# Patient Record
Sex: MALE | Race: WHITE | NOT HISPANIC OR LATINO | Employment: OTHER | URBAN - NONMETROPOLITAN AREA
[De-identification: names, ages, dates, MRNs, and addresses within clinical notes are randomized per-mention and may not be internally consistent; named-entity substitution may affect disease eponyms.]

---

## 2017-03-28 ENCOUNTER — OFFICE VISIT (OUTPATIENT)
Dept: GASTROENTEROLOGY | Facility: CLINIC | Age: 82
End: 2017-03-28

## 2017-03-28 VITALS
HEIGHT: 68 IN | HEART RATE: 128 BPM | SYSTOLIC BLOOD PRESSURE: 90 MMHG | BODY MASS INDEX: 24.55 KG/M2 | DIASTOLIC BLOOD PRESSURE: 60 MMHG | WEIGHT: 162 LBS

## 2017-03-28 DIAGNOSIS — R18.8 CIRRHOSIS OF LIVER WITH ASCITES, UNSPECIFIED HEPATIC CIRRHOSIS TYPE (HCC): Primary | ICD-10-CM

## 2017-03-28 DIAGNOSIS — K74.60 CIRRHOSIS OF LIVER WITH ASCITES, UNSPECIFIED HEPATIC CIRRHOSIS TYPE (HCC): Primary | ICD-10-CM

## 2017-03-28 PROCEDURE — 99203 OFFICE O/P NEW LOW 30 MIN: CPT | Performed by: INTERNAL MEDICINE

## 2017-03-28 RX ORDER — FLUTICASONE PROPIONATE 50 MCG
2 SPRAY, SUSPENSION (ML) NASAL DAILY
COMMUNITY

## 2017-03-28 RX ORDER — DILTIAZEM HYDROCHLORIDE 120 MG/1
120 CAPSULE, COATED, EXTENDED RELEASE ORAL
COMMUNITY
End: 2017-03-28

## 2017-03-28 RX ORDER — FUROSEMIDE 40 MG/1
40 TABLET ORAL EVERY MORNING
COMMUNITY

## 2017-03-28 RX ORDER — SPIRONOLACTONE 25 MG/1
25 TABLET ORAL DAILY
COMMUNITY
End: 2018-01-01 | Stop reason: SDUPTHER

## 2017-03-28 RX ORDER — DIAZEPAM 5 MG/1
5 TABLET ORAL 2 TIMES DAILY
COMMUNITY
Start: 2016-05-25 | End: 2018-01-01

## 2017-03-28 NOTE — PROGRESS NOTES
[facility]      Chief Complaint:   Chief Complaint   Patient presents with   • Nonalcoholic Steatohepatitis       Subjective     HPI:   Patient with questionable history of cirrhosis of the liver.  Patient unsure if any reason for having hepatitis or cirrhosis.  Patient denies a large alcohol history.  Patient has a history of having paracentesis for ascites and was placed on a low-dose diuretic and has had marked improvement in the ascites but is having problems with tachycardia and hypotension.  Patient states he does not have a primary doctor that he follows up with our cardiologist.    Plan; we'll do workup for patient's cirrhosis.  We'll try to determine patient's positive cirrhosis also do CT scan of the abdomen and pelvis.  If no causes found patient may need liver biopsy.  We'll also send patient to urgent care for evaluation of tachycardia and hypotension patient may need to be seen in the emergency room.  At this time patient feels well but I am worried about patient's markedly elevated heart rate.    Past Medical History:   Past Medical History:   Diagnosis Date   • GERD (gastroesophageal reflux disease)    • History of echocardiogram 05/04/2015    Echocardiogram W/ color flow 17916 (Low normal LV funciton with Ef of 50-55%. Normal RV size and funciton. Grade 1A diastolic dysfunction. No indication of left ventricular or left atrial thrombus. No sig. valvular regurg or stenosis.)   • Hyperlipidemia    • Hypertension        Family History:  History reviewed. No pertinent family history.    Social History:   reports that he has never smoked. His smokeless tobacco use includes Chew. He reports that he drinks alcohol. He reports that he does not use illicit drugs.    Medications:   Current Outpatient Prescriptions   Medication Sig Dispense Refill   • aspirin 325 MG tablet Take 325 mg by mouth Daily.     • atorvastatin (LIPITOR) 40 MG tablet Take 20 mg by mouth Daily With Dinner.     •  "diazePAM (VALIUM) 5 MG tablet Take 5 mg by mouth 2 (Two) Times a Day.     • fluticasone (FLONASE) 50 MCG/ACT nasal spray 2 sprays into each nostril Daily.     • furosemide (LASIX) 40 MG tablet Take 40 mg by mouth Daily. In A.M.     • HYDROcodone-acetaminophen (NORCO)  MG per tablet Take 1 tablet by mouth Every 4 (Four) Hours As Needed for moderate pain (4-6).     • lisinopril (PRINIVIL,ZESTRIL) 5 MG tablet Take 2.5 mg by mouth Daily.     • omeprazole (priLOSEC) 20 MG capsule Take 20 mg by mouth Every Morning Before Breakfast.     • risperiDONE (risperDAL) 3 MG tablet Take 3 mg by mouth Every Night.     • spironolactone (ALDACTONE) 25 MG tablet Take 25 mg by mouth Daily.       No current facility-administered medications for this visit.        Allergies:  Codeine; Iodine; and Penicillins    ROS:    Review of Systems   Constitutional: Negative for activity change, appetite change, chills, diaphoresis, fatigue, fever and unexpected weight change.   HENT: Negative for sore throat and trouble swallowing.    Respiratory: Negative for shortness of breath.    Gastrointestinal: Negative for abdominal distention, abdominal pain, anal bleeding, blood in stool, constipation, diarrhea, nausea, rectal pain and vomiting.   Musculoskeletal: Negative for arthralgias.   Skin: Negative for pallor.   Neurological: Negative for light-headedness.     Objective     Blood pressure 90/60, pulse (!) 128, height 68\" (172.7 cm), weight 162 lb (73.5 kg).    Physical Exam   Constitutional: He is oriented to person, place, and time. He appears well-developed and well-nourished. No distress.   HENT:   Head: Normocephalic and atraumatic.   Cardiovascular: Normal rate, regular rhythm, normal heart sounds and intact distal pulses.  Exam reveals no gallop and no friction rub.    No murmur heard.  Pulmonary/Chest: Breath sounds normal. No respiratory distress. He has no wheezes. He has no rales. He exhibits no tenderness.   Abdominal: Soft. Bowel " sounds are normal. He exhibits no distension and no mass. There is no tenderness. There is no rebound and no guarding. No hernia.   Musculoskeletal: Normal range of motion. He exhibits no edema.   Neurological: He is alert and oriented to person, place, and time.   Skin: Skin is warm and dry. No rash noted. He is not diaphoretic. No erythema. No pallor.   Psychiatric: He has a normal mood and affect. His behavior is normal. Judgment and thought content normal.        Assessment/Plan   Rodriguez was seen today for nonalcoholic steatohepatitis.    Diagnoses and all orders for this visit:    Cirrhosis of liver with ascites, unspecified hepatic cirrhosis type  -     Alpha - 1 - Antitrypsin  -     Anti-Smooth Muscle Antibody Titer  -     TORIBIO  -     Ceruloplasmin  -     Iron Profile  -     Hepatitis B & C Profile  -     Mitochondrial Antibodies, M2  -     SPICER Fibrosure  -     CT Abdomen Pelvis With & Without Contrast; Future        * Surgery not found *     Diagnosis Plan   1. Cirrhosis of liver with ascites, unspecified hepatic cirrhosis type  Alpha - 1 - Antitrypsin    Anti-Smooth Muscle Antibody Titer    TORIBIO    Ceruloplasmin    Iron Profile    Hepatitis B & C Profile    Mitochondrial Antibodies, M2    SPICER Fibrosure    CT Abdomen Pelvis With & Without Contrast       Anticipated Surgical Procedure:  No orders of the defined types were placed in this encounter.      The risks, benefits, and alternatives of this procedure have been discussed with the patient or the responsible party- the patient understands and agrees to proceed.

## 2017-04-19 DIAGNOSIS — K74.60 CIRRHOSIS OF LIVER WITH ASCITES, UNSPECIFIED HEPATIC CIRRHOSIS TYPE (HCC): Primary | ICD-10-CM

## 2017-04-19 DIAGNOSIS — R18.8 CIRRHOSIS OF LIVER WITH ASCITES, UNSPECIFIED HEPATIC CIRRHOSIS TYPE (HCC): Primary | ICD-10-CM

## 2017-04-20 ENCOUNTER — HOSPITAL ENCOUNTER (OUTPATIENT)
Dept: CT IMAGING | Facility: HOSPITAL | Age: 82
End: 2017-04-20
Attending: INTERNAL MEDICINE

## 2017-04-20 ENCOUNTER — HOSPITAL ENCOUNTER (OUTPATIENT)
Dept: CT IMAGING | Facility: HOSPITAL | Age: 82
Discharge: HOME | End: 2017-04-20
Admitting: NURSE PRACTITIONER

## 2017-04-20 PROCEDURE — 74176 CT ABD & PELVIS W/O CONTRAST: CPT

## 2017-04-21 RX ORDER — DILTIAZEM HYDROCHLORIDE 120 MG/1
120 CAPSULE, COATED, EXTENDED RELEASE ORAL DAILY
COMMUNITY

## 2017-04-27 ENCOUNTER — APPOINTMENT (OUTPATIENT)
Dept: CT IMAGING | Facility: HOSPITAL | Age: 82
End: 2017-04-27
Attending: INTERNAL MEDICINE

## 2017-05-04 ENCOUNTER — TELEPHONE (OUTPATIENT)
Dept: GASTROENTEROLOGY | Facility: CLINIC | Age: 82
End: 2017-05-04

## 2017-05-05 LAB
IRON 24H UR-MRATE: 160 MCG/DL (ref 49–181)
IRON SATN MFR SERPL: 71 % (ref 20–55)
TIBC SERPL-MCNC: 226 MCG/DL (ref 261–462)

## 2017-05-05 PROCEDURE — 83883 ASSAY NEPHELOMETRY NOT SPEC: CPT | Performed by: INTERNAL MEDICINE

## 2017-05-05 PROCEDURE — 84450 TRANSFERASE (AST) (SGOT): CPT | Performed by: INTERNAL MEDICINE

## 2017-05-05 PROCEDURE — 82947 ASSAY GLUCOSE BLOOD QUANT: CPT | Performed by: INTERNAL MEDICINE

## 2017-05-05 PROCEDURE — 87340 HEPATITIS B SURFACE AG IA: CPT | Performed by: INTERNAL MEDICINE

## 2017-05-05 PROCEDURE — 82103 ALPHA-1-ANTITRYPSIN TOTAL: CPT | Performed by: INTERNAL MEDICINE

## 2017-05-05 PROCEDURE — 82247 BILIRUBIN TOTAL: CPT | Performed by: INTERNAL MEDICINE

## 2017-05-05 PROCEDURE — 82977 ASSAY OF GGT: CPT | Performed by: INTERNAL MEDICINE

## 2017-05-05 PROCEDURE — 84478 ASSAY OF TRIGLYCERIDES: CPT | Performed by: INTERNAL MEDICINE

## 2017-05-05 PROCEDURE — 83550 IRON BINDING TEST: CPT | Performed by: INTERNAL MEDICINE

## 2017-05-05 PROCEDURE — 83516 IMMUNOASSAY NONANTIBODY: CPT | Performed by: INTERNAL MEDICINE

## 2017-05-05 PROCEDURE — 82465 ASSAY BLD/SERUM CHOLESTEROL: CPT | Performed by: INTERNAL MEDICINE

## 2017-05-05 PROCEDURE — 86704 HEP B CORE ANTIBODY TOTAL: CPT | Performed by: INTERNAL MEDICINE

## 2017-05-05 PROCEDURE — 83540 ASSAY OF IRON: CPT | Performed by: INTERNAL MEDICINE

## 2017-05-05 PROCEDURE — 86705 HEP B CORE ANTIBODY IGM: CPT | Performed by: INTERNAL MEDICINE

## 2017-05-05 PROCEDURE — 84460 ALANINE AMINO (ALT) (SGPT): CPT | Performed by: INTERNAL MEDICINE

## 2017-05-05 PROCEDURE — 86038 ANTINUCLEAR ANTIBODIES: CPT | Performed by: INTERNAL MEDICINE

## 2017-05-05 PROCEDURE — 83010 ASSAY OF HAPTOGLOBIN QUANT: CPT | Performed by: INTERNAL MEDICINE

## 2017-05-05 PROCEDURE — 86803 HEPATITIS C AB TEST: CPT | Performed by: INTERNAL MEDICINE

## 2017-05-05 PROCEDURE — 86706 HEP B SURFACE ANTIBODY: CPT | Performed by: INTERNAL MEDICINE

## 2017-05-05 PROCEDURE — 82390 ASSAY OF CERULOPLASMIN: CPT | Performed by: INTERNAL MEDICINE

## 2017-05-05 PROCEDURE — 87350 HEPATITIS BE AG IA: CPT | Performed by: INTERNAL MEDICINE

## 2017-05-05 PROCEDURE — 86707 HEPATITIS BE ANTIBODY: CPT | Performed by: INTERNAL MEDICINE

## 2017-05-07 LAB
A1AT SERPL-MCNC: 137 MG/DL (ref 90–200)
CERULOPLASMIN SERPL-MCNC: 24.3 MG/DL (ref 16–31)

## 2017-05-08 LAB
ACTIN IGG SERPL-ACNC: 39 UNITS (ref 0–19)
ANA SER QL: NEGATIVE
DEPRECATED MITOCHONDRIA M2 IGG SER-ACNC: <20 UNITS (ref 0–20)
HBV CORE AB SER DONR QL IA: NEGATIVE
HBV CORE IGM SERPL QL IA: NEGATIVE
HBV E AB SERPL QL IA: NEGATIVE
HBV E AG SERPL QL IA: NEGATIVE
HBV SURFACE AB SER QL: NON REACTIVE
HBV SURFACE AG SERPL QL IA: NEGATIVE
HCV AB S/CO SERPL IA: 0.1 S/CO RATIO (ref 0–0.9)
LABORATORY COMMENT REPORT: NORMAL

## 2017-05-09 ENCOUNTER — OFFICE VISIT (OUTPATIENT)
Dept: GASTROENTEROLOGY | Facility: CLINIC | Age: 82
End: 2017-05-09

## 2017-05-09 VITALS
HEART RATE: 100 BPM | BODY MASS INDEX: 24.55 KG/M2 | SYSTOLIC BLOOD PRESSURE: 98 MMHG | WEIGHT: 162 LBS | HEIGHT: 68 IN | DIASTOLIC BLOOD PRESSURE: 64 MMHG

## 2017-05-09 DIAGNOSIS — K74.60 HEPATIC CIRRHOSIS, UNSPECIFIED HEPATIC CIRRHOSIS TYPE (HCC): Primary | ICD-10-CM

## 2017-05-09 LAB
A2 MACROGLOB SERPL-MCNC: 310 MG/DL (ref 110–276)
ALT SERPL W P-5'-P-CCNC: 59 IU/L (ref 0–55)
APO A-I SERPL-MCNC: 162 MG/DL (ref 101–178)
AST SERPL W P-5'-P-CCNC: 63 IU/L (ref 0–40)
BILIRUB SERPL-MCNC: 0.8 MG/DL (ref 0–1.2)
CHOLEST SERPL-MCNC: 158 MG/DL (ref 100–199)
FIBROSIS SCORING:: ABNORMAL
FIBROSIS STAGE SERPL QL: ABNORMAL
GGT SERPL-CCNC: 386 IU/L (ref 0–65)
GLUCOSE SERPL-MCNC: 158 MG/DL (ref 65–99)
HAPTOGLOB SERPL-MCNC: 143 MG/DL (ref 34–200)
INTERPRETATIONS: (REFERENCE): ABNORMAL
LABORATORY COMMENT REPORT: ABNORMAL
LIMITATIONS: (REFERENCE): ABNORMAL
LIVER FIBR SCORE SERPL CALC.FIBROSURE: 0.85 (ref 0–0.21)
NASH GRADE (REFERENCE): ABNORMAL
NASH SCORE (REFERENCE): 0.25
NASH SCORING (REFERENCE): ABNORMAL
STEATOSIS GRADE (REFERENCE): ABNORMAL
STEATOSIS GRADING (REFERENCE): ABNORMAL
STEATOSIS SCORE (REFERENCE): 0.74 (ref 0–0.3)
TRIGL SERPL-MCNC: 114 MG/DL (ref 0–149)
WEIGHT: (REFERENCE): 162 LBS

## 2017-05-09 PROCEDURE — 99214 OFFICE O/P EST MOD 30 MIN: CPT | Performed by: INTERNAL MEDICINE

## 2017-05-23 ENCOUNTER — HOSPITAL ENCOUNTER (OUTPATIENT)
Dept: GENERAL RADIOLOGY | Facility: HOSPITAL | Age: 82
Discharge: HOME | End: 2017-05-23
Admitting: FAMILY MEDICINE

## 2017-05-23 DIAGNOSIS — R13.12 DYSPHAGIA, OROPHARYNGEAL: ICD-10-CM

## 2017-05-23 PROCEDURE — G8998 SWALLOW D/C STATUS: HCPCS | Performed by: SPEECH-LANGUAGE PATHOLOGIST

## 2017-05-23 PROCEDURE — G8996 SWALLOW CURRENT STATUS: HCPCS | Performed by: SPEECH-LANGUAGE PATHOLOGIST

## 2017-05-23 PROCEDURE — 92611 MOTION FLUOROSCOPY/SWALLOW: CPT | Performed by: SPEECH-LANGUAGE PATHOLOGIST

## 2017-05-23 PROCEDURE — 74230 X-RAY XM SWLNG FUNCJ C+: CPT

## 2017-05-23 PROCEDURE — G8997 SWALLOW GOAL STATUS: HCPCS | Performed by: SPEECH-LANGUAGE PATHOLOGIST

## 2017-05-23 RX ADMIN — BARIUM SULFATE 10 ML: 960 POWDER, FOR SUSPENSION ORAL at 10:10

## 2018-01-01 ENCOUNTER — HOSPITAL ENCOUNTER (OUTPATIENT)
Dept: ULTRASOUND IMAGING | Facility: HOSPITAL | Age: 83
Discharge: HOME | End: 2018-07-17
Admitting: INTERNAL MEDICINE

## 2018-01-01 ENCOUNTER — OFFICE VISIT (OUTPATIENT)
Dept: GASTROENTEROLOGY | Facility: CLINIC | Age: 83
End: 2018-01-01

## 2018-01-01 ENCOUNTER — HOSPITAL ENCOUNTER (EMERGENCY)
Facility: HOSPITAL | Age: 83
Discharge: HOME | End: 2018-10-06
Attending: EMERGENCY MEDICINE | Admitting: EMERGENCY MEDICINE

## 2018-01-01 ENCOUNTER — APPOINTMENT (OUTPATIENT)
Dept: CT IMAGING | Facility: HOSPITAL | Age: 83
End: 2018-01-01

## 2018-01-01 ENCOUNTER — HOSPITAL ENCOUNTER (EMERGENCY)
Facility: HOSPITAL | Age: 83
Discharge: NURSING, REHAB OR LONG-TERM CARE | End: 2018-09-07
Attending: EMERGENCY MEDICINE | Admitting: EMERGENCY MEDICINE

## 2018-01-01 ENCOUNTER — APPOINTMENT (OUTPATIENT)
Dept: ULTRASOUND IMAGING | Facility: HOSPITAL | Age: 83
End: 2018-01-01

## 2018-01-01 ENCOUNTER — LAB REQUISITION (OUTPATIENT)
Dept: LAB | Facility: HOSPITAL | Age: 83
End: 2018-01-01

## 2018-01-01 ENCOUNTER — DOCUMENTATION (OUTPATIENT)
Dept: GASTROENTEROLOGY | Facility: CLINIC | Age: 83
End: 2018-01-01

## 2018-01-01 ENCOUNTER — APPOINTMENT (OUTPATIENT)
Dept: GENERAL RADIOLOGY | Facility: HOSPITAL | Age: 83
End: 2018-01-01

## 2018-01-01 ENCOUNTER — HOSPITAL ENCOUNTER (OUTPATIENT)
Dept: ULTRASOUND IMAGING | Facility: HOSPITAL | Age: 83
End: 2018-01-01

## 2018-01-01 ENCOUNTER — HOSPITAL ENCOUNTER (OUTPATIENT)
Facility: HOSPITAL | Age: 83
Setting detail: OBSERVATION
LOS: 1 days | Discharge: NURSING, REHAB OR LONG-TERM CARE | End: 2018-07-13
Attending: EMERGENCY MEDICINE | Admitting: EMERGENCY MEDICINE

## 2018-01-01 ENCOUNTER — HOSPITAL ENCOUNTER (OUTPATIENT)
Dept: ULTRASOUND IMAGING | Facility: HOSPITAL | Age: 83
Discharge: HOME | End: 2018-08-23
Admitting: INTERNAL MEDICINE

## 2018-01-01 ENCOUNTER — DOCUMENTATION (OUTPATIENT)
Dept: CT IMAGING | Facility: HOSPITAL | Age: 83
End: 2018-01-01

## 2018-01-01 ENCOUNTER — TELEPHONE (OUTPATIENT)
Dept: GASTROENTEROLOGY | Facility: CLINIC | Age: 83
End: 2018-01-01

## 2018-01-01 ENCOUNTER — HOSPITAL ENCOUNTER (OUTPATIENT)
Facility: HOSPITAL | Age: 83
Setting detail: OBSERVATION
LOS: 1 days | Discharge: NURSING, REHAB OR LONG-TERM CARE | End: 2018-09-28
Attending: EMERGENCY MEDICINE | Admitting: INTERNAL MEDICINE

## 2018-01-01 ENCOUNTER — HOSPITAL ENCOUNTER (EMERGENCY)
Facility: HOSPITAL | Age: 83
Discharge: NURSING, REHAB OR LONG-TERM CARE | End: 2018-08-02
Attending: EMERGENCY MEDICINE | Admitting: EMERGENCY MEDICINE

## 2018-01-01 ENCOUNTER — RESULTS ENCOUNTER (OUTPATIENT)
Dept: GASTROENTEROLOGY | Facility: CLINIC | Age: 83
End: 2018-01-01

## 2018-01-01 ENCOUNTER — HOSPITAL ENCOUNTER (INPATIENT)
Facility: HOSPITAL | Age: 83
LOS: 4 days | Discharge: NURSING, REHAB OR LONG-TERM CARE | End: 2018-09-24
Attending: EMERGENCY MEDICINE | Admitting: INTERNAL MEDICINE

## 2018-01-01 VITALS
SYSTOLIC BLOOD PRESSURE: 100 MMHG | WEIGHT: 148.8 LBS | DIASTOLIC BLOOD PRESSURE: 64 MMHG | HEIGHT: 70 IN | HEART RATE: 87 BPM | BODY MASS INDEX: 21.3 KG/M2

## 2018-01-01 VITALS
RESPIRATION RATE: 16 BRPM | TEMPERATURE: 97.6 F | SYSTOLIC BLOOD PRESSURE: 121 MMHG | OXYGEN SATURATION: 96 % | WEIGHT: 145.6 LBS | DIASTOLIC BLOOD PRESSURE: 62 MMHG | HEIGHT: 70 IN | HEART RATE: 95 BPM | BODY MASS INDEX: 20.84 KG/M2

## 2018-01-01 VITALS
SYSTOLIC BLOOD PRESSURE: 123 MMHG | OXYGEN SATURATION: 96 % | HEART RATE: 98 BPM | BODY MASS INDEX: 23.33 KG/M2 | DIASTOLIC BLOOD PRESSURE: 89 MMHG | RESPIRATION RATE: 18 BRPM | WEIGHT: 153.9 LBS | HEIGHT: 68 IN | TEMPERATURE: 97.6 F

## 2018-01-01 VITALS
RESPIRATION RATE: 18 BRPM | DIASTOLIC BLOOD PRESSURE: 68 MMHG | WEIGHT: 154.1 LBS | BODY MASS INDEX: 22.82 KG/M2 | OXYGEN SATURATION: 94 % | HEIGHT: 69 IN | SYSTOLIC BLOOD PRESSURE: 104 MMHG | TEMPERATURE: 98.1 F | HEART RATE: 114 BPM

## 2018-01-01 VITALS
HEART RATE: 91 BPM | BODY MASS INDEX: 22.08 KG/M2 | WEIGHT: 149.1 LBS | HEIGHT: 69 IN | SYSTOLIC BLOOD PRESSURE: 110 MMHG | RESPIRATION RATE: 18 BRPM | TEMPERATURE: 96.7 F | OXYGEN SATURATION: 96 % | DIASTOLIC BLOOD PRESSURE: 66 MMHG

## 2018-01-01 VITALS
RESPIRATION RATE: 18 BRPM | BODY MASS INDEX: 26.52 KG/M2 | OXYGEN SATURATION: 98 % | WEIGHT: 175 LBS | HEIGHT: 68 IN | HEART RATE: 108 BPM | SYSTOLIC BLOOD PRESSURE: 103 MMHG | DIASTOLIC BLOOD PRESSURE: 67 MMHG

## 2018-01-01 VITALS
BODY MASS INDEX: 24.25 KG/M2 | WEIGHT: 160 LBS | SYSTOLIC BLOOD PRESSURE: 86 MMHG | HEIGHT: 68 IN | HEART RATE: 107 BPM | OXYGEN SATURATION: 97 % | DIASTOLIC BLOOD PRESSURE: 46 MMHG

## 2018-01-01 VITALS
WEIGHT: 154 LBS | HEART RATE: 107 BPM | BODY MASS INDEX: 23.34 KG/M2 | DIASTOLIC BLOOD PRESSURE: 60 MMHG | OXYGEN SATURATION: 97 % | HEIGHT: 68 IN | SYSTOLIC BLOOD PRESSURE: 102 MMHG

## 2018-01-01 VITALS
HEIGHT: 69 IN | RESPIRATION RATE: 20 BRPM | BODY MASS INDEX: 23.11 KG/M2 | HEART RATE: 116 BPM | WEIGHT: 156 LBS | SYSTOLIC BLOOD PRESSURE: 110 MMHG | OXYGEN SATURATION: 96 % | DIASTOLIC BLOOD PRESSURE: 63 MMHG | TEMPERATURE: 97.9 F

## 2018-01-01 VITALS
SYSTOLIC BLOOD PRESSURE: 116 MMHG | HEART RATE: 58 BPM | DIASTOLIC BLOOD PRESSURE: 70 MMHG | OXYGEN SATURATION: 93 % | HEIGHT: 68 IN

## 2018-01-01 DIAGNOSIS — R07.9 CHEST PAIN, UNSPECIFIED TYPE: ICD-10-CM

## 2018-01-01 DIAGNOSIS — N28.9 ACUTE RENAL INSUFFICIENCY: ICD-10-CM

## 2018-01-01 DIAGNOSIS — R18.8 OTHER ASCITES: Primary | ICD-10-CM

## 2018-01-01 DIAGNOSIS — R06.00 DYSPNEA, UNSPECIFIED TYPE: ICD-10-CM

## 2018-01-01 DIAGNOSIS — K74.69 OTHER CIRRHOSIS OF LIVER (HCC): ICD-10-CM

## 2018-01-01 DIAGNOSIS — R00.0 TACHYCARDIA: ICD-10-CM

## 2018-01-01 DIAGNOSIS — R18.8 OTHER ASCITES: ICD-10-CM

## 2018-01-01 DIAGNOSIS — K74.60 CIRRHOSIS OF LIVER WITH ASCITES, UNSPECIFIED HEPATIC CIRRHOSIS TYPE (HCC): ICD-10-CM

## 2018-01-01 DIAGNOSIS — R10.13 EPIGASTRIC PAIN: Primary | ICD-10-CM

## 2018-01-01 DIAGNOSIS — Z74.09 IMPAIRED MOBILITY AND ADLS: ICD-10-CM

## 2018-01-01 DIAGNOSIS — I48.91 ATRIAL FIBRILLATION, UNSPECIFIED TYPE (HCC): Primary | ICD-10-CM

## 2018-01-01 DIAGNOSIS — K74.60 CIRRHOSIS OF LIVER WITH ASCITES, UNSPECIFIED HEPATIC CIRRHOSIS TYPE (HCC): Primary | ICD-10-CM

## 2018-01-01 DIAGNOSIS — R18.8 CIRRHOSIS OF LIVER WITH ASCITES, UNSPECIFIED HEPATIC CIRRHOSIS TYPE (HCC): Primary | ICD-10-CM

## 2018-01-01 DIAGNOSIS — R18.8 CIRRHOSIS OF LIVER WITH ASCITES, UNSPECIFIED HEPATIC CIRRHOSIS TYPE (HCC): ICD-10-CM

## 2018-01-01 DIAGNOSIS — Z78.9 IMPAIRED MOBILITY AND ADLS: ICD-10-CM

## 2018-01-01 DIAGNOSIS — Z74.09 IMPAIRED FUNCTIONAL MOBILITY, BALANCE, GAIT, AND ENDURANCE: ICD-10-CM

## 2018-01-01 DIAGNOSIS — D64.9 ANEMIA, UNSPECIFIED TYPE: Primary | ICD-10-CM

## 2018-01-01 DIAGNOSIS — J18.9 PNEUMONIA OF LEFT LOWER LOBE DUE TO INFECTIOUS ORGANISM: ICD-10-CM

## 2018-01-01 DIAGNOSIS — R06.02 SHORTNESS OF BREATH: ICD-10-CM

## 2018-01-01 LAB
ABO + RH BLD: NORMAL
ABO + RH BLD: NORMAL
ABO GROUP BLD: NORMAL
ABO GROUP BLD: NORMAL
AFP-TM SERPL-MCNC: 0.9 NG/ML (ref 0–8.3)
ALBUMIN FLD-MCNC: <1 G/DL
ALBUMIN SERPL-MCNC: 2.2 G/DL (ref 3.4–4.8)
ALBUMIN SERPL-MCNC: 2.3 G/DL (ref 3.4–4.8)
ALBUMIN SERPL-MCNC: 2.4 G/DL (ref 3.4–4.8)
ALBUMIN SERPL-MCNC: 2.4 G/DL (ref 3.4–4.8)
ALBUMIN SERPL-MCNC: 2.5 G/DL (ref 3.4–4.8)
ALBUMIN SERPL-MCNC: 2.6 G/DL (ref 3.4–4.8)
ALBUMIN SERPL-MCNC: 2.6 G/DL (ref 3.4–4.8)
ALBUMIN SERPL-MCNC: 2.7 G/DL (ref 3.4–4.8)
ALBUMIN SERPL-MCNC: 2.9 G/DL (ref 3.4–4.8)
ALBUMIN SERPL-MCNC: 3 G/DL (ref 3.4–4.8)
ALBUMIN SERPL-MCNC: 3 G/DL (ref 3.4–4.8)
ALBUMIN/GLOB SERPL: 0.7 G/DL (ref 1.1–1.8)
ALBUMIN/GLOB SERPL: 0.8 G/DL (ref 1.1–1.8)
ALP SERPL-CCNC: 215 U/L (ref 38–126)
ALP SERPL-CCNC: 225 U/L (ref 38–126)
ALP SERPL-CCNC: 226 U/L (ref 38–126)
ALP SERPL-CCNC: 245 U/L (ref 38–126)
ALP SERPL-CCNC: 248 U/L (ref 38–126)
ALP SERPL-CCNC: 267 U/L (ref 38–126)
ALP SERPL-CCNC: 287 U/L (ref 38–126)
ALP SERPL-CCNC: 290 U/L (ref 38–126)
ALP SERPL-CCNC: 292 U/L (ref 38–126)
ALP SERPL-CCNC: 321 U/L (ref 38–126)
ALP SERPL-CCNC: 325 U/L (ref 38–126)
ALP SERPL-CCNC: 365 U/L (ref 38–126)
ALP SERPL-CCNC: 384 U/L (ref 38–126)
ALT SERPL W P-5'-P-CCNC: 123 U/L (ref 21–72)
ALT SERPL W P-5'-P-CCNC: 143 U/L (ref 21–72)
ALT SERPL W P-5'-P-CCNC: 204 U/L (ref 21–72)
ALT SERPL W P-5'-P-CCNC: 29 U/L (ref 21–72)
ALT SERPL W P-5'-P-CCNC: 33 U/L (ref 21–72)
ALT SERPL W P-5'-P-CCNC: 36 U/L (ref 21–72)
ALT SERPL W P-5'-P-CCNC: 49 U/L (ref 21–72)
ALT SERPL W P-5'-P-CCNC: 55 U/L (ref 21–72)
ALT SERPL W P-5'-P-CCNC: 61 U/L (ref 21–72)
ALT SERPL W P-5'-P-CCNC: 63 U/L (ref 21–72)
ALT SERPL W P-5'-P-CCNC: 65 U/L (ref 21–72)
ALT SERPL W P-5'-P-CCNC: 80 U/L (ref 21–72)
ALT SERPL W P-5'-P-CCNC: 91 U/L (ref 21–72)
AMMONIA BLD-SCNC: 10 UMOL/L (ref 9–30)
AMMONIA BLD-SCNC: 11 UMOL/L (ref 9–30)
AMMONIA BLD-SCNC: 22 UMOL/L (ref 9–30)
AMYLASE FLD-CCNC: <30 U/L
AMYLASE SERPL-CCNC: 69 U/L (ref 50–130)
ANION GAP SERPL CALCULATED.3IONS-SCNC: 10 MMOL/L (ref 5–15)
ANION GAP SERPL CALCULATED.3IONS-SCNC: 10 MMOL/L (ref 5–15)
ANION GAP SERPL CALCULATED.3IONS-SCNC: 11 MMOL/L (ref 5–15)
ANION GAP SERPL CALCULATED.3IONS-SCNC: 12 MMOL/L (ref 5–15)
ANION GAP SERPL CALCULATED.3IONS-SCNC: 12 MMOL/L (ref 5–15)
ANION GAP SERPL CALCULATED.3IONS-SCNC: 14 MMOL/L (ref 5–15)
ANION GAP SERPL CALCULATED.3IONS-SCNC: 6 MMOL/L (ref 5–15)
ANION GAP SERPL CALCULATED.3IONS-SCNC: 7 MMOL/L (ref 5–15)
ANION GAP SERPL CALCULATED.3IONS-SCNC: 7 MMOL/L (ref 5–15)
ANION GAP SERPL CALCULATED.3IONS-SCNC: 8 MMOL/L (ref 5–15)
ANION GAP SERPL CALCULATED.3IONS-SCNC: 9 MMOL/L (ref 5–15)
APPEARANCE FLD: ABNORMAL
APTT PPP: 29.3 SECONDS (ref 20–40.3)
APTT PPP: 30.7 SECONDS (ref 20–40.3)
APTT PPP: 30.9 SECONDS (ref 20–40.3)
APTT PPP: 33.9 SECONDS (ref 20–40.3)
APTT PPP: 34.4 SECONDS (ref 20–40.3)
AST SERPL-CCNC: 135 U/L (ref 17–59)
AST SERPL-CCNC: 255 U/L (ref 17–59)
AST SERPL-CCNC: 38 U/L (ref 17–59)
AST SERPL-CCNC: 41 U/L (ref 17–59)
AST SERPL-CCNC: 42 U/L (ref 17–59)
AST SERPL-CCNC: 44 U/L (ref 17–59)
AST SERPL-CCNC: 44 U/L (ref 17–59)
AST SERPL-CCNC: 45 U/L (ref 17–59)
AST SERPL-CCNC: 45 U/L (ref 17–59)
AST SERPL-CCNC: 46 U/L (ref 17–59)
AST SERPL-CCNC: 53 U/L (ref 17–59)
AST SERPL-CCNC: 64 U/L (ref 17–59)
AST SERPL-CCNC: 78 U/L (ref 17–59)
BACTERIA FLD CULT: NORMAL
BACTERIA FLD CULT: NORMAL
BACTERIA SPEC AEROBE CULT: NORMAL
BASOPHILS # BLD AUTO: 0.01 10*3/MM3 (ref 0–0.2)
BASOPHILS # BLD AUTO: 0.02 10*3/MM3 (ref 0–0.2)
BASOPHILS # BLD AUTO: 0.03 10*3/MM3 (ref 0–0.2)
BASOPHILS # BLD AUTO: 0.04 10*3/MM3 (ref 0–0.2)
BASOPHILS # BLD AUTO: 0.07 10*3/MM3 (ref 0–0.2)
BASOPHILS NFR BLD AUTO: 0.1 % (ref 0–2)
BASOPHILS NFR BLD AUTO: 0.2 % (ref 0–2)
BASOPHILS NFR BLD AUTO: 0.3 % (ref 0–2)
BASOPHILS NFR BLD AUTO: 0.3 % (ref 0–2)
BASOPHILS NFR BLD AUTO: 0.5 % (ref 0–2)
BASOPHILS NFR BLD AUTO: 0.5 % (ref 0–2)
BASOPHILS NFR BLD AUTO: 0.8 % (ref 0–2)
BASOPHILS NFR BLD AUTO: 1.2 % (ref 0–2)
BH BB BLOOD EXPIRATION DATE: NORMAL
BH BB BLOOD EXPIRATION DATE: NORMAL
BH BB BLOOD TYPE BARCODE: 6200
BH BB BLOOD TYPE BARCODE: 6200
BH BB DISPENSE STATUS: NORMAL
BH BB DISPENSE STATUS: NORMAL
BH BB PRODUCT CODE: NORMAL
BH BB PRODUCT CODE: NORMAL
BH BB UNIT NUMBER: NORMAL
BH BB UNIT NUMBER: NORMAL
BILIRUB SERPL-MCNC: 1 MG/DL (ref 0.2–1.3)
BILIRUB SERPL-MCNC: 1.1 MG/DL (ref 0.2–1.3)
BILIRUB SERPL-MCNC: 1.2 MG/DL (ref 0.2–1.3)
BILIRUB SERPL-MCNC: 1.4 MG/DL (ref 0.2–1.3)
BILIRUB SERPL-MCNC: 1.5 MG/DL (ref 0.2–1.3)
BILIRUB SERPL-MCNC: 1.8 MG/DL (ref 0.2–1.3)
BILIRUB SERPL-MCNC: 1.9 MG/DL (ref 0.2–1.3)
BILIRUB SERPL-MCNC: 2 MG/DL (ref 0.2–1.3)
BILIRUB SERPL-MCNC: 2.1 MG/DL (ref 0.2–1.3)
BILIRUB SERPL-MCNC: 2.2 MG/DL (ref 0.2–1.3)
BILIRUB SERPL-MCNC: 2.7 MG/DL (ref 0.2–1.3)
BILIRUB UR QL STRIP: ABNORMAL
BILIRUB UR QL STRIP: NEGATIVE
BLD GP AB SCN SERPL QL: NEGATIVE
BLD GP AB SCN SERPL QL: NEGATIVE
BUN BLD-MCNC: 15 MG/DL (ref 7–21)
BUN BLD-MCNC: 15 MG/DL (ref 7–21)
BUN BLD-MCNC: 19 MG/DL (ref 7–21)
BUN BLD-MCNC: 20 MG/DL (ref 7–21)
BUN BLD-MCNC: 21 MG/DL (ref 7–21)
BUN BLD-MCNC: 22 MG/DL (ref 7–21)
BUN BLD-MCNC: 31 MG/DL (ref 7–21)
BUN BLD-MCNC: 31 MG/DL (ref 7–21)
BUN BLD-MCNC: 33 MG/DL (ref 7–21)
BUN BLD-MCNC: 34 MG/DL (ref 7–21)
BUN BLD-MCNC: 37 MG/DL (ref 7–21)
BUN BLD-MCNC: 37 MG/DL (ref 7–21)
BUN BLD-MCNC: 39 MG/DL (ref 7–21)
BUN BLD-MCNC: 40 MG/DL (ref 7–21)
BUN/CREAT SERPL: 15.3 (ref 7–25)
BUN/CREAT SERPL: 16.1 (ref 7–25)
BUN/CREAT SERPL: 18.6 (ref 7–25)
BUN/CREAT SERPL: 18.9 (ref 7–25)
BUN/CREAT SERPL: 20.9 (ref 7–25)
BUN/CREAT SERPL: 21.9 (ref 7–25)
BUN/CREAT SERPL: 22.2 (ref 7–25)
BUN/CREAT SERPL: 24.4 (ref 7–25)
BUN/CREAT SERPL: 24.5 (ref 7–25)
BUN/CREAT SERPL: 25.2 (ref 7–25)
BUN/CREAT SERPL: 25.4 (ref 7–25)
BUN/CREAT SERPL: 26.2 (ref 7–25)
BUN/CREAT SERPL: 27.7 (ref 7–25)
BUN/CREAT SERPL: 28.6 (ref 7–25)
BUN/CREAT SERPL: 28.8 (ref 7–25)
BUN/CREAT SERPL: 32.1 (ref 7–25)
CALCIUM SPEC-SCNC: 7.6 MG/DL (ref 8.4–10.2)
CALCIUM SPEC-SCNC: 7.8 MG/DL (ref 8.4–10.2)
CALCIUM SPEC-SCNC: 7.9 MG/DL (ref 8.4–10.2)
CALCIUM SPEC-SCNC: 7.9 MG/DL (ref 8.4–10.2)
CALCIUM SPEC-SCNC: 8 MG/DL (ref 8.4–10.2)
CALCIUM SPEC-SCNC: 8 MG/DL (ref 8.4–10.2)
CALCIUM SPEC-SCNC: 8.1 MG/DL (ref 8.4–10.2)
CALCIUM SPEC-SCNC: 8.2 MG/DL (ref 8.4–10.2)
CALCIUM SPEC-SCNC: 8.3 MG/DL (ref 8.4–10.2)
CALCIUM SPEC-SCNC: 8.3 MG/DL (ref 8.4–10.2)
CALCIUM SPEC-SCNC: 8.4 MG/DL (ref 8.4–10.2)
CHLORIDE SERPL-SCNC: 100 MMOL/L (ref 95–110)
CHLORIDE SERPL-SCNC: 101 MMOL/L (ref 95–110)
CHLORIDE SERPL-SCNC: 102 MMOL/L (ref 95–110)
CHLORIDE SERPL-SCNC: 102 MMOL/L (ref 95–110)
CHLORIDE SERPL-SCNC: 103 MMOL/L (ref 95–110)
CHLORIDE SERPL-SCNC: 104 MMOL/L (ref 95–110)
CHLORIDE SERPL-SCNC: 96 MMOL/L (ref 95–110)
CHLORIDE SERPL-SCNC: 96 MMOL/L (ref 95–110)
CHLORIDE SERPL-SCNC: 97 MMOL/L (ref 95–110)
CHLORIDE SERPL-SCNC: 97 MMOL/L (ref 95–110)
CHLORIDE SERPL-SCNC: 98 MMOL/L (ref 95–110)
CHLORIDE SERPL-SCNC: 98 MMOL/L (ref 95–110)
CHLORIDE SERPL-SCNC: 99 MMOL/L (ref 95–110)
CHLORIDE SERPL-SCNC: 99 MMOL/L (ref 95–110)
CK MB SERPL-CCNC: 0.82 NG/ML (ref 0–5)
CK SERPL-CCNC: 37 U/L (ref 55–170)
CLARITY UR: CLEAR
CLARITY UR: CLEAR
CO2 SERPL-SCNC: 20 MMOL/L (ref 22–31)
CO2 SERPL-SCNC: 20 MMOL/L (ref 22–31)
CO2 SERPL-SCNC: 21 MMOL/L (ref 22–31)
CO2 SERPL-SCNC: 22 MMOL/L (ref 22–31)
CO2 SERPL-SCNC: 22 MMOL/L (ref 22–31)
CO2 SERPL-SCNC: 23 MMOL/L (ref 22–31)
CO2 SERPL-SCNC: 24 MMOL/L (ref 22–31)
CO2 SERPL-SCNC: 24 MMOL/L (ref 22–31)
CO2 SERPL-SCNC: 27 MMOL/L (ref 22–31)
CO2 SERPL-SCNC: 27 MMOL/L (ref 22–31)
CO2 SERPL-SCNC: 28 MMOL/L (ref 22–31)
CO2 SERPL-SCNC: 30 MMOL/L (ref 22–31)
COLOR FLD: ABNORMAL
COLOR FLD: ABNORMAL
COLOR FLD: YELLOW
COLOR UR: ABNORMAL
COLOR UR: YELLOW
CRE SCREEN PCR: NOT DETECTED
CREAT BLD-MCNC: 0.77 MG/DL (ref 0.7–1.3)
CREAT BLD-MCNC: 0.91 MG/DL (ref 0.7–1.3)
CREAT BLD-MCNC: 0.93 MG/DL (ref 0.7–1.3)
CREAT BLD-MCNC: 0.96 MG/DL (ref 0.7–1.3)
CREAT BLD-MCNC: 0.98 MG/DL (ref 0.7–1.3)
CREAT BLD-MCNC: 1.06 MG/DL (ref 0.7–1.3)
CREAT BLD-MCNC: 1.06 MG/DL (ref 0.7–1.3)
CREAT BLD-MCNC: 1.12 MG/DL (ref 0.7–1.3)
CREAT BLD-MCNC: 1.26 MG/DL (ref 0.7–1.3)
CREAT BLD-MCNC: 1.3 MG/DL (ref 0.7–1.3)
CREAT BLD-MCNC: 1.31 MG/DL (ref 0.7–1.3)
CREAT BLD-MCNC: 1.39 MG/DL (ref 0.7–1.3)
CREAT BLD-MCNC: 1.51 MG/DL (ref 0.7–1.3)
CREAT BLD-MCNC: 1.6 MG/DL (ref 0.7–1.3)
CREAT BLD-MCNC: 1.67 MG/DL (ref 0.7–1.3)
CREAT BLD-MCNC: 1.67 MG/DL (ref 0.7–1.3)
D-DIMER, QUANTITATIVE (MAD,POW, STR): >4000 NG/ML (FEU) (ref 0–470)
D-LACTATE SERPL-SCNC: 1.6 MMOL/L (ref 0.5–2)
D-LACTATE SERPL-SCNC: 1.8 MMOL/L (ref 0.5–2)
D-LACTATE SERPL-SCNC: 2 MMOL/L (ref 0.5–2)
D-LACTATE SERPL-SCNC: 2.5 MMOL/L (ref 0.5–2)
D-LACTATE SERPL-SCNC: 2.5 MMOL/L (ref 0.5–2)
DEPRECATED RDW RBC AUTO: 49.7 FL (ref 35.1–43.9)
DEPRECATED RDW RBC AUTO: 50.1 FL (ref 35.1–43.9)
DEPRECATED RDW RBC AUTO: 51.7 FL (ref 35.1–43.9)
DEPRECATED RDW RBC AUTO: 51.8 FL (ref 35.1–43.9)
DEPRECATED RDW RBC AUTO: 52.2 FL (ref 35.1–43.9)
DEPRECATED RDW RBC AUTO: 53.4 FL (ref 35.1–43.9)
DEPRECATED RDW RBC AUTO: 53.5 FL (ref 35.1–43.9)
DEPRECATED RDW RBC AUTO: 53.7 FL (ref 35.1–43.9)
DEPRECATED RDW RBC AUTO: 54.2 FL (ref 35.1–43.9)
DEPRECATED RDW RBC AUTO: 54.3 FL (ref 35.1–43.9)
DEPRECATED RDW RBC AUTO: 55.9 FL (ref 35.1–43.9)
DEPRECATED RDW RBC AUTO: 56.9 FL (ref 35.1–43.9)
DEPRECATED RDW RBC AUTO: 57 FL (ref 35.1–43.9)
DEPRECATED RDW RBC AUTO: 58 FL (ref 35.1–43.9)
DEPRECATED RDW RBC AUTO: 58.1 FL (ref 35.1–43.9)
DEPRECATED RDW RBC AUTO: 60.9 FL (ref 35.1–43.9)
DIGOXIN SERPL-MCNC: 0.5 NG/ML (ref 0.8–2)
DIGOXIN SERPL-MCNC: 0.8 NG/ML (ref 0.8–2)
DIGOXIN SERPL-MCNC: 0.9 NG/ML (ref 0.8–2)
DIGOXIN SERPL-MCNC: 1.2 NG/ML (ref 0.8–2)
EOSINOPHIL # BLD AUTO: 0 10*3/MM3 (ref 0–0.7)
EOSINOPHIL # BLD AUTO: 0.02 10*3/MM3 (ref 0–0.7)
EOSINOPHIL # BLD AUTO: 0.02 10*3/MM3 (ref 0–0.7)
EOSINOPHIL # BLD AUTO: 0.03 10*3/MM3 (ref 0–0.7)
EOSINOPHIL # BLD AUTO: 0.05 10*3/MM3 (ref 0–0.7)
EOSINOPHIL # BLD AUTO: 0.07 10*3/MM3 (ref 0–0.7)
EOSINOPHIL # BLD AUTO: 0.08 10*3/MM3 (ref 0–0.7)
EOSINOPHIL # BLD AUTO: 0.08 10*3/MM3 (ref 0–0.7)
EOSINOPHIL # BLD AUTO: 0.1 10*3/MM3 (ref 0–0.7)
EOSINOPHIL # BLD AUTO: 0.14 10*3/MM3 (ref 0–0.7)
EOSINOPHIL # BLD AUTO: 0.14 10*3/MM3 (ref 0–0.7)
EOSINOPHIL # BLD AUTO: 0.18 10*3/MM3 (ref 0–0.7)
EOSINOPHIL # BLD AUTO: 0.18 10*3/MM3 (ref 0–0.7)
EOSINOPHIL # BLD AUTO: 0.19 10*3/MM3 (ref 0–0.7)
EOSINOPHIL # BLD AUTO: 0.23 10*3/MM3 (ref 0–0.7)
EOSINOPHIL # BLD AUTO: 0.26 10*3/MM3 (ref 0–0.7)
EOSINOPHIL NFR BLD AUTO: 0 % (ref 0–7)
EOSINOPHIL NFR BLD AUTO: 0.1 % (ref 0–7)
EOSINOPHIL NFR BLD AUTO: 0.1 % (ref 0–7)
EOSINOPHIL NFR BLD AUTO: 0.2 % (ref 0–7)
EOSINOPHIL NFR BLD AUTO: 0.3 % (ref 0–7)
EOSINOPHIL NFR BLD AUTO: 0.6 % (ref 0–7)
EOSINOPHIL NFR BLD AUTO: 0.8 % (ref 0–7)
EOSINOPHIL NFR BLD AUTO: 1.1 % (ref 0–7)
EOSINOPHIL NFR BLD AUTO: 1.4 % (ref 0–7)
EOSINOPHIL NFR BLD AUTO: 1.8 % (ref 0–7)
EOSINOPHIL NFR BLD AUTO: 2.4 % (ref 0–7)
EOSINOPHIL NFR BLD AUTO: 2.7 % (ref 0–7)
EOSINOPHIL NFR BLD AUTO: 3.4 % (ref 0–7)
EOSINOPHIL NFR BLD AUTO: 4.3 % (ref 0–7)
ERYTHROCYTE [DISTWIDTH] IN BLOOD BY AUTOMATED COUNT: 14.4 % (ref 11.5–14.5)
ERYTHROCYTE [DISTWIDTH] IN BLOOD BY AUTOMATED COUNT: 14.7 % (ref 11.5–14.5)
ERYTHROCYTE [DISTWIDTH] IN BLOOD BY AUTOMATED COUNT: 14.7 % (ref 11.5–14.5)
ERYTHROCYTE [DISTWIDTH] IN BLOOD BY AUTOMATED COUNT: 14.9 % (ref 11.5–14.5)
ERYTHROCYTE [DISTWIDTH] IN BLOOD BY AUTOMATED COUNT: 14.9 % (ref 11.5–14.5)
ERYTHROCYTE [DISTWIDTH] IN BLOOD BY AUTOMATED COUNT: 15.1 % (ref 11.5–14.5)
ERYTHROCYTE [DISTWIDTH] IN BLOOD BY AUTOMATED COUNT: 15.5 % (ref 11.5–14.5)
ERYTHROCYTE [DISTWIDTH] IN BLOOD BY AUTOMATED COUNT: 15.5 % (ref 11.5–14.5)
ERYTHROCYTE [DISTWIDTH] IN BLOOD BY AUTOMATED COUNT: 16 % (ref 11.5–14.5)
ERYTHROCYTE [DISTWIDTH] IN BLOOD BY AUTOMATED COUNT: 16.3 % (ref 11.5–14.5)
ERYTHROCYTE [DISTWIDTH] IN BLOOD BY AUTOMATED COUNT: 16.5 % (ref 11.5–14.5)
ERYTHROCYTE [DISTWIDTH] IN BLOOD BY AUTOMATED COUNT: 16.6 % (ref 11.5–14.5)
ERYTHROCYTE [DISTWIDTH] IN BLOOD BY AUTOMATED COUNT: 16.6 % (ref 11.5–14.5)
ERYTHROCYTE [DISTWIDTH] IN BLOOD BY AUTOMATED COUNT: 16.7 % (ref 11.5–14.5)
ERYTHROCYTE [DISTWIDTH] IN BLOOD BY AUTOMATED COUNT: 16.7 % (ref 11.5–14.5)
ERYTHROCYTE [DISTWIDTH] IN BLOOD BY AUTOMATED COUNT: 17.2 % (ref 11.5–14.5)
GFR SERPL CREATININE-BSD FRML MDRD: 39 ML/MIN/1.73 (ref 42–98)
GFR SERPL CREATININE-BSD FRML MDRD: 39 ML/MIN/1.73 (ref 42–98)
GFR SERPL CREATININE-BSD FRML MDRD: 41 ML/MIN/1.73 (ref 42–98)
GFR SERPL CREATININE-BSD FRML MDRD: 44 ML/MIN/1.73 (ref 42–98)
GFR SERPL CREATININE-BSD FRML MDRD: 49 ML/MIN/1.73 (ref 42–98)
GFR SERPL CREATININE-BSD FRML MDRD: 52 ML/MIN/1.73 (ref 42–98)
GFR SERPL CREATININE-BSD FRML MDRD: 53 ML/MIN/1.73 (ref 42–98)
GFR SERPL CREATININE-BSD FRML MDRD: 55 ML/MIN/1.73 (ref 42–98)
GFR SERPL CREATININE-BSD FRML MDRD: 62 ML/MIN/1.73 (ref 42–98)
GFR SERPL CREATININE-BSD FRML MDRD: 67 ML/MIN/1.73 (ref 42–98)
GFR SERPL CREATININE-BSD FRML MDRD: 67 ML/MIN/1.73 (ref 42–98)
GFR SERPL CREATININE-BSD FRML MDRD: 73 ML/MIN/1.73 (ref 42–98)
GFR SERPL CREATININE-BSD FRML MDRD: 75 ML/MIN/1.73 (ref 42–98)
GFR SERPL CREATININE-BSD FRML MDRD: 77 ML/MIN/1.73 (ref 42–98)
GFR SERPL CREATININE-BSD FRML MDRD: 79 ML/MIN/1.73 (ref 42–98)
GFR SERPL CREATININE-BSD FRML MDRD: 96 ML/MIN/1.73 (ref 42–98)
GLOBULIN UR ELPH-MCNC: 3.2 GM/DL (ref 2.3–3.5)
GLOBULIN UR ELPH-MCNC: 3.3 GM/DL (ref 2.3–3.5)
GLOBULIN UR ELPH-MCNC: 3.3 GM/DL (ref 2.3–3.5)
GLOBULIN UR ELPH-MCNC: 3.6 GM/DL (ref 2.3–3.5)
GLOBULIN UR ELPH-MCNC: 3.8 GM/DL (ref 2.3–3.5)
GLOBULIN UR ELPH-MCNC: 3.9 GM/DL (ref 2.3–3.5)
GLOBULIN UR ELPH-MCNC: 3.9 GM/DL (ref 2.3–3.5)
GLOBULIN UR ELPH-MCNC: 4 GM/DL (ref 2.3–3.5)
GLOBULIN UR ELPH-MCNC: 4.1 GM/DL (ref 2.3–3.5)
GLOBULIN UR ELPH-MCNC: 4.1 GM/DL (ref 2.3–3.5)
GLOBULIN UR ELPH-MCNC: 4.2 GM/DL (ref 2.3–3.5)
GLUCOSE BLD-MCNC: 120 MG/DL (ref 60–100)
GLUCOSE BLD-MCNC: 130 MG/DL (ref 60–100)
GLUCOSE BLD-MCNC: 137 MG/DL (ref 60–100)
GLUCOSE BLD-MCNC: 139 MG/DL (ref 60–100)
GLUCOSE BLD-MCNC: 151 MG/DL (ref 60–100)
GLUCOSE BLD-MCNC: 154 MG/DL (ref 60–100)
GLUCOSE BLD-MCNC: 159 MG/DL (ref 60–100)
GLUCOSE BLD-MCNC: 162 MG/DL (ref 60–100)
GLUCOSE BLD-MCNC: 163 MG/DL (ref 60–100)
GLUCOSE BLD-MCNC: 172 MG/DL (ref 60–100)
GLUCOSE BLD-MCNC: 174 MG/DL (ref 60–100)
GLUCOSE BLD-MCNC: 179 MG/DL (ref 60–100)
GLUCOSE BLD-MCNC: 181 MG/DL (ref 60–100)
GLUCOSE BLD-MCNC: 189 MG/DL (ref 60–100)
GLUCOSE BLD-MCNC: 209 MG/DL (ref 60–100)
GLUCOSE BLD-MCNC: 259 MG/DL (ref 60–100)
GLUCOSE BLDC GLUCOMTR-MCNC: 126 MG/DL (ref 70–130)
GLUCOSE BLDC GLUCOMTR-MCNC: 147 MG/DL (ref 70–130)
GLUCOSE BLDC GLUCOMTR-MCNC: 165 MG/DL (ref 70–130)
GLUCOSE BLDC GLUCOMTR-MCNC: 169 MG/DL (ref 70–130)
GLUCOSE BLDC GLUCOMTR-MCNC: 174 MG/DL (ref 70–130)
GLUCOSE BLDC GLUCOMTR-MCNC: 176 MG/DL (ref 70–130)
GLUCOSE BLDC GLUCOMTR-MCNC: 177 MG/DL (ref 70–130)
GLUCOSE BLDC GLUCOMTR-MCNC: 238 MG/DL (ref 70–130)
GLUCOSE UR STRIP-MCNC: NEGATIVE MG/DL
GLUCOSE UR STRIP-MCNC: NEGATIVE MG/DL
GRAM STN SPEC: NORMAL
HCT VFR BLD AUTO: 21.2 % (ref 39–49)
HCT VFR BLD AUTO: 25.9 % (ref 39–49)
HCT VFR BLD AUTO: 29.2 % (ref 39–49)
HCT VFR BLD AUTO: 29.5 % (ref 39–49)
HCT VFR BLD AUTO: 29.7 % (ref 39–49)
HCT VFR BLD AUTO: 29.7 % (ref 39–49)
HCT VFR BLD AUTO: 30.2 % (ref 39–49)
HCT VFR BLD AUTO: 30.6 % (ref 39–49)
HCT VFR BLD AUTO: 31 % (ref 39–49)
HCT VFR BLD AUTO: 31.4 % (ref 39–49)
HCT VFR BLD AUTO: 31.7 % (ref 39–49)
HCT VFR BLD AUTO: 32 % (ref 39–49)
HCT VFR BLD AUTO: 32.7 % (ref 39–49)
HCT VFR BLD AUTO: 32.9 % (ref 39–49)
HCT VFR BLD AUTO: 33.4 % (ref 39–49)
HCT VFR BLD AUTO: 35 % (ref 39–49)
HCT VFR BLD AUTO: 35.3 % (ref 39–49)
HCT VFR BLD AUTO: 36.2 % (ref 39–49)
HGB BLD-MCNC: 10.1 G/DL (ref 13.7–17.3)
HGB BLD-MCNC: 10.5 G/DL (ref 13.7–17.3)
HGB BLD-MCNC: 10.5 G/DL (ref 13.7–17.3)
HGB BLD-MCNC: 10.6 G/DL (ref 13.7–17.3)
HGB BLD-MCNC: 10.6 G/DL (ref 13.7–17.3)
HGB BLD-MCNC: 10.9 G/DL (ref 13.7–17.3)
HGB BLD-MCNC: 11 G/DL (ref 13.7–17.3)
HGB BLD-MCNC: 11.1 G/DL (ref 13.7–17.3)
HGB BLD-MCNC: 11.1 G/DL (ref 13.7–17.3)
HGB BLD-MCNC: 11.2 G/DL (ref 13.7–17.3)
HGB BLD-MCNC: 11.4 G/DL (ref 13.7–17.3)
HGB BLD-MCNC: 11.4 G/DL (ref 13.7–17.3)
HGB BLD-MCNC: 11.5 G/DL (ref 13.7–17.3)
HGB BLD-MCNC: 12.1 G/DL (ref 13.7–17.3)
HGB BLD-MCNC: 12.4 G/DL (ref 13.7–17.3)
HGB BLD-MCNC: 12.6 G/DL (ref 13.7–17.3)
HGB BLD-MCNC: 7.4 G/DL (ref 13.7–17.3)
HGB BLD-MCNC: 9.2 G/DL (ref 13.7–17.3)
HGB UR QL STRIP.AUTO: NEGATIVE
HGB UR QL STRIP.AUTO: NEGATIVE
HOLD SPECIMEN: NORMAL
IMM GRANULOCYTES # BLD: 0.01 10*3/MM3 (ref 0–0.02)
IMM GRANULOCYTES # BLD: 0.02 10*3/MM3 (ref 0–0.02)
IMM GRANULOCYTES # BLD: 0.02 10*3/MM3 (ref 0–0.02)
IMM GRANULOCYTES # BLD: 0.04 10*3/MM3 (ref 0–0.02)
IMM GRANULOCYTES # BLD: 0.07 10*3/MM3 (ref 0–0.02)
IMM GRANULOCYTES # BLD: 0.07 10*3/MM3 (ref 0–0.02)
IMM GRANULOCYTES # BLD: 0.08 10*3/MM3 (ref 0–0.02)
IMM GRANULOCYTES # BLD: 0.09 10*3/MM3 (ref 0–0.02)
IMM GRANULOCYTES # BLD: 0.1 10*3/MM3 (ref 0–0.02)
IMM GRANULOCYTES # BLD: 0.11 10*3/MM3 (ref 0–0.02)
IMM GRANULOCYTES # BLD: 0.11 10*3/MM3 (ref 0–0.02)
IMM GRANULOCYTES # BLD: 0.16 10*3/MM3 (ref 0–0.02)
IMM GRANULOCYTES NFR BLD: 0.2 % (ref 0–0.5)
IMM GRANULOCYTES NFR BLD: 0.3 % (ref 0–0.5)
IMM GRANULOCYTES NFR BLD: 0.3 % (ref 0–0.5)
IMM GRANULOCYTES NFR BLD: 0.5 % (ref 0–0.5)
IMM GRANULOCYTES NFR BLD: 0.5 % (ref 0–0.5)
IMM GRANULOCYTES NFR BLD: 0.6 % (ref 0–0.5)
IMM GRANULOCYTES NFR BLD: 0.7 % (ref 0–0.5)
IMM GRANULOCYTES NFR BLD: 0.8 % (ref 0–0.5)
IMM GRANULOCYTES NFR BLD: 0.8 % (ref 0–0.5)
IMM GRANULOCYTES NFR BLD: 0.9 % (ref 0–0.5)
IMM GRANULOCYTES NFR BLD: 0.9 % (ref 0–0.5)
IMM GRANULOCYTES NFR BLD: 1.1 % (ref 0–0.5)
IMM GRANULOCYTES NFR BLD: 1.2 % (ref 0–0.5)
IMP STRAIN: NOT DETECTED
INR PPP: 1.2 (ref 0.8–1.2)
INR PPP: 1.22 (ref 0.8–1.2)
INR PPP: 1.23 (ref 0.8–1.2)
INR PPP: 1.24 (ref 0.8–1.2)
INR PPP: 1.3 (ref 0.8–1.2)
INR PPP: 1.32 (ref 0.8–1.2)
INR PPP: 1.33 (ref 0.8–1.2)
INR PPP: 1.37 (ref 0.8–1.2)
INR PPP: 1.47 (ref 0.8–1.2)
INR PPP: 1.69 (ref 0.8–1.2)
INR PPP: 1.73 (ref 0.8–1.2)
INR PPP: 1.76 (ref 0.8–1.2)
IRON 24H UR-MRATE: 23 MCG/DL (ref 49–181)
IRON SATN MFR SERPL: 13 % (ref 20–55)
KETONES UR QL STRIP: NEGATIVE
KETONES UR QL STRIP: NEGATIVE
KPC STRAIN: NOT DETECTED
LDH SERPL-CCNC: 525 U/L (ref 313–618)
LEUKOCYTE ESTERASE UR QL STRIP.AUTO: NEGATIVE
LEUKOCYTE ESTERASE UR QL STRIP.AUTO: NEGATIVE
LIPASE SERPL-CCNC: 242 U/L (ref 23–300)
LIPASE SERPL-CCNC: 526 U/L (ref 23–300)
LIPASE SERPL-CCNC: 777 U/L (ref 23–300)
LYMPHOCYTES # BLD AUTO: 0.39 10*3/MM3 (ref 0.6–4.2)
LYMPHOCYTES # BLD AUTO: 0.57 10*3/MM3 (ref 0.6–4.2)
LYMPHOCYTES # BLD AUTO: 0.66 10*3/MM3 (ref 0.6–4.2)
LYMPHOCYTES # BLD AUTO: 0.69 10*3/MM3 (ref 0.6–4.2)
LYMPHOCYTES # BLD AUTO: 0.7 10*3/MM3 (ref 0.6–4.2)
LYMPHOCYTES # BLD AUTO: 0.77 10*3/MM3 (ref 0.6–4.2)
LYMPHOCYTES # BLD AUTO: 0.8 10*3/MM3 (ref 0.6–4.2)
LYMPHOCYTES # BLD AUTO: 0.8 10*3/MM3 (ref 0.6–4.2)
LYMPHOCYTES # BLD AUTO: 0.85 10*3/MM3 (ref 0.6–4.2)
LYMPHOCYTES # BLD AUTO: 0.85 10*3/MM3 (ref 0.6–4.2)
LYMPHOCYTES # BLD AUTO: 0.87 10*3/MM3 (ref 0.6–4.2)
LYMPHOCYTES # BLD AUTO: 0.9 10*3/MM3 (ref 0.6–4.2)
LYMPHOCYTES # BLD AUTO: 1.02 10*3/MM3 (ref 0.6–4.2)
LYMPHOCYTES # BLD AUTO: 1.17 10*3/MM3 (ref 0.6–4.2)
LYMPHOCYTES # BLD AUTO: 1.17 10*3/MM3 (ref 0.6–4.2)
LYMPHOCYTES # BLD AUTO: 1.31 10*3/MM3 (ref 0.6–4.2)
LYMPHOCYTES NFR BLD AUTO: 10.2 % (ref 10–50)
LYMPHOCYTES NFR BLD AUTO: 11.4 % (ref 10–50)
LYMPHOCYTES NFR BLD AUTO: 15 % (ref 10–50)
LYMPHOCYTES NFR BLD AUTO: 15.7 % (ref 10–50)
LYMPHOCYTES NFR BLD AUTO: 16 % (ref 10–50)
LYMPHOCYTES NFR BLD AUTO: 3.9 % (ref 10–50)
LYMPHOCYTES NFR BLD AUTO: 5.2 % (ref 10–50)
LYMPHOCYTES NFR BLD AUTO: 5.6 % (ref 10–50)
LYMPHOCYTES NFR BLD AUTO: 6.1 % (ref 10–50)
LYMPHOCYTES NFR BLD AUTO: 6.4 % (ref 10–50)
LYMPHOCYTES NFR BLD AUTO: 6.7 % (ref 10–50)
LYMPHOCYTES NFR BLD AUTO: 7.3 % (ref 10–50)
LYMPHOCYTES NFR BLD AUTO: 7.6 % (ref 10–50)
LYMPHOCYTES NFR BLD AUTO: 8.1 % (ref 10–50)
LYMPHOCYTES NFR BLD AUTO: 8.2 % (ref 10–50)
LYMPHOCYTES NFR BLD AUTO: 9.2 % (ref 10–50)
Lab: NORMAL
Lab: NORMAL
MAGNESIUM SERPL-MCNC: 2 MG/DL (ref 1.6–2.3)
MCH RBC QN AUTO: 32.8 PG (ref 26.5–34)
MCH RBC QN AUTO: 32.8 PG (ref 26.5–34)
MCH RBC QN AUTO: 32.9 PG (ref 26.5–34)
MCH RBC QN AUTO: 33 PG (ref 26.5–34)
MCH RBC QN AUTO: 33.1 PG (ref 26.5–34)
MCH RBC QN AUTO: 33.1 PG (ref 26.5–34)
MCH RBC QN AUTO: 33.3 PG (ref 26.5–34)
MCH RBC QN AUTO: 33.4 PG (ref 26.5–34)
MCH RBC QN AUTO: 33.5 PG (ref 26.5–34)
MCH RBC QN AUTO: 33.6 PG (ref 26.5–34)
MCH RBC QN AUTO: 34.3 PG (ref 26.5–34)
MCHC RBC AUTO-ENTMCNC: 34 G/DL (ref 31.5–36.3)
MCHC RBC AUTO-ENTMCNC: 34.4 G/DL (ref 31.5–36.3)
MCHC RBC AUTO-ENTMCNC: 34.6 G/DL (ref 31.5–36.3)
MCHC RBC AUTO-ENTMCNC: 34.7 G/DL (ref 31.5–36.3)
MCHC RBC AUTO-ENTMCNC: 34.8 G/DL (ref 31.5–36.3)
MCHC RBC AUTO-ENTMCNC: 34.9 G/DL (ref 31.5–36.3)
MCHC RBC AUTO-ENTMCNC: 34.9 G/DL (ref 31.5–36.3)
MCHC RBC AUTO-ENTMCNC: 35 G/DL (ref 31.5–36.3)
MCHC RBC AUTO-ENTMCNC: 35 G/DL (ref 31.5–36.3)
MCHC RBC AUTO-ENTMCNC: 35.1 G/DL (ref 31.5–36.3)
MCHC RBC AUTO-ENTMCNC: 35.1 G/DL (ref 31.5–36.3)
MCHC RBC AUTO-ENTMCNC: 35.2 G/DL (ref 31.5–36.3)
MCHC RBC AUTO-ENTMCNC: 35.4 G/DL (ref 31.5–36.3)
MCHC RBC AUTO-ENTMCNC: 35.5 G/DL (ref 31.5–36.3)
MCHC RBC AUTO-ENTMCNC: 35.6 G/DL (ref 31.5–36.3)
MCHC RBC AUTO-ENTMCNC: 36 G/DL (ref 31.5–36.3)
MCV RBC AUTO: 92.1 FL (ref 80–98)
MCV RBC AUTO: 92.5 FL (ref 80–98)
MCV RBC AUTO: 93.5 FL (ref 80–98)
MCV RBC AUTO: 93.8 FL (ref 80–98)
MCV RBC AUTO: 93.8 FL (ref 80–98)
MCV RBC AUTO: 93.9 FL (ref 80–98)
MCV RBC AUTO: 94.5 FL (ref 80–98)
MCV RBC AUTO: 95.1 FL (ref 80–98)
MCV RBC AUTO: 95.2 FL (ref 80–98)
MCV RBC AUTO: 95.4 FL (ref 80–98)
MCV RBC AUTO: 95.5 FL (ref 80–98)
MCV RBC AUTO: 95.6 FL (ref 80–98)
MCV RBC AUTO: 96.1 FL (ref 80–98)
MCV RBC AUTO: 96.3 FL (ref 80–98)
MCV RBC AUTO: 96.6 FL (ref 80–98)
MCV RBC AUTO: 96.7 FL (ref 80–98)
MONOCYTES # BLD AUTO: 0.1 10*3/MM3 (ref 0–0.9)
MONOCYTES # BLD AUTO: 0.37 10*3/MM3 (ref 0–0.9)
MONOCYTES # BLD AUTO: 0.54 10*3/MM3 (ref 0–0.9)
MONOCYTES # BLD AUTO: 0.77 10*3/MM3 (ref 0–0.9)
MONOCYTES # BLD AUTO: 0.85 10*3/MM3 (ref 0–0.9)
MONOCYTES # BLD AUTO: 0.9 10*3/MM3 (ref 0–0.9)
MONOCYTES # BLD AUTO: 0.91 10*3/MM3 (ref 0–0.9)
MONOCYTES # BLD AUTO: 0.92 10*3/MM3 (ref 0–0.9)
MONOCYTES # BLD AUTO: 1.11 10*3/MM3 (ref 0–0.9)
MONOCYTES # BLD AUTO: 1.18 10*3/MM3 (ref 0–0.9)
MONOCYTES # BLD AUTO: 1.59 10*3/MM3 (ref 0–0.9)
MONOCYTES # BLD AUTO: 1.75 10*3/MM3 (ref 0–0.9)
MONOCYTES # BLD AUTO: 1.91 10*3/MM3 (ref 0–0.9)
MONOCYTES # BLD AUTO: 1.99 10*3/MM3 (ref 0–0.9)
MONOCYTES # BLD AUTO: 2 10*3/MM3 (ref 0–0.9)
MONOCYTES # BLD AUTO: 2.07 10*3/MM3 (ref 0–0.9)
MONOCYTES NFR BLD AUTO: 10.2 % (ref 0–12)
MONOCYTES NFR BLD AUTO: 10.4 % (ref 0–12)
MONOCYTES NFR BLD AUTO: 11.2 % (ref 0–12)
MONOCYTES NFR BLD AUTO: 12 % (ref 0–12)
MONOCYTES NFR BLD AUTO: 12.3 % (ref 0–12)
MONOCYTES NFR BLD AUTO: 12.8 % (ref 0–12)
MONOCYTES NFR BLD AUTO: 12.9 % (ref 0–12)
MONOCYTES NFR BLD AUTO: 13.8 % (ref 0–12)
MONOCYTES NFR BLD AUTO: 13.9 % (ref 0–12)
MONOCYTES NFR BLD AUTO: 14.9 % (ref 0–12)
MONOCYTES NFR BLD AUTO: 15.3 % (ref 0–12)
MONOCYTES NFR BLD AUTO: 15.3 % (ref 0–12)
MONOCYTES NFR BLD AUTO: 2.1 % (ref 0–12)
MONOCYTES NFR BLD AUTO: 3.6 % (ref 0–12)
MONOCYTES NFR BLD AUTO: 6.6 % (ref 0–12)
MONOCYTES NFR BLD AUTO: 7.2 % (ref 0–12)
MONOS+MACROS NFR FLD: 28 %
MONOS+MACROS NFR FLD: 64 %
MONOS+MACROS NFR FLD: 95 %
NDM STRAIN: NOT DETECTED
NEUTROPHILS # BLD AUTO: 10.44 10*3/MM3 (ref 2–8.6)
NEUTROPHILS # BLD AUTO: 10.48 10*3/MM3 (ref 2–8.6)
NEUTROPHILS # BLD AUTO: 10.86 10*3/MM3 (ref 2–8.6)
NEUTROPHILS # BLD AUTO: 11.84 10*3/MM3 (ref 2–8.6)
NEUTROPHILS # BLD AUTO: 13.57 10*3/MM3 (ref 2–8.6)
NEUTROPHILS # BLD AUTO: 3.69 10*3/MM3 (ref 2–8.6)
NEUTROPHILS # BLD AUTO: 3.98 10*3/MM3 (ref 2–8.6)
NEUTROPHILS # BLD AUTO: 4.32 10*3/MM3 (ref 2–8.6)
NEUTROPHILS # BLD AUTO: 5.14 10*3/MM3 (ref 2–8.6)
NEUTROPHILS # BLD AUTO: 6.53 10*3/MM3 (ref 2–8.6)
NEUTROPHILS # BLD AUTO: 6.89 10*3/MM3 (ref 2–8.6)
NEUTROPHILS # BLD AUTO: 7.79 10*3/MM3 (ref 2–8.6)
NEUTROPHILS # BLD AUTO: 8.38 10*3/MM3 (ref 2–8.6)
NEUTROPHILS # BLD AUTO: 8.41 10*3/MM3 (ref 2–8.6)
NEUTROPHILS # BLD AUTO: 9.67 10*3/MM3 (ref 2–8.6)
NEUTROPHILS # BLD AUTO: 9.75 10*3/MM3 (ref 2–8.6)
NEUTROPHILS NFR BLD AUTO: 66.4 % (ref 37–80)
NEUTROPHILS NFR BLD AUTO: 68.8 % (ref 37–80)
NEUTROPHILS NFR BLD AUTO: 69.4 % (ref 37–80)
NEUTROPHILS NFR BLD AUTO: 75.2 % (ref 37–80)
NEUTROPHILS NFR BLD AUTO: 75.5 % (ref 37–80)
NEUTROPHILS NFR BLD AUTO: 77.6 % (ref 37–80)
NEUTROPHILS NFR BLD AUTO: 77.7 % (ref 37–80)
NEUTROPHILS NFR BLD AUTO: 78.3 % (ref 37–80)
NEUTROPHILS NFR BLD AUTO: 79.1 % (ref 37–80)
NEUTROPHILS NFR BLD AUTO: 79.5 % (ref 37–80)
NEUTROPHILS NFR BLD AUTO: 81.2 % (ref 37–80)
NEUTROPHILS NFR BLD AUTO: 81.6 % (ref 37–80)
NEUTROPHILS NFR BLD AUTO: 81.6 % (ref 37–80)
NEUTROPHILS NFR BLD AUTO: 82.3 % (ref 37–80)
NEUTROPHILS NFR BLD AUTO: 83.9 % (ref 37–80)
NEUTROPHILS NFR BLD AUTO: 89.4 % (ref 37–80)
NEUTROPHILS NFR FLD MANUAL: 36 %
NEUTROPHILS NFR FLD MANUAL: 5 %
NEUTROPHILS NFR FLD MANUAL: 72 %
NITRITE UR QL STRIP: NEGATIVE
NITRITE UR QL STRIP: NEGATIVE
NRBC BLD MANUAL-RTO: 0 /100 WBC (ref 0–0)
NRBC BLD MANUAL-RTO: 0 /100 WBC (ref 0–0)
OXA 48 STRAIN: NOT DETECTED
PH UR STRIP.AUTO: 5.5 [PH] (ref 5–9)
PH UR STRIP.AUTO: 6 [PH] (ref 5–9)
PLATELET # BLD AUTO: 105 10*3/MM3 (ref 150–450)
PLATELET # BLD AUTO: 115 10*3/MM3 (ref 150–450)
PLATELET # BLD AUTO: 118 10*3/MM3 (ref 150–450)
PLATELET # BLD AUTO: 120 10*3/MM3 (ref 150–450)
PLATELET # BLD AUTO: 120 10*3/MM3 (ref 150–450)
PLATELET # BLD AUTO: 128 10*3/MM3 (ref 150–450)
PLATELET # BLD AUTO: 130 10*3/MM3 (ref 150–450)
PLATELET # BLD AUTO: 132 10*3/MM3 (ref 150–450)
PLATELET # BLD AUTO: 140 10*3/MM3 (ref 150–450)
PLATELET # BLD AUTO: 140 10*3/MM3 (ref 150–450)
PLATELET # BLD AUTO: 148 10*3/MM3 (ref 150–450)
PLATELET # BLD AUTO: 152 10*3/MM3 (ref 150–450)
PLATELET # BLD AUTO: 163 10*3/MM3 (ref 150–450)
PLATELET # BLD AUTO: 168 10*3/MM3 (ref 150–450)
PLATELET # BLD AUTO: 174 10*3/MM3 (ref 150–450)
PLATELET # BLD AUTO: 176 10*3/MM3 (ref 150–450)
PMV BLD AUTO: 10.2 FL (ref 8–12)
PMV BLD AUTO: 10.5 FL (ref 8–12)
PMV BLD AUTO: 8.6 FL (ref 8–12)
PMV BLD AUTO: 9.1 FL (ref 8–12)
PMV BLD AUTO: 9.2 FL (ref 8–12)
PMV BLD AUTO: 9.3 FL (ref 8–12)
PMV BLD AUTO: 9.3 FL (ref 8–12)
PMV BLD AUTO: 9.4 FL (ref 8–12)
PMV BLD AUTO: 9.4 FL (ref 8–12)
PMV BLD AUTO: 9.5 FL (ref 8–12)
PMV BLD AUTO: 9.6 FL (ref 8–12)
PMV BLD AUTO: 9.6 FL (ref 8–12)
PMV BLD AUTO: 9.7 FL (ref 8–12)
PMV BLD AUTO: 9.7 FL (ref 8–12)
PMV BLD AUTO: 9.9 FL (ref 8–12)
PMV BLD AUTO: 9.9 FL (ref 8–12)
POTASSIUM BLD-SCNC: 3.2 MMOL/L (ref 3.5–5.1)
POTASSIUM BLD-SCNC: 3.3 MMOL/L (ref 3.5–5.1)
POTASSIUM BLD-SCNC: 3.5 MMOL/L (ref 3.5–5.1)
POTASSIUM BLD-SCNC: 3.7 MMOL/L (ref 3.5–5.1)
POTASSIUM BLD-SCNC: 3.9 MMOL/L (ref 3.5–5.1)
POTASSIUM BLD-SCNC: 4 MMOL/L (ref 3.5–5.1)
POTASSIUM BLD-SCNC: 4.1 MMOL/L (ref 3.5–5.1)
POTASSIUM BLD-SCNC: 4.1 MMOL/L (ref 3.5–5.1)
POTASSIUM BLD-SCNC: 4.4 MMOL/L (ref 3.5–5.1)
POTASSIUM BLD-SCNC: 4.4 MMOL/L (ref 3.5–5.1)
PROT FLD-MCNC: <2 G/DL
PROT SERPL-MCNC: 5.5 G/DL (ref 6.3–8.6)
PROT SERPL-MCNC: 5.6 G/DL (ref 6.3–8.6)
PROT SERPL-MCNC: 5.7 G/DL (ref 6.3–8.6)
PROT SERPL-MCNC: 6 G/DL (ref 6.3–8.6)
PROT SERPL-MCNC: 6 G/DL (ref 6.3–8.6)
PROT SERPL-MCNC: 6.3 G/DL (ref 6.3–8.6)
PROT SERPL-MCNC: 6.4 G/DL (ref 6.3–8.6)
PROT SERPL-MCNC: 6.5 G/DL (ref 6.3–8.6)
PROT SERPL-MCNC: 6.8 G/DL (ref 6.3–8.6)
PROT SERPL-MCNC: 6.8 G/DL (ref 6.3–8.6)
PROT SERPL-MCNC: 6.9 G/DL (ref 6.3–8.6)
PROT SERPL-MCNC: 6.9 G/DL (ref 6.3–8.6)
PROT SERPL-MCNC: 7.2 G/DL (ref 6.3–8.6)
PROT UR QL STRIP: NEGATIVE
PROT UR QL STRIP: NEGATIVE
PROTHROMBIN TIME: 14.9 SECONDS (ref 11.1–15.3)
PROTHROMBIN TIME: 15.1 SECONDS (ref 11.1–15.3)
PROTHROMBIN TIME: 15.2 SECONDS (ref 11.1–15.3)
PROTHROMBIN TIME: 15.3 SECONDS (ref 11.1–15.3)
PROTHROMBIN TIME: 15.8 SECONDS (ref 11.1–15.3)
PROTHROMBIN TIME: 16 SECONDS (ref 11.1–15.3)
PROTHROMBIN TIME: 16.1 SECONDS (ref 11.1–15.3)
PROTHROMBIN TIME: 16.5 SECONDS (ref 11.1–15.3)
PROTHROMBIN TIME: 17.4 SECONDS (ref 11.1–15.3)
PROTHROMBIN TIME: 19.3 SECONDS (ref 11.1–15.3)
PROTHROMBIN TIME: 19.6 SECONDS (ref 11.1–15.3)
PROTHROMBIN TIME: 19.9 SECONDS (ref 11.1–15.3)
RBC # BLD AUTO: 2.22 10*6/MM3 (ref 4.37–5.74)
RBC # BLD AUTO: 2.68 10*6/MM3 (ref 4.37–5.74)
RBC # BLD AUTO: 3.07 10*6/MM3 (ref 4.37–5.74)
RBC # BLD AUTO: 3.17 10*6/MM3 (ref 4.37–5.74)
RBC # BLD AUTO: 3.19 10*6/MM3 (ref 4.37–5.74)
RBC # BLD AUTO: 3.22 10*6/MM3 (ref 4.37–5.74)
RBC # BLD AUTO: 3.3 10*6/MM3 (ref 4.37–5.74)
RBC # BLD AUTO: 3.33 10*6/MM3 (ref 4.37–5.74)
RBC # BLD AUTO: 3.36 10*6/MM3 (ref 4.37–5.74)
RBC # BLD AUTO: 3.38 10*6/MM3 (ref 4.37–5.74)
RBC # BLD AUTO: 3.44 10*6/MM3 (ref 4.37–5.74)
RBC # BLD AUTO: 3.46 10*6/MM3 (ref 4.37–5.74)
RBC # BLD AUTO: 3.51 10*6/MM3 (ref 4.37–5.74)
RBC # BLD AUTO: 3.67 10*6/MM3 (ref 4.37–5.74)
RBC # BLD AUTO: 3.71 10*6/MM3 (ref 4.37–5.74)
RBC # BLD AUTO: 3.76 10*6/MM3 (ref 4.37–5.74)
RBC # FLD AUTO: 346.5 /MM3 (ref 0–0)
RBC # FLD AUTO: ABNORMAL /MM3 (ref 0–0)
RBC # FLD AUTO: ABNORMAL /MM3 (ref 0–0)
RBC SIDE 1: ABNORMAL /MM3
RH BLD: POSITIVE
RH BLD: POSITIVE
SODIUM BLD-SCNC: 127 MMOL/L (ref 137–145)
SODIUM BLD-SCNC: 129 MMOL/L (ref 137–145)
SODIUM BLD-SCNC: 130 MMOL/L (ref 137–145)
SODIUM BLD-SCNC: 131 MMOL/L (ref 137–145)
SODIUM BLD-SCNC: 131 MMOL/L (ref 137–145)
SODIUM BLD-SCNC: 133 MMOL/L (ref 137–145)
SODIUM BLD-SCNC: 133 MMOL/L (ref 137–145)
SODIUM BLD-SCNC: 134 MMOL/L (ref 137–145)
SODIUM BLD-SCNC: 137 MMOL/L (ref 137–145)
SODIUM BLD-SCNC: 137 MMOL/L (ref 137–145)
SODIUM BLD-SCNC: 138 MMOL/L (ref 137–145)
SODIUM BLD-SCNC: 140 MMOL/L (ref 137–145)
SODIUM BLD-SCNC: 140 MMOL/L (ref 137–145)
SP GR UR STRIP: 1.01 (ref 1–1.03)
SP GR UR STRIP: 1.02 (ref 1–1.03)
SPECIMEN VOL FLD: 7000 ML
SPECIMEN VOL FLD: 8000 ML
SPECIMEN VOL FLD: 8500 ML
T&S EXPIRATION DATE: NORMAL
T&S EXPIRATION DATE: NORMAL
T4 FREE SERPL-MCNC: 1.58 NG/DL (ref 0.78–2.19)
TIBC SERPL-MCNC: 178 MCG/DL (ref 261–462)
TROPONIN I SERPL-MCNC: <0.012 NG/ML
TSH SERPL DL<=0.05 MIU/L-ACNC: 3.01 MIU/ML (ref 0.46–4.68)
UNIT  ABO: NORMAL
UNIT  ABO: NORMAL
UNIT  RH: NORMAL
UNIT  RH: NORMAL
UROBILINOGEN UR QL STRIP: ABNORMAL
UROBILINOGEN UR QL STRIP: ABNORMAL
VIM STRAIN: NOT DETECTED
WBC # FLD: 146 /MM3 (ref 0–5)
WBC # FLD: 196.5 /MM3 (ref 0–5)
WBC # FLD: 737 /MM3 (ref 0–5)
WBC NRBC COR # BLD: 10.22 10*3/MM3 (ref 3.2–9.8)
WBC NRBC COR # BLD: 10.36 10*3/MM3 (ref 3.2–9.8)
WBC NRBC COR # BLD: 10.53 10*3/MM3 (ref 3.2–9.8)
WBC NRBC COR # BLD: 12.47 10*3/MM3 (ref 3.2–9.8)
WBC NRBC COR # BLD: 12.49 10*3/MM3 (ref 3.2–9.8)
WBC NRBC COR # BLD: 12.55 10*3/MM3 (ref 3.2–9.8)
WBC NRBC COR # BLD: 12.87 10*3/MM3 (ref 3.2–9.8)
WBC NRBC COR # BLD: 13.89 10*3/MM3 (ref 3.2–9.8)
WBC NRBC COR # BLD: 14.51 10*3/MM3 (ref 3.2–9.8)
WBC NRBC COR # BLD: 16.62 10*3/MM3 (ref 3.2–9.8)
WBC NRBC COR # BLD: 4.83 10*3/MM3 (ref 3.2–9.8)
WBC NRBC COR # BLD: 5.31 10*3/MM3 (ref 3.2–9.8)
WBC NRBC COR # BLD: 5.99 10*3/MM3 (ref 3.2–9.8)
WBC NRBC COR # BLD: 7.47 10*3/MM3 (ref 3.2–9.8)
WBC NRBC COR # BLD: 8.65 10*3/MM3 (ref 3.2–9.8)
WBC NRBC COR # BLD: 8.72 10*3/MM3 (ref 3.2–9.8)
WHOLE BLOOD HOLD SPECIMEN: NORMAL

## 2018-01-01 PROCEDURE — 89051 BODY FLUID CELL COUNT: CPT | Performed by: EMERGENCY MEDICINE

## 2018-01-01 PROCEDURE — 80053 COMPREHEN METABOLIC PANEL: CPT | Performed by: INTERNAL MEDICINE

## 2018-01-01 PROCEDURE — 86923 COMPATIBILITY TEST ELECTRIC: CPT

## 2018-01-01 PROCEDURE — 71045 X-RAY EXAM CHEST 1 VIEW: CPT

## 2018-01-01 PROCEDURE — 82962 GLUCOSE BLOOD TEST: CPT

## 2018-01-01 PROCEDURE — 85018 HEMOGLOBIN: CPT | Performed by: FAMILY MEDICINE

## 2018-01-01 PROCEDURE — 85014 HEMATOCRIT: CPT | Performed by: FAMILY MEDICINE

## 2018-01-01 PROCEDURE — 76942 ECHO GUIDE FOR BIOPSY: CPT

## 2018-01-01 PROCEDURE — 87205 SMEAR GRAM STAIN: CPT | Performed by: INTERNAL MEDICINE

## 2018-01-01 PROCEDURE — 93005 ELECTROCARDIOGRAM TRACING: CPT | Performed by: EMERGENCY MEDICINE

## 2018-01-01 PROCEDURE — 96376 TX/PRO/DX INJ SAME DRUG ADON: CPT

## 2018-01-01 PROCEDURE — 85730 THROMBOPLASTIN TIME PARTIAL: CPT | Performed by: EMERGENCY MEDICINE

## 2018-01-01 PROCEDURE — 82042 OTHER SOURCE ALBUMIN QUAN EA: CPT | Performed by: EMERGENCY MEDICINE

## 2018-01-01 PROCEDURE — 86900 BLOOD TYPING SEROLOGIC ABO: CPT

## 2018-01-01 PROCEDURE — 80053 COMPREHEN METABOLIC PANEL: CPT | Performed by: EMERGENCY MEDICINE

## 2018-01-01 PROCEDURE — 85025 COMPLETE CBC W/AUTO DIFF WBC: CPT | Performed by: INTERNAL MEDICINE

## 2018-01-01 PROCEDURE — G8978 MOBILITY CURRENT STATUS: HCPCS

## 2018-01-01 PROCEDURE — 80162 ASSAY OF DIGOXIN TOTAL: CPT | Performed by: EMERGENCY MEDICINE

## 2018-01-01 PROCEDURE — 87081 CULTURE SCREEN ONLY: CPT | Performed by: INTERNAL MEDICINE

## 2018-01-01 PROCEDURE — 36430 TRANSFUSION BLD/BLD COMPNT: CPT

## 2018-01-01 PROCEDURE — 83735 ASSAY OF MAGNESIUM: CPT | Performed by: EMERGENCY MEDICINE

## 2018-01-01 PROCEDURE — 83690 ASSAY OF LIPASE: CPT | Performed by: EMERGENCY MEDICINE

## 2018-01-01 PROCEDURE — 96365 THER/PROPH/DIAG IV INF INIT: CPT

## 2018-01-01 PROCEDURE — 97535 SELF CARE MNGMENT TRAINING: CPT

## 2018-01-01 PROCEDURE — 99231 SBSQ HOSP IP/OBS SF/LOW 25: CPT | Performed by: INTERNAL MEDICINE

## 2018-01-01 PROCEDURE — 25010000002 FUROSEMIDE PER 20 MG: Performed by: INTERNAL MEDICINE

## 2018-01-01 PROCEDURE — 25010000002 CEFTRIAXONE: Performed by: FAMILY MEDICINE

## 2018-01-01 PROCEDURE — 85025 COMPLETE CBC W/AUTO DIFF WBC: CPT | Performed by: EMERGENCY MEDICINE

## 2018-01-01 PROCEDURE — 36415 COLL VENOUS BLD VENIPUNCTURE: CPT | Performed by: INTERNAL MEDICINE

## 2018-01-01 PROCEDURE — P9047 ALBUMIN (HUMAN), 25%, 50ML: HCPCS | Performed by: HOSPITALIST

## 2018-01-01 PROCEDURE — G0378 HOSPITAL OBSERVATION PER HR: HCPCS

## 2018-01-01 PROCEDURE — 87081 CULTURE SCREEN ONLY: CPT | Performed by: FAMILY MEDICINE

## 2018-01-01 PROCEDURE — 84484 ASSAY OF TROPONIN QUANT: CPT | Performed by: EMERGENCY MEDICINE

## 2018-01-01 PROCEDURE — 63710000001 PREDNISONE PER 5 MG: Performed by: FAMILY MEDICINE

## 2018-01-01 PROCEDURE — 89051 BODY FLUID CELL COUNT: CPT | Performed by: INTERNAL MEDICINE

## 2018-01-01 PROCEDURE — 25010000002 ALBUMIN HUMAN 25% PER 50 ML: Performed by: INTERNAL MEDICINE

## 2018-01-01 PROCEDURE — 85025 COMPLETE CBC W/AUTO DIFF WBC: CPT | Performed by: FAMILY MEDICINE

## 2018-01-01 PROCEDURE — 99283 EMERGENCY DEPT VISIT LOW MDM: CPT

## 2018-01-01 PROCEDURE — G8988 SELF CARE GOAL STATUS: HCPCS

## 2018-01-01 PROCEDURE — 36415 COLL VENOUS BLD VENIPUNCTURE: CPT | Performed by: EMERGENCY MEDICINE

## 2018-01-01 PROCEDURE — 25010000002 CEFTRIAXONE PER 250 MG: Performed by: INTERNAL MEDICINE

## 2018-01-01 PROCEDURE — 63710000001 INSULIN ASPART PER 5 UNITS: Performed by: INTERNAL MEDICINE

## 2018-01-01 PROCEDURE — 96375 TX/PRO/DX INJ NEW DRUG ADDON: CPT

## 2018-01-01 PROCEDURE — 99225 PR SBSQ OBSERVATION CARE/DAY 25 MINUTES: CPT | Performed by: INTERNAL MEDICINE

## 2018-01-01 PROCEDURE — 99284 EMERGENCY DEPT VISIT MOD MDM: CPT

## 2018-01-01 PROCEDURE — 85610 PROTHROMBIN TIME: CPT | Performed by: INTERNAL MEDICINE

## 2018-01-01 PROCEDURE — 84439 ASSAY OF FREE THYROXINE: CPT | Performed by: EMERGENCY MEDICINE

## 2018-01-01 PROCEDURE — P9047 ALBUMIN (HUMAN), 25%, 50ML: HCPCS | Performed by: INTERNAL MEDICINE

## 2018-01-01 PROCEDURE — 85610 PROTHROMBIN TIME: CPT | Performed by: EMERGENCY MEDICINE

## 2018-01-01 PROCEDURE — 83540 ASSAY OF IRON: CPT | Performed by: INTERNAL MEDICINE

## 2018-01-01 PROCEDURE — 82140 ASSAY OF AMMONIA: CPT | Performed by: FAMILY MEDICINE

## 2018-01-01 PROCEDURE — 87040 BLOOD CULTURE FOR BACTERIA: CPT | Performed by: EMERGENCY MEDICINE

## 2018-01-01 PROCEDURE — 86850 RBC ANTIBODY SCREEN: CPT | Performed by: EMERGENCY MEDICINE

## 2018-01-01 PROCEDURE — 25010000002 ALBUMIN HUMAN 25% PER 50 ML: Performed by: EMERGENCY MEDICINE

## 2018-01-01 PROCEDURE — 83605 ASSAY OF LACTIC ACID: CPT | Performed by: EMERGENCY MEDICINE

## 2018-01-01 PROCEDURE — 0W9G3ZZ DRAINAGE OF PERITONEAL CAVITY, PERCUTANEOUS APPROACH: ICD-10-PCS | Performed by: RADIOLOGY

## 2018-01-01 PROCEDURE — 25010000002 AZITHROMYCIN PER 500 MG: Performed by: EMERGENCY MEDICINE

## 2018-01-01 PROCEDURE — 25010000002 ALBUMIN HUMAN 25% PER 50 ML: Performed by: HOSPITALIST

## 2018-01-01 PROCEDURE — 86900 BLOOD TYPING SEROLOGIC ABO: CPT | Performed by: EMERGENCY MEDICINE

## 2018-01-01 PROCEDURE — 99285 EMERGENCY DEPT VISIT HI MDM: CPT

## 2018-01-01 PROCEDURE — 71046 X-RAY EXAM CHEST 2 VIEWS: CPT

## 2018-01-01 PROCEDURE — P9016 RBC LEUKOCYTES REDUCED: HCPCS

## 2018-01-01 PROCEDURE — 99213 OFFICE O/P EST LOW 20 MIN: CPT | Performed by: INTERNAL MEDICINE

## 2018-01-01 PROCEDURE — 86900 BLOOD TYPING SEROLOGIC ABO: CPT | Performed by: INTERNAL MEDICINE

## 2018-01-01 PROCEDURE — 97116 GAIT TRAINING THERAPY: CPT

## 2018-01-01 PROCEDURE — 82550 ASSAY OF CK (CPK): CPT | Performed by: EMERGENCY MEDICINE

## 2018-01-01 PROCEDURE — 93010 ELECTROCARDIOGRAM REPORT: CPT | Performed by: INTERNAL MEDICINE

## 2018-01-01 PROCEDURE — 87070 CULTURE OTHR SPECIMN AEROBIC: CPT | Performed by: INTERNAL MEDICINE

## 2018-01-01 PROCEDURE — P9046 ALBUMIN (HUMAN), 25%, 20 ML: HCPCS | Performed by: HOSPITALIST

## 2018-01-01 PROCEDURE — 87015 SPECIMEN INFECT AGNT CONCNTJ: CPT | Performed by: EMERGENCY MEDICINE

## 2018-01-01 PROCEDURE — 99224 PR SBSQ OBSERVATION CARE/DAY 15 MINUTES: CPT | Performed by: INTERNAL MEDICINE

## 2018-01-01 PROCEDURE — 96367 TX/PROPH/DG ADDL SEQ IV INF: CPT

## 2018-01-01 PROCEDURE — 82105 ALPHA-FETOPROTEIN SERUM: CPT | Performed by: INTERNAL MEDICINE

## 2018-01-01 PROCEDURE — 97166 OT EVAL MOD COMPLEX 45 MIN: CPT

## 2018-01-01 PROCEDURE — 82553 CREATINE MB FRACTION: CPT | Performed by: EMERGENCY MEDICINE

## 2018-01-01 PROCEDURE — 0 IOPAMIDOL PER 1 ML: Performed by: HOSPITALIST

## 2018-01-01 PROCEDURE — 63710000001 PREDNISONE PER 1 MG: Performed by: FAMILY MEDICINE

## 2018-01-01 PROCEDURE — G8979 MOBILITY GOAL STATUS: HCPCS

## 2018-01-01 PROCEDURE — 87015 SPECIMEN INFECT AGNT CONCNTJ: CPT | Performed by: INTERNAL MEDICINE

## 2018-01-01 PROCEDURE — 85379 FIBRIN DEGRADATION QUANT: CPT | Performed by: EMERGENCY MEDICINE

## 2018-01-01 PROCEDURE — 87081 CULTURE SCREEN ONLY: CPT | Performed by: HOSPITALIST

## 2018-01-01 PROCEDURE — P9046 ALBUMIN (HUMAN), 25%, 20 ML: HCPCS | Performed by: EMERGENCY MEDICINE

## 2018-01-01 PROCEDURE — 97162 PT EVAL MOD COMPLEX 30 MIN: CPT

## 2018-01-01 PROCEDURE — 93005 ELECTROCARDIOGRAM TRACING: CPT | Performed by: FAMILY MEDICINE

## 2018-01-01 PROCEDURE — 80053 COMPREHEN METABOLIC PANEL: CPT | Performed by: FAMILY MEDICINE

## 2018-01-01 PROCEDURE — 96360 HYDRATION IV INFUSION INIT: CPT

## 2018-01-01 PROCEDURE — 83605 ASSAY OF LACTIC ACID: CPT | Performed by: INTERNAL MEDICINE

## 2018-01-01 PROCEDURE — 82150 ASSAY OF AMYLASE: CPT | Performed by: EMERGENCY MEDICINE

## 2018-01-01 PROCEDURE — 99214 OFFICE O/P EST MOD 30 MIN: CPT | Performed by: INTERNAL MEDICINE

## 2018-01-01 PROCEDURE — 81003 URINALYSIS AUTO W/O SCOPE: CPT | Performed by: EMERGENCY MEDICINE

## 2018-01-01 PROCEDURE — 25010000002 CEFTRIAXONE: Performed by: EMERGENCY MEDICINE

## 2018-01-01 PROCEDURE — 87205 SMEAR GRAM STAIN: CPT | Performed by: EMERGENCY MEDICINE

## 2018-01-01 PROCEDURE — 84157 ASSAY OF PROTEIN OTHER: CPT | Performed by: EMERGENCY MEDICINE

## 2018-01-01 PROCEDURE — 87040 BLOOD CULTURE FOR BACTERIA: CPT | Performed by: INTERNAL MEDICINE

## 2018-01-01 PROCEDURE — 86901 BLOOD TYPING SEROLOGIC RH(D): CPT | Performed by: EMERGENCY MEDICINE

## 2018-01-01 PROCEDURE — 86850 RBC ANTIBODY SCREEN: CPT | Performed by: INTERNAL MEDICINE

## 2018-01-01 PROCEDURE — 63710000001 DIPHENHYDRAMINE PER 50 MG: Performed by: FAMILY MEDICINE

## 2018-01-01 PROCEDURE — 86901 BLOOD TYPING SEROLOGIC RH(D): CPT | Performed by: INTERNAL MEDICINE

## 2018-01-01 PROCEDURE — 63710000001 PREDNISONE PER 5 MG: Performed by: EMERGENCY MEDICINE

## 2018-01-01 PROCEDURE — 83615 LACTATE (LD) (LDH) ENZYME: CPT | Performed by: INTERNAL MEDICINE

## 2018-01-01 PROCEDURE — 63710000001 PREDNISONE PER 1 MG: Performed by: EMERGENCY MEDICINE

## 2018-01-01 PROCEDURE — 94760 N-INVAS EAR/PLS OXIMETRY 1: CPT

## 2018-01-01 PROCEDURE — 25010000002 FUROSEMIDE PER 20 MG: Performed by: FAMILY MEDICINE

## 2018-01-01 PROCEDURE — 82140 ASSAY OF AMMONIA: CPT | Performed by: EMERGENCY MEDICINE

## 2018-01-01 PROCEDURE — G8987 SELF CARE CURRENT STATUS: HCPCS

## 2018-01-01 PROCEDURE — 85610 PROTHROMBIN TIME: CPT | Performed by: FAMILY MEDICINE

## 2018-01-01 PROCEDURE — 80048 BASIC METABOLIC PNL TOTAL CA: CPT | Performed by: FAMILY MEDICINE

## 2018-01-01 PROCEDURE — 87070 CULTURE OTHR SPECIMN AEROBIC: CPT | Performed by: EMERGENCY MEDICINE

## 2018-01-01 PROCEDURE — 36415 COLL VENOUS BLD VENIPUNCTURE: CPT

## 2018-01-01 PROCEDURE — 71275 CT ANGIOGRAPHY CHEST: CPT

## 2018-01-01 PROCEDURE — 96366 THER/PROPH/DIAG IV INF ADDON: CPT

## 2018-01-01 PROCEDURE — 83550 IRON BINDING TEST: CPT | Performed by: INTERNAL MEDICINE

## 2018-01-01 PROCEDURE — 80048 BASIC METABOLIC PNL TOTAL CA: CPT | Performed by: INTERNAL MEDICINE

## 2018-01-01 PROCEDURE — 84443 ASSAY THYROID STIM HORMONE: CPT | Performed by: EMERGENCY MEDICINE

## 2018-01-01 RX ORDER — ALBUMIN (HUMAN) 12.5 G/50ML
50 SOLUTION INTRAVENOUS ONCE
Status: COMPLETED | OUTPATIENT
Start: 2018-01-01 | End: 2018-01-01

## 2018-01-01 RX ORDER — HYDROCODONE BITARTRATE AND ACETAMINOPHEN 5; 325 MG/1; MG/1
1 TABLET ORAL EVERY 8 HOURS PRN
Status: DISCONTINUED | OUTPATIENT
Start: 2018-01-01 | End: 2018-01-01 | Stop reason: HOSPADM

## 2018-01-01 RX ORDER — DIGOXIN 125 MCG
125 TABLET ORAL
Status: DISCONTINUED | OUTPATIENT
Start: 2018-01-01 | End: 2018-01-01 | Stop reason: HOSPADM

## 2018-01-01 RX ORDER — SODIUM CHLORIDE 0.9 % (FLUSH) 0.9 %
10 SYRINGE (ML) INJECTION AS NEEDED
Status: DISCONTINUED | OUTPATIENT
Start: 2018-01-01 | End: 2018-01-01 | Stop reason: HOSPADM

## 2018-01-01 RX ORDER — LEVOFLOXACIN 500 MG/1
500 TABLET, FILM COATED ORAL DAILY
COMMUNITY
End: 2018-01-01

## 2018-01-01 RX ORDER — SPIRONOLACTONE 50 MG/1
50 TABLET, FILM COATED ORAL DAILY
Status: ON HOLD
Start: 2018-01-01 | End: 2018-01-01

## 2018-01-01 RX ORDER — HYDROCODONE BITARTRATE AND ACETAMINOPHEN 5; 325 MG/1; MG/1
1 TABLET ORAL EVERY 8 HOURS PRN
Status: ON HOLD | COMMUNITY
End: 2018-01-01

## 2018-01-01 RX ORDER — ALBUMIN (HUMAN) 12.5 G/50ML
25 SOLUTION INTRAVENOUS ONCE
Status: COMPLETED | OUTPATIENT
Start: 2018-01-01 | End: 2018-01-01

## 2018-01-01 RX ORDER — SODIUM CHLORIDE 9 MG/ML
125 INJECTION, SOLUTION INTRAVENOUS CONTINUOUS
Status: DISCONTINUED | OUTPATIENT
Start: 2018-01-01 | End: 2018-01-01

## 2018-01-01 RX ORDER — FAMOTIDINE 20 MG/1
20 TABLET, FILM COATED ORAL EVERY MORNING
Status: DISCONTINUED | OUTPATIENT
Start: 2018-01-01 | End: 2018-01-01 | Stop reason: HOSPADM

## 2018-01-01 RX ORDER — DILTIAZEM HYDROCHLORIDE 120 MG/1
120 CAPSULE, COATED, EXTENDED RELEASE ORAL DAILY
Status: DISCONTINUED | OUTPATIENT
Start: 2018-01-01 | End: 2018-01-01 | Stop reason: HOSPADM

## 2018-01-01 RX ORDER — LACTULOSE 10 G/15ML
10 SOLUTION ORAL 3 TIMES DAILY
Status: DISCONTINUED | OUTPATIENT
Start: 2018-01-01 | End: 2018-01-01 | Stop reason: HOSPADM

## 2018-01-01 RX ORDER — PAROXETINE 10 MG/1
10 TABLET, FILM COATED ORAL EVERY MORNING
Status: DISCONTINUED | OUTPATIENT
Start: 2018-01-01 | End: 2018-01-01 | Stop reason: HOSPADM

## 2018-01-01 RX ORDER — RISPERIDONE 1 MG/1
3 TABLET ORAL NIGHTLY
Status: DISCONTINUED | OUTPATIENT
Start: 2018-01-01 | End: 2018-01-01 | Stop reason: HOSPADM

## 2018-01-01 RX ORDER — ALBUMIN (HUMAN) 12.5 G/50ML
12.5 SOLUTION INTRAVENOUS ONCE
Status: CANCELLED | OUTPATIENT
Start: 2018-01-01 | End: 2018-01-01

## 2018-01-01 RX ORDER — ALBUMIN (HUMAN) 12.5 G/50ML
12.5 SOLUTION INTRAVENOUS ONCE
Status: COMPLETED | OUTPATIENT
Start: 2018-01-01 | End: 2018-01-01

## 2018-01-01 RX ORDER — DEXTROSE MONOHYDRATE 25 G/50ML
25 INJECTION, SOLUTION INTRAVENOUS
Status: DISCONTINUED | OUTPATIENT
Start: 2018-01-01 | End: 2018-01-01 | Stop reason: HOSPADM

## 2018-01-01 RX ORDER — SODIUM CHLORIDE 0.9 % (FLUSH) 0.9 %
1-10 SYRINGE (ML) INJECTION AS NEEDED
Status: DISCONTINUED | OUTPATIENT
Start: 2018-01-01 | End: 2018-01-01 | Stop reason: HOSPADM

## 2018-01-01 RX ORDER — SPIRONOLACTONE 50 MG/1
50 TABLET, FILM COATED ORAL DAILY
Status: DISCONTINUED | OUTPATIENT
Start: 2018-01-01 | End: 2018-01-01 | Stop reason: HOSPADM

## 2018-01-01 RX ORDER — FUROSEMIDE 10 MG/ML
20 INJECTION INTRAMUSCULAR; INTRAVENOUS ONCE
Status: DISCONTINUED | OUTPATIENT
Start: 2018-01-01 | End: 2018-01-01 | Stop reason: HOSPADM

## 2018-01-01 RX ORDER — METOPROLOL TARTRATE 5 MG/5ML
5 INJECTION INTRAVENOUS ONCE
Status: DISCONTINUED | OUTPATIENT
Start: 2018-01-01 | End: 2018-01-01 | Stop reason: HOSPADM

## 2018-01-01 RX ORDER — FUROSEMIDE 10 MG/ML
20 INJECTION INTRAMUSCULAR; INTRAVENOUS ONCE
Status: COMPLETED | OUTPATIENT
Start: 2018-01-01 | End: 2018-01-01

## 2018-01-01 RX ORDER — LACTULOSE 10 G/15ML
10 SOLUTION ORAL DAILY
Status: DISCONTINUED | OUTPATIENT
Start: 2018-01-01 | End: 2018-01-01 | Stop reason: HOSPADM

## 2018-01-01 RX ORDER — ALBUMIN (HUMAN) 12.5 G/50ML
12.5 SOLUTION INTRAVENOUS ONCE
Status: DISCONTINUED | OUTPATIENT
Start: 2018-01-01 | End: 2018-01-01 | Stop reason: HOSPADM

## 2018-01-01 RX ORDER — SODIUM CHLORIDE 9 MG/ML
50 INJECTION, SOLUTION INTRAVENOUS CONTINUOUS
Status: DISCONTINUED | OUTPATIENT
Start: 2018-01-01 | End: 2018-01-01 | Stop reason: HOSPADM

## 2018-01-01 RX ORDER — POTASSIUM CHLORIDE 20 MEQ/1
20 TABLET, EXTENDED RELEASE ORAL 2 TIMES DAILY
COMMUNITY
End: 2018-01-01 | Stop reason: HOSPADM

## 2018-01-01 RX ORDER — BUSPIRONE HYDROCHLORIDE 10 MG/1
5 TABLET ORAL 3 TIMES DAILY
Status: DISCONTINUED | OUTPATIENT
Start: 2018-01-01 | End: 2018-01-01 | Stop reason: HOSPADM

## 2018-01-01 RX ORDER — FUROSEMIDE 40 MG/1
40 TABLET ORAL EVERY MORNING
Status: DISCONTINUED | OUTPATIENT
Start: 2018-01-01 | End: 2018-01-01 | Stop reason: HOSPADM

## 2018-01-01 RX ORDER — ACETAMINOPHEN 500 MG
500 TABLET ORAL EVERY 4 HOURS PRN
Status: DISCONTINUED | OUTPATIENT
Start: 2018-01-01 | End: 2018-01-01 | Stop reason: HOSPADM

## 2018-01-01 RX ORDER — ACETAMINOPHEN 500 MG
500 TABLET ORAL EVERY 4 HOURS PRN
COMMUNITY
End: 2018-01-01 | Stop reason: HOSPADM

## 2018-01-01 RX ORDER — FAMOTIDINE 20 MG/1
20 TABLET, FILM COATED ORAL EVERY MORNING
COMMUNITY

## 2018-01-01 RX ORDER — BUSPIRONE HYDROCHLORIDE 5 MG/1
5 TABLET ORAL 3 TIMES DAILY
COMMUNITY

## 2018-01-01 RX ORDER — HYDROCODONE BITARTRATE AND ACETAMINOPHEN 5; 325 MG/1; MG/1
1 TABLET ORAL ONCE
Status: COMPLETED | OUTPATIENT
Start: 2018-01-01 | End: 2018-01-01

## 2018-01-01 RX ORDER — SPIRONOLACTONE 25 MG/1
50 TABLET ORAL DAILY
Qty: 30 TABLET | Refills: 5 | Status: SHIPPED | OUTPATIENT
Start: 2018-01-01 | End: 2018-01-01

## 2018-01-01 RX ORDER — ALBUTEROL SULFATE 90 UG/1
2 AEROSOL, METERED RESPIRATORY (INHALATION)
Start: 2018-01-01

## 2018-01-01 RX ORDER — SPIRONOLACTONE 50 MG/1
50 TABLET, FILM COATED ORAL DAILY
Start: 2018-01-01

## 2018-01-01 RX ORDER — DOXYCYCLINE 100 MG/1
100 CAPSULE ORAL 2 TIMES DAILY
Qty: 10 CAPSULE | Refills: 0
Start: 2018-01-01 | End: 2018-01-01

## 2018-01-01 RX ORDER — ONDANSETRON 2 MG/ML
4 INJECTION INTRAMUSCULAR; INTRAVENOUS ONCE
Status: DISCONTINUED | OUTPATIENT
Start: 2018-01-01 | End: 2018-01-01 | Stop reason: HOSPADM

## 2018-01-01 RX ORDER — DIPHENHYDRAMINE HCL 25 MG
50 TABLET ORAL ONCE
Status: DISCONTINUED | OUTPATIENT
Start: 2018-01-01 | End: 2018-01-01 | Stop reason: RX

## 2018-01-01 RX ORDER — FUROSEMIDE 10 MG/ML
20 INJECTION INTRAMUSCULAR; INTRAVENOUS EVERY 12 HOURS
Status: DISCONTINUED | OUTPATIENT
Start: 2018-01-01 | End: 2018-01-01 | Stop reason: HOSPADM

## 2018-01-01 RX ORDER — BUSPIRONE HYDROCHLORIDE 5 MG/1
5 TABLET ORAL 3 TIMES DAILY
Status: DISCONTINUED | OUTPATIENT
Start: 2018-01-01 | End: 2018-01-01 | Stop reason: HOSPADM

## 2018-01-01 RX ORDER — DIPHENHYDRAMINE HCL 50 MG
50 CAPSULE ORAL ONCE
Status: COMPLETED | OUTPATIENT
Start: 2018-01-01 | End: 2018-01-01

## 2018-01-01 RX ORDER — FLUTICASONE PROPIONATE 50 MCG
2 SPRAY, SUSPENSION (ML) NASAL DAILY
Status: DISCONTINUED | OUTPATIENT
Start: 2018-01-01 | End: 2018-01-01 | Stop reason: HOSPADM

## 2018-01-01 RX ORDER — DOXYCYCLINE 50 MG/1
100 CAPSULE ORAL 2 TIMES DAILY
COMMUNITY
End: 2018-01-01 | Stop reason: HOSPADM

## 2018-01-01 RX ORDER — POLYETHYLENE GLYCOL 3350 17 G/17G
17 POWDER, FOR SOLUTION ORAL DAILY
Status: ON HOLD | COMMUNITY
End: 2018-01-01

## 2018-01-01 RX ORDER — DIGOXIN 125 MCG
125 TABLET ORAL
COMMUNITY

## 2018-01-01 RX ORDER — DILTIAZEM HYDROCHLORIDE 5 MG/ML
10 INJECTION INTRAVENOUS ONCE
Status: COMPLETED | OUTPATIENT
Start: 2018-01-01 | End: 2018-01-01

## 2018-01-01 RX ORDER — HYDROCODONE BITARTRATE AND ACETAMINOPHEN 5; 325 MG/1; MG/1
1 TABLET ORAL EVERY 8 HOURS PRN
Qty: 20 TABLET | Refills: 0 | Status: SHIPPED | OUTPATIENT
Start: 2018-01-01

## 2018-01-01 RX ORDER — HYDROCODONE BITARTRATE AND ACETAMINOPHEN 5; 325 MG/1; MG/1
1 TABLET ORAL EVERY 6 HOURS PRN
Status: DISCONTINUED | OUTPATIENT
Start: 2018-01-01 | End: 2018-01-01 | Stop reason: HOSPADM

## 2018-01-01 RX ORDER — PAROXETINE 10 MG/1
10 TABLET, FILM COATED ORAL EVERY MORNING
COMMUNITY

## 2018-01-01 RX ORDER — LACTULOSE 10 G/15ML
10 SOLUTION ORAL 2 TIMES DAILY
Start: 2018-01-01

## 2018-01-01 RX ORDER — POTASSIUM CHLORIDE 750 MG/1
10 TABLET, FILM COATED, EXTENDED RELEASE ORAL EVERY MORNING
Status: ON HOLD | COMMUNITY
End: 2018-01-01

## 2018-01-01 RX ORDER — NICOTINE POLACRILEX 4 MG
15 LOZENGE BUCCAL
Status: DISCONTINUED | OUTPATIENT
Start: 2018-01-01 | End: 2018-01-01 | Stop reason: HOSPADM

## 2018-01-01 RX ORDER — ONDANSETRON 2 MG/ML
4 INJECTION INTRAMUSCULAR; INTRAVENOUS EVERY 6 HOURS PRN
Status: DISCONTINUED | OUTPATIENT
Start: 2018-01-01 | End: 2018-01-01 | Stop reason: HOSPADM

## 2018-01-01 RX ADMIN — DILTIAZEM HYDROCHLORIDE 120 MG: 120 CAPSULE, COATED, EXTENDED RELEASE ORAL at 08:10

## 2018-01-01 RX ADMIN — HYDROCODONE BITARTRATE AND ACETAMINOPHEN 1 TABLET: 5; 325 TABLET ORAL at 18:11

## 2018-01-01 RX ADMIN — ALBUMIN HUMAN 12.5 G: 0.25 SOLUTION INTRAVENOUS at 17:36

## 2018-01-01 RX ADMIN — PAROXETINE 10 MG: 10 TABLET, FILM COATED ORAL at 06:17

## 2018-01-01 RX ADMIN — IOPAMIDOL 69 ML: 755 INJECTION, SOLUTION INTRAVENOUS at 08:45

## 2018-01-01 RX ADMIN — HYDROCODONE BITARTRATE AND ACETAMINOPHEN 1 TABLET: 5; 325 TABLET ORAL at 17:08

## 2018-01-01 RX ADMIN — HYDROCODONE BITARTRATE AND ACETAMINOPHEN 1 TABLET: 5; 325 TABLET ORAL at 15:24

## 2018-01-01 RX ADMIN — FUROSEMIDE 20 MG: 10 INJECTION, SOLUTION INTRAMUSCULAR; INTRAVENOUS at 09:09

## 2018-01-01 RX ADMIN — ALBUMIN HUMAN 12.5 G: 0.25 SOLUTION INTRAVENOUS at 22:47

## 2018-01-01 RX ADMIN — FLUTICASONE PROPIONATE 2 SPRAY: 50 SPRAY, METERED NASAL at 08:26

## 2018-01-01 RX ADMIN — BUSPIRONE HYDROCHLORIDE 5 MG: 10 TABLET ORAL at 09:59

## 2018-01-01 RX ADMIN — SPIRONOLACTONE 50 MG: 50 TABLET, FILM COATED ORAL at 09:09

## 2018-01-01 RX ADMIN — SODIUM CHLORIDE 50 ML/HR: 9 INJECTION, SOLUTION INTRAVENOUS at 02:43

## 2018-01-01 RX ADMIN — DIGOXIN 125 MCG: 125 TABLET ORAL at 11:38

## 2018-01-01 RX ADMIN — FUROSEMIDE 20 MG: 10 INJECTION, SOLUTION INTRAMUSCULAR; INTRAVENOUS at 20:55

## 2018-01-01 RX ADMIN — AZITHROMYCIN MONOHYDRATE 500 MG: 500 INJECTION, POWDER, LYOPHILIZED, FOR SOLUTION INTRAVENOUS at 20:48

## 2018-01-01 RX ADMIN — HYDROCODONE BITARTRATE AND ACETAMINOPHEN 1 TABLET: 5; 325 TABLET ORAL at 21:45

## 2018-01-01 RX ADMIN — CEFTRIAXONE SODIUM 1 G: 1 INJECTION, POWDER, FOR SOLUTION INTRAMUSCULAR; INTRAVENOUS at 19:39

## 2018-01-01 RX ADMIN — DILTIAZEM HYDROCHLORIDE 120 MG: 120 CAPSULE, COATED, EXTENDED RELEASE ORAL at 08:25

## 2018-01-01 RX ADMIN — PAROXETINE 10 MG: 10 TABLET, FILM COATED ORAL at 16:12

## 2018-01-01 RX ADMIN — DILTIAZEM HYDROCHLORIDE 120 MG: 120 CAPSULE, COATED, EXTENDED RELEASE ORAL at 10:52

## 2018-01-01 RX ADMIN — FUROSEMIDE 20 MG: 10 INJECTION, SOLUTION INTRAMUSCULAR; INTRAVENOUS at 20:35

## 2018-01-01 RX ADMIN — BUSPIRONE HYDROCHLORIDE 5 MG: 10 TABLET ORAL at 08:17

## 2018-01-01 RX ADMIN — FAMOTIDINE 20 MG: 20 TABLET, FILM COATED ORAL at 07:09

## 2018-01-01 RX ADMIN — BUSPIRONE HYDROCHLORIDE 5 MG: 10 TABLET ORAL at 08:24

## 2018-01-01 RX ADMIN — DIGOXIN 125 MCG: 125 TABLET ORAL at 11:55

## 2018-01-01 RX ADMIN — BUSPIRONE HYDROCHLORIDE 5 MG: 5 TABLET ORAL at 09:09

## 2018-01-01 RX ADMIN — FLUTICASONE PROPIONATE 2 SPRAY: 50 SPRAY, METERED NASAL at 08:13

## 2018-01-01 RX ADMIN — BUSPIRONE HYDROCHLORIDE 5 MG: 10 TABLET ORAL at 16:18

## 2018-01-01 RX ADMIN — RISPERIDONE 3 MG: 1 TABLET ORAL at 21:38

## 2018-01-01 RX ADMIN — LACTULOSE 10 G: 10 SOLUTION ORAL at 20:04

## 2018-01-01 RX ADMIN — DILTIAZEM HYDROCHLORIDE 10 MG: 5 INJECTION INTRAVENOUS at 11:41

## 2018-01-01 RX ADMIN — HYDROCODONE BITARTRATE AND ACETAMINOPHEN 1 TABLET: 5; 325 TABLET ORAL at 13:24

## 2018-01-01 RX ADMIN — INSULIN ASPART 2 UNITS: 100 INJECTION, SOLUTION INTRAVENOUS; SUBCUTANEOUS at 22:19

## 2018-01-01 RX ADMIN — DIGOXIN 125 MCG: 125 TABLET ORAL at 12:16

## 2018-01-01 RX ADMIN — CEFTRIAXONE 1 G: 1 INJECTION, POWDER, FOR SOLUTION INTRAMUSCULAR; INTRAVENOUS at 19:39

## 2018-01-01 RX ADMIN — LACTULOSE 10 G: 10 SOLUTION ORAL at 20:52

## 2018-01-01 RX ADMIN — SODIUM CHLORIDE 125 ML/HR: 900 INJECTION, SOLUTION INTRAVENOUS at 15:34

## 2018-01-01 RX ADMIN — CEFTRIAXONE SODIUM 1 G: 1 INJECTION, POWDER, FOR SOLUTION INTRAMUSCULAR; INTRAVENOUS at 19:56

## 2018-01-01 RX ADMIN — HYDROCODONE BITARTRATE AND ACETAMINOPHEN 1 TABLET: 5; 325 TABLET ORAL at 06:21

## 2018-01-01 RX ADMIN — CEFTRIAXONE SODIUM 1 G: 1 INJECTION, POWDER, FOR SOLUTION INTRAMUSCULAR; INTRAVENOUS at 20:04

## 2018-01-01 RX ADMIN — HYDROCODONE BITARTRATE AND ACETAMINOPHEN 1 TABLET: 5; 325 TABLET ORAL at 15:11

## 2018-01-01 RX ADMIN — HYDROCODONE BITARTRATE AND ACETAMINOPHEN 1 TABLET: 5; 325 TABLET ORAL at 22:48

## 2018-01-01 RX ADMIN — PAROXETINE 10 MG: 10 TABLET, FILM COATED ORAL at 05:24

## 2018-01-01 RX ADMIN — LACTULOSE 10 G: 10 SOLUTION ORAL at 21:39

## 2018-01-01 RX ADMIN — CEFTRIAXONE SODIUM 1 G: 1 INJECTION, POWDER, FOR SOLUTION INTRAMUSCULAR; INTRAVENOUS at 21:27

## 2018-01-01 RX ADMIN — LACTULOSE 10 G: 10 SOLUTION ORAL at 15:57

## 2018-01-01 RX ADMIN — FUROSEMIDE 40 MG: 40 TABLET ORAL at 13:21

## 2018-01-01 RX ADMIN — DIPHENHYDRAMINE HYDROCHLORIDE 50 MG: 50 CAPSULE ORAL at 04:47

## 2018-01-01 RX ADMIN — HYDROCODONE BITARTRATE AND ACETAMINOPHEN 1 TABLET: 5; 325 TABLET ORAL at 21:59

## 2018-01-01 RX ADMIN — CEFTRIAXONE 2 G: 2 INJECTION, POWDER, FOR SOLUTION INTRAMUSCULAR; INTRAVENOUS at 23:36

## 2018-01-01 RX ADMIN — FUROSEMIDE 20 MG: 10 INJECTION, SOLUTION INTRAMUSCULAR; INTRAVENOUS at 08:33

## 2018-01-01 RX ADMIN — BUSPIRONE HYDROCHLORIDE 5 MG: 10 TABLET ORAL at 08:10

## 2018-01-01 RX ADMIN — ALBUMIN HUMAN 12.5 G: 0.25 SOLUTION INTRAVENOUS at 22:16

## 2018-01-01 RX ADMIN — ALBUMIN HUMAN 50 G: 0.25 SOLUTION INTRAVENOUS at 18:18

## 2018-01-01 RX ADMIN — INSULIN ASPART 4 UNITS: 100 INJECTION, SOLUTION INTRAVENOUS; SUBCUTANEOUS at 11:55

## 2018-01-01 RX ADMIN — HYDROCODONE BITARTRATE AND ACETAMINOPHEN 1 TABLET: 5; 325 TABLET ORAL at 17:36

## 2018-01-01 RX ADMIN — FAMOTIDINE 20 MG: 20 TABLET, FILM COATED ORAL at 06:06

## 2018-01-01 RX ADMIN — DIGOXIN 125 MCG: 125 TABLET ORAL at 10:52

## 2018-01-01 RX ADMIN — LACTULOSE 10 G: 10 SOLUTION ORAL at 20:10

## 2018-01-01 RX ADMIN — BUSPIRONE HYDROCHLORIDE 5 MG: 10 TABLET ORAL at 21:44

## 2018-01-01 RX ADMIN — FAMOTIDINE 20 MG: 20 TABLET, FILM COATED ORAL at 06:17

## 2018-01-01 RX ADMIN — SODIUM CHLORIDE 50 ML/HR: 9 INJECTION, SOLUTION INTRAVENOUS at 00:00

## 2018-01-01 RX ADMIN — SODIUM CHLORIDE 50 ML/HR: 9 INJECTION, SOLUTION INTRAVENOUS at 19:56

## 2018-01-01 RX ADMIN — LACTULOSE 10 G: 10 SOLUTION ORAL at 20:55

## 2018-01-01 RX ADMIN — FAMOTIDINE 20 MG: 20 TABLET, FILM COATED ORAL at 05:24

## 2018-01-01 RX ADMIN — RISPERIDONE 3 MG: 1 TABLET ORAL at 20:04

## 2018-01-01 RX ADMIN — ALBUMIN HUMAN 25 G: 0.25 SOLUTION INTRAVENOUS at 11:55

## 2018-01-01 RX ADMIN — RISPERIDONE 3 MG: 1 TABLET ORAL at 20:52

## 2018-01-01 RX ADMIN — FLUTICASONE PROPIONATE 2 SPRAY: 50 SPRAY, METERED NASAL at 08:06

## 2018-01-01 RX ADMIN — HYDROCODONE BITARTRATE AND ACETAMINOPHEN 1 TABLET: 5; 325 TABLET ORAL at 14:33

## 2018-01-01 RX ADMIN — CEFTRIAXONE 2 G: 2 INJECTION, POWDER, FOR SOLUTION INTRAMUSCULAR; INTRAVENOUS at 23:45

## 2018-01-01 RX ADMIN — PAROXETINE 10 MG: 10 TABLET, FILM COATED ORAL at 06:06

## 2018-01-01 RX ADMIN — SODIUM CHLORIDE 500 ML: 9 INJECTION, SOLUTION INTRAVENOUS at 14:48

## 2018-01-01 RX ADMIN — FAMOTIDINE 20 MG: 20 TABLET ORAL at 13:20

## 2018-01-01 RX ADMIN — LACTULOSE 10 G: 10 SOLUTION ORAL at 21:44

## 2018-01-01 RX ADMIN — HYDROCODONE BITARTRATE AND ACETAMINOPHEN 1 TABLET: 5; 325 TABLET ORAL at 19:28

## 2018-01-01 RX ADMIN — FLUTICASONE PROPIONATE 2 SPRAY: 50 SPRAY, METERED NASAL at 09:59

## 2018-01-01 RX ADMIN — BUSPIRONE HYDROCHLORIDE 5 MG: 10 TABLET ORAL at 08:06

## 2018-01-01 RX ADMIN — HYDROCODONE BITARTRATE AND ACETAMINOPHEN 1 TABLET: 5; 325 TABLET ORAL at 21:49

## 2018-01-01 RX ADMIN — DIGOXIN 125 MCG: 125 TABLET ORAL at 12:28

## 2018-01-01 RX ADMIN — FLUTICASONE PROPIONATE 2 SPRAY: 50 SPRAY, METERED NASAL at 08:17

## 2018-01-01 RX ADMIN — SODIUM CHLORIDE 50 ML/HR: 9 INJECTION, SOLUTION INTRAVENOUS at 03:59

## 2018-01-01 RX ADMIN — PREDNISONE 50 MG: 10 TABLET ORAL at 23:25

## 2018-01-01 RX ADMIN — BUSPIRONE HYDROCHLORIDE 5 MG: 10 TABLET ORAL at 17:00

## 2018-01-01 RX ADMIN — RISPERIDONE 3 MG: 1 TABLET ORAL at 20:10

## 2018-01-01 RX ADMIN — ALBUMIN HUMAN 25 G: 0.25 SOLUTION INTRAVENOUS at 18:44

## 2018-01-01 RX ADMIN — PAROXETINE 10 MG: 10 TABLET, FILM COATED ORAL at 06:15

## 2018-01-01 RX ADMIN — Medication 10 ML: at 11:42

## 2018-01-01 RX ADMIN — DILTIAZEM HYDROCHLORIDE 120 MG: 120 CAPSULE, COATED, EXTENDED RELEASE ORAL at 09:59

## 2018-01-01 RX ADMIN — DILTIAZEM HYDROCHLORIDE 120 MG: 120 CAPSULE, COATED, EXTENDED RELEASE ORAL at 13:21

## 2018-01-01 RX ADMIN — HYDROCODONE BITARTRATE AND ACETAMINOPHEN 1 TABLET: 5; 325 TABLET ORAL at 04:47

## 2018-01-01 RX ADMIN — BUSPIRONE HYDROCHLORIDE 5 MG: 5 TABLET ORAL at 20:55

## 2018-01-01 RX ADMIN — RISPERIDONE 3 MG: 1 TABLET ORAL at 22:48

## 2018-01-01 RX ADMIN — PREDNISONE 50 MG: 10 TABLET ORAL at 17:36

## 2018-01-01 RX ADMIN — PAROXETINE 10 MG: 10 TABLET, FILM COATED ORAL at 07:09

## 2018-01-01 RX ADMIN — INSULIN ASPART 2 UNITS: 100 INJECTION, SOLUTION INTRAVENOUS; SUBCUTANEOUS at 20:55

## 2018-01-01 RX ADMIN — Medication 10 ML: at 14:48

## 2018-01-01 RX ADMIN — BUSPIRONE HYDROCHLORIDE 5 MG: 5 TABLET ORAL at 16:12

## 2018-01-01 RX ADMIN — CEFTRIAXONE SODIUM 1 G: 1 INJECTION, POWDER, FOR SOLUTION INTRAMUSCULAR; INTRAVENOUS at 21:44

## 2018-01-01 RX ADMIN — BUSPIRONE HYDROCHLORIDE 5 MG: 10 TABLET ORAL at 21:38

## 2018-01-01 RX ADMIN — BUSPIRONE HYDROCHLORIDE 5 MG: 10 TABLET ORAL at 14:38

## 2018-01-01 RX ADMIN — BUSPIRONE HYDROCHLORIDE 5 MG: 10 TABLET ORAL at 20:04

## 2018-01-01 RX ADMIN — HYDROCODONE BITARTRATE AND ACETAMINOPHEN 1 TABLET: 5; 325 TABLET ORAL at 11:38

## 2018-01-01 RX ADMIN — LACTULOSE 10 G: 10 SOLUTION ORAL at 08:32

## 2018-01-01 RX ADMIN — BUSPIRONE HYDROCHLORIDE 5 MG: 5 TABLET ORAL at 22:48

## 2018-01-01 RX ADMIN — SPIRONOLACTONE 50 MG: 50 TABLET, FILM COATED ORAL at 08:33

## 2018-01-01 RX ADMIN — DILTIAZEM HYDROCHLORIDE 120 MG: 120 CAPSULE, COATED, EXTENDED RELEASE ORAL at 08:17

## 2018-01-01 RX ADMIN — SODIUM CHLORIDE 50 ML/HR: 9 INJECTION, SOLUTION INTRAVENOUS at 21:38

## 2018-01-01 RX ADMIN — FAMOTIDINE 20 MG: 20 TABLET, FILM COATED ORAL at 06:15

## 2018-01-01 RX ADMIN — FUROSEMIDE 20 MG: 10 INJECTION, SOLUTION INTRAMUSCULAR; INTRAVENOUS at 16:57

## 2018-01-01 RX ADMIN — DIGOXIN 125 MCG: 125 TABLET ORAL at 14:38

## 2018-01-01 RX ADMIN — BUSPIRONE HYDROCHLORIDE 5 MG: 10 TABLET ORAL at 15:30

## 2018-01-01 RX ADMIN — BUSPIRONE HYDROCHLORIDE 5 MG: 10 TABLET ORAL at 20:52

## 2018-01-01 RX ADMIN — HYDROCODONE BITARTRATE AND ACETAMINOPHEN 1 TABLET: 5; 325 TABLET ORAL at 09:48

## 2018-01-01 RX ADMIN — BUSPIRONE HYDROCHLORIDE 5 MG: 5 TABLET ORAL at 15:57

## 2018-01-01 RX ADMIN — LACTULOSE 10 G: 10 SOLUTION ORAL at 22:47

## 2018-01-01 RX ADMIN — DILTIAZEM HYDROCHLORIDE 120 MG: 120 CAPSULE, COATED, EXTENDED RELEASE ORAL at 08:33

## 2018-01-01 RX ADMIN — PREDNISONE 50 MG: 10 TABLET ORAL at 04:47

## 2018-01-01 RX ADMIN — RISPERIDONE 3 MG: 1 TABLET ORAL at 21:44

## 2018-01-01 RX ADMIN — SPIRONOLACTONE 50 MG: 50 TABLET, FILM COATED ORAL at 16:12

## 2018-01-01 RX ADMIN — DILTIAZEM HYDROCHLORIDE 120 MG: 120 CAPSULE, COATED, EXTENDED RELEASE ORAL at 08:06

## 2018-01-01 RX ADMIN — CEFTRIAXONE SODIUM 1 G: 1 INJECTION, POWDER, FOR SOLUTION INTRAMUSCULAR; INTRAVENOUS at 21:58

## 2018-01-01 RX ADMIN — RISPERIDONE 3 MG: 1 TABLET ORAL at 20:55

## 2018-01-01 RX ADMIN — HYDROCODONE BITARTRATE AND ACETAMINOPHEN 1 TABLET: 5; 325 TABLET ORAL at 14:25

## 2018-01-01 RX ADMIN — ACETAMINOPHEN 500 MG: 500 TABLET ORAL at 17:10

## 2018-01-01 RX ADMIN — HYDROCODONE BITARTRATE AND ACETAMINOPHEN 1 TABLET: 5; 325 TABLET ORAL at 09:10

## 2018-01-01 RX ADMIN — Medication 10 ML: at 19:56

## 2018-01-01 RX ADMIN — BUSPIRONE HYDROCHLORIDE 5 MG: 5 TABLET ORAL at 08:33

## 2018-01-01 RX ADMIN — DIGOXIN 125 MCG: 125 TABLET ORAL at 12:20

## 2018-01-01 RX ADMIN — BUSPIRONE HYDROCHLORIDE 5 MG: 10 TABLET ORAL at 20:10

## 2018-07-10 PROBLEM — R18.8 OTHER ASCITES: Status: ACTIVE | Noted: 2018-01-01

## 2018-07-10 NOTE — PATIENT INSTRUCTIONS
MyPlate from [employer]  The general, healthful diet is based on the 2010 Dietary Guidelines for Americans. The amount of food you need to eat from each food group depends on your age, sex, and level of physical activity and can be individualized by a dietitian. Go to ChooseMyPlate.gov for more information.  What do I need to know about the MyPlate plan?  · Enjoy your food, but eat less.  · Avoid oversized portions.  ? ½ of your plate should include fruits and vegetables.  ? ¼ of your plate should be grains.  ? ¼ of your plate should be protein.  Grains  · Make at least half of your grains whole grains.  · For a 2,000 calorie daily food plan, eat 6 oz every day.  · 1 oz is about 1 slice bread, 1 cup cereal, or ½ cup cooked rice, cereal, or pasta.  Vegetables  · Make half your plate fruits and vegetables.  · For a 2,000 calorie daily food plan, eat 2½ cups every day.  · 1 cup is about 1 cup raw or cooked vegetables or vegetable juice or 2 cups raw leafy greens.  Fruits  · Make half your plate fruits and vegetables.  · For a 2,000 calorie daily food plan, eat 2 cups every day.  · 1 cup is about 1 cup fruit or 100% fruit juice or ½ cup dried fruit.  Protein  · For a 2,000 calorie daily food plan, eat 5½ oz every day.  · 1 oz is about 1 oz meat, poultry, or fish, ¼ cup cooked beans, 1 egg, 1 Tbsp peanut butter, or ½ oz nuts or seeds.  Dairy  · Switch to fat-free or low-fat (1%) milk.  · For a 2,000 calorie daily food plan, eat 3 cups every day.  · 1 cup is about 1 cup milk or yogurt or soy milk (soy beverage), 1½ oz natural cheese, or 2 oz processed cheese.  Fats, Oils, and Empty Calories  · Only small amounts of oils are recommended.  · Empty calories are calories from solid fats or added sugars.  · Compare sodium in foods like soup, bread, and frozen meals. Choose the foods with lower numbers.  · Drink water instead of sugary drinks.  What foods can I eat?  Grains  Whole grains such as whole wheat, quinoa, millet, and  bulgur. Bread, rolls, and pasta made from whole grains. Brown or wild rice. Hot or cold cereals made from whole grains and without added sugar.  Vegetables  All fresh vegetables, especially fresh red, dark green, or orange vegetables. Peas and beans. Low-sodium frozen or canned vegetables prepared without added salt. Low-sodium vegetable juices.  Fruits  All fresh, frozen, and dried fruits. Canned fruit packed in water or fruit juice without added sugar. Fruit juices without added sugar.  Meats and Other Protein Sources  Boiled, baked, or grilled lean meat trimmed of fat. Skinless poultry. Fresh seafood and shellfish. Canned seafood packed in water. Unsalted nuts and unsalted nut butters. Tofu. Dried beans and pea. Eggs.  Dairy  Low-fat or fat-free milk, yogurt, and cheeses.  Sweets and Desserts  Frozen desserts made from low-fat milk.  Fats and Oils  Olive, peanut, and canola oils and margarine. Salad dressing and mayonnaise made from these oils.  Other  Soups and casseroles made from allowed ingredients and without added fat or salt.  The items listed above may not be a complete list of recommended foods or beverages. Contact your dietitian for more options.  What foods are not recommended?  Grains  Sweetened, low-fiber cereals. Packaged baked goods. Snack crackers and chips. Cheese crackers, butter crackers, and biscuits. Frozen waffles, sweet breads, doughnuts, pastries, packaged baking mixes, pancakes, cakes, and cookies.  Vegetables  Regular canned or frozen vegetables or vegetables prepared with salt. Canned tomatoes. Canned tomato sauce. Fried vegetables. Vegetables in cream sauce or cheese sauce.  Fruits  Fruits packed in syrup or made with added sugar.  Meats and Other Protein Sources  Marbled or fatty meats such as ribs. Poultry with skin. Fried meats, poultry, eggs, or fish. Sausages, hot dogs, and deli meats such as pastrami, bologna, or salami.  Dairy  Whole milk, cream, cheeses made from whole milk,  sour cream. Ice cream or yogurt made from whole milk or with added sugar.  Beverages  For adults, no more than one alcoholic drink per day. Regular soft drinks or other sugary beverages. Juice drinks.  Sweets and Desserts  Sugary or fatty desserts, candy, and other sweets.  Fats and Oils  Solid shortening or partially hydrogenated oils. Solid margarine. Margarine that contains trans fats. Butter.  The items listed above may not be a complete list of foods and beverages to avoid. Contact your dietitian for more information.  This information is not intended to replace advice given to you by your health care provider. Make sure you discuss any questions you have with your health care provider.  Document Released: 01/06/2009 Document Revised: 05/25/2017 Document Reviewed: 11/26/2014  "[employer] Interactive Patient Education © 2018 "[employer] Inc.  BMI for Adults  Body mass index (BMI) is a number that is calculated from a person's weight and height. In most adults, the number is used to find how much of an adult's weight is made up of fat. BMI is not as accurate as a direct measure of body fat.  How is BMI calculated?  BMI is calculated by dividing weight in kilograms by height in meters squared. It can also be calculated by dividing weight in pounds by height in inches squared, then multiplying the resulting number by 703. Charts are available to help you find your BMI quickly and easily without doing this calculation.  How is BMI interpreted?  Health care professionals use BMI charts to identify whether an adult is underweight, at a normal weight, or overweight based on the following guidelines:  · Underweight: BMI less than 18.5.  · Normal weight: BMI between 18.5 and 24.9.  · Overweight: BMI between 25 and 29.9.  · Obese: BMI of 30 and above.    BMI is usually interpreted the same for males and females.  Weight includes both fat and muscle, so someone with a muscular build, such as an athlete, may have a BMI that is  higher than 24.9. In cases like these, BMI may not accurately depict body fat. To determine if excess body fat is the cause of a BMI of 25 or higher, further assessments may need to be done by a health care provider.  Why is BMI a useful tool?  BMI is used to identify a possible weight problem that may be related to a medical problem or may increase the risk for medical problems. BMI can also be used to promote changes to reach a healthy weight.  This information is not intended to replace advice given to you by your health care provider. Make sure you discuss any questions you have with your health care provider.  Document Released: 08/29/2005 Document Revised: 04/27/2017 Document Reviewed: 05/15/2015  [employer] Interactive Patient Education © 2018 [employer]

## 2018-07-10 NOTE — PROGRESS NOTES
[facility]      Chief Complaint:   Chief Complaint   Patient presents with   • Ascites       Subjective     HPI:   Patient with worsening ascites.  Patient is having difficulty walking because of ascites with edema of the legs.  Cause of patient's liver disease unknown and patient did not go for CT guided liver biopsy.  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  All the other lab work is essentially normal.  Patient would like ultrasound guided paracentesis to improve his symptoms.  Discussed with patient that there is no treatment for sclerosing cholangitis but that we can control the symptoms including paracentesis discussed with the patient decreasing salt intake.  We'll increase patient's diuretics.    Plan; we'll increase Aldactone 50 mg daily.  While patient follow up in 2 weeks after Guided Paracentesis See If Any Improvement in Symptoms and to See If Ascites Is Reaccumulating.  If This Acute Accumulation Will Increase the Dose Again.    Past Medical History:   Past Medical History:   Diagnosis Date   • GERD (gastroesophageal reflux disease)    • History of echocardiogram 05/04/2015    Echocardiogram W/ color flow 47794 (Low normal LV funciton with Ef of 50-55%. Normal RV size and funciton. Grade 1A diastolic dysfunction. No indication of left ventricular or left atrial thrombus. No sig. valvular regurg or stenosis.)   • Hyperlipidemia    • Hypertension        Family History:  Family History   Problem Relation Age of Onset   • Heart attack Other    • Heart disease Other    • Stroke Other    • Hypertension Other    • Diabetes Other    • Lung cancer Other        Social History:   reports that he has never smoked. His smokeless tobacco use includes Chew. He reports that he drinks alcohol. He reports that he does not use drugs.    Medications:   Prior to Admission medications    Medication Sig Start Date End Date Taking? Authorizing Provider   aspirin 325 MG  tablet Take 325 mg by mouth Daily. 6/18/15   Historical Provider, MD   atorvastatin (LIPITOR) 40 MG tablet Take 20 mg by mouth Daily With Dinner. 6/18/15   Historical Provider, MD   diazePAM (VALIUM) 5 MG tablet Take 5 mg by mouth 2 (Two) Times a Day. 5/25/16   Historical Provider, MD   diltiaZEM CD (CARDIZEM CD) 120 MG 24 hr capsule Take 120 mg by mouth Daily. Every Morning Before Meal For Heart Or To Lower Blood Pressure    Historical Provider, MD   fluticasone (FLONASE) 50 MCG/ACT nasal spray 2 sprays into each nostril Daily.    Historical Provider, MD   furosemide (LASIX) 40 MG tablet Take 40 mg by mouth Daily. In A.M.    Historical Provider, MD   HYDROcodone-acetaminophen (NORCO)  MG per tablet Take 1 tablet by mouth Every 4 (Four) Hours As Needed for moderate pain (4-6). 5/3/15   Historical Provider, MD   lisinopril (PRINIVIL,ZESTRIL) 5 MG tablet Take 2.5 mg by mouth Daily. 6/18/15   Historical Provider, MD   omeprazole (priLOSEC) 20 MG capsule Take 20 mg by mouth Every Morning Before Breakfast. 6/18/15   Historical Provider, MD   risperiDONE (risperDAL) 3 MG tablet Take 3 mg by mouth Every Night. 6/18/15   Historical Provider, MD   spironolactone (ALDACTONE) 25 MG tablet Take 2 tablets by mouth Daily. 7/10/18   Carl Klein MD   spironolactone (ALDACTONE) 25 MG tablet Take 25 mg by mouth Daily.  7/10/18  Historical Provider, MD       Allergies:  Codeine; Iodine; Morphine and related; and Penicillins    ROS:    Review of Systems   Constitutional: Negative for activity change, appetite change, chills, diaphoresis, fatigue, fever and unexpected weight change.   HENT: Negative for sore throat and trouble swallowing.    Respiratory: Negative for shortness of breath.    Gastrointestinal: Positive for abdominal distention. Negative for abdominal pain, anal bleeding, blood in stool, constipation, diarrhea, nausea, rectal pain and vomiting.   Musculoskeletal: Negative for arthralgias.   Skin: Negative for  "pallor.   Neurological: Negative for light-headedness.     Objective     Blood pressure 116/70, pulse 58, height 172.7 cm (68\"), SpO2 93 %.    Physical Exam   Constitutional: He is oriented to person, place, and time. He appears well-developed and well-nourished. No distress.   HENT:   Head: Normocephalic and atraumatic.   Cardiovascular: Normal rate, regular rhythm, normal heart sounds and intact distal pulses.  Exam reveals no gallop and no friction rub.    No murmur heard.  Pulmonary/Chest: Breath sounds normal. No respiratory distress. He has no wheezes. He has no rales. He exhibits no tenderness.   Abdominal: Bowel sounds are normal. He exhibits distension. He exhibits no mass. There is no tenderness. There is no rebound and no guarding. No hernia.   Patient with large amount of ascites.   Musculoskeletal: Normal range of motion. He exhibits no edema.   Neurological: He is alert and oriented to person, place, and time.   Skin: Skin is warm and dry. No rash noted. He is not diaphoretic. No erythema. No pallor.   Psychiatric: He has a normal mood and affect. His behavior is normal. Judgment and thought content normal.        Assessment/Plan   Rodriguez was seen today for ascites.    Diagnoses and all orders for this visit:    Other ascites  -     US Abdomen Complete; Future    Other orders  -     spironolactone (ALDACTONE) 25 MG tablet; Take 2 tablets by mouth Daily.        * Surgery not found *     Diagnosis Plan   1. Other ascites  US Abdomen Complete       Anticipated Surgical Procedure:  Orders Placed This Encounter   Procedures   • US Abdomen Complete     Standing Status:   Future     Standing Expiration Date:   7/10/2019     Scheduling Instructions:      paracentesis     Order Specific Question:   Reason for Exam:     Answer:   ascities       The risks, benefits, and alternatives of this procedure have been discussed with the patient or the responsible party- the patient understands and agrees to " proceed.

## 2018-07-11 PROBLEM — I48.91 ATRIAL FIBRILLATION (HCC): Status: ACTIVE | Noted: 2018-01-01

## 2018-07-11 PROBLEM — I10 HYPERTENSION: Chronic | Status: ACTIVE | Noted: 2018-01-01

## 2018-07-11 PROBLEM — E78.5 HYPERLIPIDEMIA: Chronic | Status: ACTIVE | Noted: 2018-01-01

## 2018-07-11 PROBLEM — K21.9 GERD (GASTROESOPHAGEAL REFLUX DISEASE): Chronic | Status: ACTIVE | Noted: 2018-01-01

## 2018-07-11 NOTE — PLAN OF CARE
Problem: Patient Care Overview  Goal: Plan of Care Review  Outcome: Ongoing (interventions implemented as appropriate)   07/11/18 3840   Coping/Psychosocial   Plan of Care Reviewed With patient   Plan of Care Review   Progress no change   OTHER   Outcome Summary new admission to [facility]

## 2018-07-11 NOTE — H&P
HISTORY AND PHYSICAL  NAME: Rodriguez Botello Jr.  : 1934  MRN: [id number]    DATE OF ADMISSION: 18    DATE & TIME SEEN: 18 5:03 PM    PCP: Carl Hanks MD    CODE STATUS:   Code Status and Medical Interventions:   Ordered at: 18     Level Of Support Discussed With:    Patient     Code Status:    CPR     Medical Interventions (Level of Support Prior to Arrest):    Full     CHIEF COMPLAINT Abdominal pain    HPI:  Rodriguez Botello Jr. is a 84 y.o. male who presents with abdominal pain from [facility]..    Patient with known cirrhosis and ascites has had worsening ascites and abdominal pain.  As such he came to the ED today.  Patient denies any fever, chills, nausea, or vomiting today. Patient does endorse some anorexia with the increase in abdominal pain.     CONCURRENT MEDICAL HISTORY:  Past Medical History:   Diagnosis Date   • GERD (gastroesophageal reflux disease)    • History of echocardiogram 2015    Echocardiogram W/ color flow 08820 (Low normal LV funciton with Ef of 50-55%. Normal RV size and funciton. Grade 1A diastolic dysfunction. No indication of left ventricular or left atrial thrombus. No sig. valvular regurg or stenosis.)   • Hyperlipidemia    • Hypertension        PAST SURGICAL HISTORY:  Past Surgical History:   Procedure Laterality Date   • BACK SURGERY      Approximatly 20 Years Prior To Todays Clinical Appointment Dated 2017 [facility] [place] [facility]       FAMILY HISTORY:  Family History   Problem Relation Age of Onset   • Heart attack Other    • Heart disease Other    • Stroke Other    • Hypertension Other    • Diabetes Other    • Lung cancer Other         SOCIAL HISTORY:  Social History     Social History   • Marital status:      Spouse name: N/A   • Number of children: N/A   • Years of education: N/A     Occupational History   • Not on file.     Social History Main Topics   • Smoking status: Never Smoker   • Smokeless tobacco: Current  "User     Types: Chew   • Alcohol use Yes      Comment: \"Very Little\"   • Drug use: No   • Sexual activity: Defer     Other Topics Concern   • Not on file     Social History Narrative   • No narrative on file       HOME MEDICATIONS:  Prior to Admission medications    Medication Sig Start Date End Date Taking? Authorizing Provider   acetaminophen (TYLENOL) 500 MG tablet Take 500 mg by mouth Every 4 (Four) Hours As Needed for Mild Pain  or Fever (VAS 1-4).    Historical Provider, MD   aspirin 325 MG tablet Take 325 mg by mouth Daily. 6/18/15   Historical Provider, MD   atorvastatin (LIPITOR) 40 MG tablet Take 40 mg by mouth Daily With Dinner. 6/18/15   Historical Provider, MD   busPIRone (BUSPAR) 5 MG tablet Take 5 mg by mouth 3 (Three) Times a Day.    Historical Provider, MD   diltiaZEM CD (CARDIZEM CD) 120 MG 24 hr capsule Take 120 mg by mouth Daily. Every Morning Before Meal For Heart Or To Lower Blood Pressure    Historical Provider, MD   famotidine (PEPCID) 20 MG tablet Take 20 mg by mouth Every Morning.    Historical Provider, MD   fluticasone (FLONASE) 50 MCG/ACT nasal spray 2 sprays into each nostril Daily.    Historical Provider, MD   furosemide (LASIX) 40 MG tablet Take 40 mg by mouth Every Morning.    Historical Provider, MD   HYDROcodone-acetaminophen (NORCO) 5-325 MG per tablet Take 1 tablet by mouth Every 8 (Eight) Hours As Needed for Moderate Pain  (VAS 5-10).    Historical Provider, MD   PARoxetine (PAXIL) 10 MG tablet Take 10 mg by mouth Every Morning.    Historical Provider, MD   polyethylene glycol (MIRALAX) packet Take 17 g by mouth Daily.    Historical Provider, MD   potassium chloride (K-DUR) 10 MEQ CR tablet Take 10 mEq by mouth Every Morning.    Historical Provider, MD   risperiDONE (risperDAL) 3 MG tablet Take 3 mg by mouth Every Night. 6/18/15   Historical Provider, MD   diazePAM (VALIUM) 5 MG tablet Take 5 mg by mouth 2 (Two) Times a Day. 5/25/16 7/11/18  Historical Provider, MD "   HYDROcodone-acetaminophen (NORCO)  MG per tablet Take 1 tablet by mouth Every 4 (Four) Hours As Needed for moderate pain (4-6). 5/3/15 7/11/18  Historical Provider, MD   lisinopril (PRINIVIL,ZESTRIL) 5 MG tablet Take 2.5 mg by mouth Daily. 6/18/15 7/11/18  Historical Provider, MD   omeprazole (priLOSEC) 20 MG capsule Take 20 mg by mouth Every Morning Before Breakfast. 6/18/15 7/11/18  Historical Provider, MD   spironolactone (ALDACTONE) 25 MG tablet Take 2 tablets by mouth Daily. 7/10/18 7/11/18  Carl Klein MD       ALLERGIES:  Codeine; Iodine; Morphine and related; and Penicillins    REVIEW OF SYSTEMS  Review of Systems   Constitutional: Positive for activity change and fatigue. Negative for appetite change and fever.   HENT: Negative for ear pain and sore throat.    Eyes: Negative for pain and visual disturbance.   Respiratory: Negative for cough and shortness of breath.    Cardiovascular: Negative for chest pain and palpitations.   Gastrointestinal: Positive for abdominal distention, abdominal pain and nausea. Negative for constipation, diarrhea and vomiting.   Endocrine: Negative for cold intolerance and heat intolerance.   Genitourinary: Negative for difficulty urinating and dysuria.   Musculoskeletal: Negative for arthralgias and gait problem.   Skin: Negative for color change and rash.   Neurological: Positive for weakness. Negative for dizziness and headaches.   Hematological: Negative for adenopathy. Does not bruise/bleed easily.   Psychiatric/Behavioral: Negative for agitation, confusion and sleep disturbance.       PHYSICAL EXAM:  Temp:  [98.7 °F (37.1 °C)] 98.7 °F (37.1 °C)  Heart Rate:  [114-129] 114  Resp:  [20] 20  BP: (116-130)/(74-79) 130/77  Body mass index is 27.51 kg/m².     Physical Exam   Constitutional: He is oriented to person, place, and time. He appears well-developed and well-nourished. No distress.   HENT:   Head: Normocephalic and atraumatic.   Right Ear: External ear  normal.   Left Ear: External ear normal.   Nose: Nose normal.   Eyes: Conjunctivae and EOM are normal. Pupils are equal, round, and reactive to light. No scleral icterus.   Neck: Normal range of motion. Neck supple.   Cardiovascular: Normal rate and regular rhythm.    Pulmonary/Chest: Effort normal.   Diminished bibasilar.   Abdominal: Bowel sounds are normal. He exhibits distension. He exhibits no mass. There is tenderness. There is no rebound and no guarding.   Musculoskeletal: He exhibits edema (Trace). He exhibits no tenderness.   Neurological: He is alert and oriented to person, place, and time.   Skin: Skin is warm and dry. He is not diaphoretic.   Psychiatric: He has a normal mood and affect. His behavior is normal.   Nursing note and vitals reviewed.      DIAGNOSTIC DATA:   Lab Results (last 24 hours)     Procedure Component Value Units Date/Time    Troponin [[id number]]  (Normal) Collected:  07/11/18 1619    Specimen:  Blood Updated:  07/11/18 1658     Troponin I <0.012 ng/mL     TSH+Free T4 [[id number]]  (Normal) Collected:  07/11/18 1441    Specimen:  Blood Updated:  07/11/18 1634     TSH 3.010 mIU/mL      Free T4 1.58 ng/dL     Urinalysis With Microscopic If Indicated (No Culture) - Urine, Clean Catch [[id number]]  (Abnormal) Collected:  07/11/18 1555    Specimen:  Urine from Urine, Clean Catch Updated:  07/11/18 1609     Color, UA Yellow     Appearance, UA Clear     pH, UA 6.0     Specific Gravity, UA 1.012     Glucose, UA Negative     Ketones, UA Negative     Bilirubin, UA Negative     Blood, UA Negative     Protein, UA Negative     Leuk Esterase, UA Negative     Nitrite, UA Negative     Urobilinogen, UA 2.0 E.U./dL (A)    Narrative:       Urine microscopic not indicated.    D-dimer, Quantitative [[id number]]  (Abnormal) Collected:  07/11/18 1441    Specimen:  Blood Updated:  07/11/18 1548     D-Dimer, Quantitative >4,000 (H) ng/mL (FEU)     Narrative:       Dimer values <500 ng/ml FEU are FDA approved as  aid in diagnosis of deep venous thrombosis and pulmonary embolism.  This test should not be used in an exclusion strategy with pretest probability alone.    A recent guideline regarding diagnosis for pulmonary thromboembolism recommends an adjusted exclusion criterion of age x 10 ng/ml FEU for patients >50 years of age (Kaitlynn Intern Med 2015; 163: 701-711).    Protime-INR [[id number]]  (Abnormal) Collected:  07/11/18 1441    Specimen:  Blood Updated:  07/11/18 1548     Protime 15.8 (H) Seconds      INR 1.30 (H)    Narrative:       Therapeutic range for most indications is 2.0-3.0 INR,  or 2.5-3.5 for mechanical heart valves.    aPTT [[id number]]  (Normal) Collected:  07/11/18 1441    Specimen:  Blood Updated:  07/11/18 1548     PTT 30.9 seconds     Narrative:       The recommended Heparin therapeutic range is 68-97 seconds.    Extra Tubes [[id number]] Collected:  07/11/18 1441    Specimen:  Blood from Blood, Venous Line Updated:  07/11/18 1545    Narrative:       The following orders were created for panel order Extra Tubes.  Procedure                               Abnormality         Status                     ---------                               -----------         ------                     Gold Top - SST[[id number]]                                   Final result                 Please view results for these tests on the individual orders.    Gold Top - SST [[id number]] Collected:  07/11/18 1441    Specimen:  Blood Updated:  07/11/18 1545     Extra Tube Hold for add-ons.     Comment: Auto resulted.       Lactic Acid, Plasma [[id number]]  (Abnormal) Collected:  07/11/18 1441    Specimen:  Blood Updated:  07/11/18 1533     Lactate 2.5 (C) mmol/L     Lactic Acid, Reflex Timer (This will reflex a repeat order 3-3:15 hours after ordered.) [[id number]] Collected:  07/11/18 1441    Specimen:  Blood Updated:  07/11/18 1533    CK-MB [[id number]]  (Normal) Collected:  07/11/18 1441    Specimen:  Blood Updated:  07/11/18 1533      CKMB 0.82 ng/mL     CK [[id number]]  (Abnormal) Collected:  07/11/18 1441    Specimen:  Blood Updated:  07/11/18 1524     Creatine Kinase 37 (L) U/L     Magnesium [[id number]]  (Normal) Collected:  07/11/18 1441    Specimen:  Blood Updated:  07/11/18 1524     Magnesium 2.0 mg/dL     Comprehensive Metabolic Panel [[id number]]  (Abnormal) Collected:  07/11/18 1441    Specimen:  Blood Updated:  07/11/18 1522     Glucose 172 (H) mg/dL      BUN 15 mg/dL      Creatinine 0.93 mg/dL      Sodium 140 mmol/L      Potassium 3.3 (L) mmol/L      Chloride 102 mmol/L      CO2 30.0 mmol/L      Calcium 7.9 (L) mg/dL      Total Protein 6.8 g/dL      Albumin 2.70 (L) g/dL      ALT (SGPT) 33 U/L      AST (SGOT) 41 U/L      Alkaline Phosphatase 248 (H) U/L      Total Bilirubin 1.0 mg/dL      eGFR Non African Amer 77 mL/min/1.73      Globulin 4.1 (H) gm/dL      A/G Ratio 0.7 (L) g/dL      BUN/Creatinine Ratio 16.1     Anion Gap 8.0 mmol/L     Narrative:       The MDRD GFR formula is only valid for adults with stable renal function between ages 18 and 70.    Amylase [[id number]]  (Normal) Collected:  07/11/18 1441    Specimen:  Blood Updated:  07/11/18 1522     Amylase 69 U/L     Lipase [[id number]]  (Abnormal) Collected:  07/11/18 1441    Specimen:  Blood Updated:  07/11/18 1522     Lipase 526 (H) U/L     Ammonia [[id number]]  (Normal) Collected:  07/11/18 1441    Specimen:  Blood Updated:  07/11/18 1522     Ammonia 22 umol/L     CBC & Differential [[id number]] Collected:  07/11/18 1441    Specimen:  Blood Updated:  07/11/18 1503    Narrative:       The following orders were created for panel order CBC & Differential.  Procedure                               Abnormality         Status                     ---------                               -----------         ------                     CBC Auto Differential[[id number]]        Abnormal            Final result                 Please view results for these tests on the individual orders.    CBC  Auto Differential [[id number]]  (Abnormal) Collected:  07/11/18 1441    Specimen:  Blood Updated:  07/11/18 1503     WBC 5.99 10*3/mm3      RBC 3.67 (L) 10*6/mm3      Hemoglobin 12.1 (L) g/dL      Hematocrit 35.0 (L) %      MCV 95.4 fL      MCH 33.0 pg      MCHC 34.6 g/dL      RDW 14.7 (H) %      RDW-SD 51.8 (H) fl      MPV 9.9 fL      Platelets 115 (L) 10*3/mm3      Neutrophil % 66.4 %      Lymphocyte % 15.0 %      Monocyte % 12.9 (H) %      Eosinophil % 4.3 %      Basophil % 1.2 %      Immature Grans % 0.2 %      Neutrophils, Absolute 3.98 10*3/mm3      Lymphocytes, Absolute 0.90 10*3/mm3      Monocytes, Absolute 0.77 10*3/mm3      Eosinophils, Absolute 0.26 10*3/mm3      Basophils, Absolute 0.07 10*3/mm3      Immature Grans, Absolute 0.01 10*3/mm3         Estimated Creatinine Clearance: 61.8 mL/min (by C-G formula based on SCr of 0.93 mg/dL).     Imaging Results (last 24 hours)     Procedure Component Value Units Date/Time    XR Chest 1 View [[id number]] Collected:  07/11/18 1500     Updated:  07/11/18 1522    Narrative:         PROCEDURE: Single chest view AP    REASON FOR EXAM:abd pain    FINDINGS: Comparison exam dated May 4, 2015. Cardiac and  pulmonary vasculature are normal. Lungs are clear. Pleural spaces  are normal. No acute osseous abnormality.      Impression:       No acute cardiopulmonary abnormality.    Electronically signed by:  Faisal Chu MD  7/11/2018 3:20 PM CDT  Workstation: [id number]          I reviewed the patient's new clinical results.    ASSESSMENT AND PLAN: This is a 84 y.o. male with:    Active Hospital Problems    Diagnosis Date Noted   • Atrial fibrillation (CMS/HCC) [I48.91] 07/11/2018   • Hyperlipidemia [E78.5] 07/11/2018   • GERD (gastroesophageal reflux disease) [K21.9] 07/11/2018   • Hypertension [I10] 07/11/2018   • Other ascites [R18.8] 07/10/2018   • Hepatic cirrhosis (CMS/HCC) [K74.60] 05/09/2017     1. Abdominal pain. Low suspicion for SBP. Ascitic tap per IR. PMR < 250 cell/mm3.   Patient afebrile.  Patient does have abdominal pain as such will start on rocephin 2g IV q8h as cefotaxime is non formulary.  Ankit cultures.  2. Ascites. Secondary to #2. Fluid tap done per IR. 8.5 L of yellow fluid aspirated. Fluid studies ordered.  3. Cirrhosis. Suspect advanced. GI consulted. Appreciate input.  4. Atrial fibrillation. First EKG suspicious for atrial fibrillation. Chart review shows patient spontaneously converted to NSR> Will hold off on anticoagulation secondary to ascitic tap and liver disease.  Will discuss with GI. Cardiac monitoring. Cardizem from home meds.  5. Elevated D-dimer. CTA ordered for am with pre medication with steroids. Anticoagulation on hold.    6. Deconditioning. PT/OT.    Will monitor patient's hospital course and adjust treatment as hospital course dictates.    DVT prophylaxis: SCD's  Code status is   Code Status and Medical Interventions:   Ordered at: 07/11/18 1956     Level Of Support Discussed With:    Patient     Code Status:    CPR     Medical Interventions (Level of Support Prior to Arrest):    Full      I discussed the patients findings and my recommendations with patient, family and nursing staff.           This document has been electronically signed by Miguel Sharif MD on July 11, 2018 5:03 PM

## 2018-07-11 NOTE — ED PROVIDER NOTES
Subjective   85yo male nursing home resident pmh significant htn/hyperlipidemia/dm2/cirrhosis/cva/parkinsons disease presents ED c/o 1wk hx gradual increased abdominal distension/anorexia/dyspnea secondary to abdominal girth.  Pt notably vague historian; pt denies fever/chills/chest pain/cough/dysuria/melena/hematochoezia/hematemesis.  Pt c/o abdominal 'tightness'.        Illness   Severity:  Severe  Onset quality:  Gradual  Timing:  Constant  Chronicity:  Recurrent  Associated symptoms: abdominal pain and fatigue    Associated symptoms: no nausea and no vomiting        Review of Systems   Constitutional: Positive for fatigue.   HENT: Negative.    Eyes: Negative.    Respiratory: Negative.    Cardiovascular: Negative.    Gastrointestinal: Positive for abdominal pain. Negative for blood in stool, constipation, nausea and vomiting.   Endocrine: Negative.    Genitourinary: Negative.    Musculoskeletal: Negative.    Skin: Negative.    All other systems reviewed and are negative.      Past Medical History:   Diagnosis Date   • GERD (gastroesophageal reflux disease)    • History of echocardiogram 05/04/2015    Echocardiogram W/ color flow 94332 (Low normal LV funciton with Ef of 50-55%. Normal RV size and funciton. Grade 1A diastolic dysfunction. No indication of left ventricular or left atrial thrombus. No sig. valvular regurg or stenosis.)   • Hyperlipidemia    • Hypertension        Allergies   Allergen Reactions   • Codeine    • Iodine    • Morphine And Related    • Penicillins      AND PENICILLIN G       Past Surgical History:   Procedure Laterality Date   • BACK SURGERY      Approximatly 20 Years Prior To Todays Clinical Appointment Dated 03/28/2017 [facility] [place] [facility]       Family History   Problem Relation Age of Onset   • Heart attack Other    • Heart disease Other    • Stroke Other    • Hypertension Other    • Diabetes Other    • Lung cancer Other        Social History     Social History   • Marital status:   "    Social History Main Topics   • Smoking status: Never Smoker   • Smokeless tobacco: Current User     Types: Chew   • Alcohol use Yes      Comment: \"Very Little\"   • Drug use: No   • Sexual activity: Defer     Other Topics Concern   • Not on file           Objective   Physical Exam   Constitutional: He is oriented to person, place, and time. He appears well-developed and well-nourished.   HENT:   Head: Normocephalic and atraumatic.   Mouth/Throat: Mucous membranes are dry.   Eyes: Pupils are equal, round, and reactive to light.   Neck: Neck supple. No JVD present. No tracheal deviation present.   Cardiovascular: S1 normal, S2 normal and normal heart sounds.  An irregularly irregular rhythm present. Tachycardia present.    Pulmonary/Chest: Effort normal and breath sounds normal. He has no wheezes. He has no rales.   Abdominal: Soft. Bowel sounds are normal. He exhibits distension and fluid wave. There is no tenderness. There is no rigidity, no rebound, no guarding, no CVA tenderness and negative Carmona's sign.   Genitourinary: Rectal exam shows guaiac negative stool.   Musculoskeletal: He exhibits edema.   Lymphadenopathy:     He has no cervical adenopathy.   Neurological: He is alert and oriented to person, place, and time. GCS eye subscore is 4. GCS verbal subscore is 5. GCS motor subscore is 6.   Skin: Skin is warm and dry. There is pallor.   Nursing note and vitals reviewed.      ECG 12 Lead    Date/Time: 7/11/2018 3:05 PM  Performed by: KANG DOMINGUEZ  Authorized by: KANG DOMINGUEZ   Interpreted by physician  Rhythm: atrial fibrillation  Rate: tachycardic  BPM: 119  QRS axis: left  ST Segments: ST segments normal  T Waves: T waves normal  Other findings: PRWP  Clinical impression: abnormal ECG and dysrhythmia - atrial                 ED Course  ED Course as of Jul 11 1616 Wed Jul 11, 2018   1605 Dr. Gill/Dr. Morocho consulted. Will plan admission/therapeutic paracentesis. Pt given prednisone 50mg PO for " CTPA premedication secondary to iodine allergy. Will hold anticoagulation pending paracentesis.  [SD]      ED Course User Index  [SD] Brennon Lin MD      Labs Reviewed   COMPREHENSIVE METABOLIC PANEL - Abnormal; Notable for the following:        Result Value    Glucose 172 (*)     Potassium 3.3 (*)     Calcium 7.9 (*)     Albumin 2.70 (*)     Alkaline Phosphatase 248 (*)     Globulin 4.1 (*)     A/G Ratio 0.7 (*)     All other components within normal limits    Narrative:     The MDRD GFR formula is only valid for adults with stable renal function between ages 18 and 70.   LIPASE - Abnormal; Notable for the following:     Lipase 526 (*)     All other components within normal limits   URINALYSIS W/ MICROSCOPIC IF INDICATED (NO CULTURE) - Abnormal; Notable for the following:     Urobilinogen, UA 2.0 E.U./dL (*)     All other components within normal limits    Narrative:     Urine microscopic not indicated.   PROTIME-INR - Abnormal; Notable for the following:     Protime 15.8 (*)     INR 1.30 (*)     All other components within normal limits    Narrative:     Therapeutic range for most indications is 2.0-3.0 INR,  or 2.5-3.5 for mechanical heart valves.   LACTIC ACID, PLASMA - Abnormal; Notable for the following:     Lactate 2.5 (*)     All other components within normal limits   CBC WITH AUTO DIFFERENTIAL - Abnormal; Notable for the following:     RBC 3.67 (*)     Hemoglobin 12.1 (*)     Hematocrit 35.0 (*)     RDW 14.7 (*)     RDW-SD 51.8 (*)     Platelets 115 (*)     Monocyte % 12.9 (*)     All other components within normal limits   CK - Abnormal; Notable for the following:     Creatine Kinase 37 (*)     All other components within normal limits   D-DIMER, QUANTITATIVE - Abnormal; Notable for the following:     D-Dimer, Quantitative >4,000 (*)     All other components within normal limits    Narrative:     Dimer values <500 ng/ml FEU are FDA approved as aid in diagnosis of deep venous thrombosis and pulmonary  embolism.  This test should not be used in an exclusion strategy with pretest probability alone.    A recent guideline regarding diagnosis for pulmonary thromboembolism recommends an adjusted exclusion criterion of age x 10 ng/ml FEU for patients >50 years of age (Kaitlynn Intern Med 2015; 163: 701-711).   AMYLASE - Normal   APTT - Normal    Narrative:     The recommended Heparin therapeutic range is 68-97 seconds.   AMMONIA - Normal   MAGNESIUM - Normal   CK MB - Normal   TROPONIN (IN-HOUSE)   TROPONIN (IN-HOUSE)   TSH+FREE T4   LACTIC ACID REFLEX TIMER   TYPE AND SCREEN   PREVIOUS HISTORY   CBC AND DIFFERENTIAL    Narrative:     The following orders were created for panel order CBC & Differential.  Procedure                               Abnormality         Status                     ---------                               -----------         ------                     CBC Auto Differential[[id number]]        Abnormal            Final result                 Please view results for these tests on the individual orders.   EXTRA TUBES    Narrative:     The following orders were created for panel order Extra Tubes.  Procedure                               Abnormality         Status                     ---------                               -----------         ------                     Gold Top - SST[[id number]]                                   Final result                 Please view results for these tests on the individual orders.   GOLD TOP - SST     Xr Chest 1 View    Result Date: 7/11/2018  Narrative: PROCEDURE: Single chest view AP REASON FOR EXAM:abd pain FINDINGS: Comparison exam dated May 4, 2015. Cardiac and pulmonary vasculature are normal. Lungs are clear. Pleural spaces are normal. No acute osseous abnormality.     Impression: No acute cardiopulmonary abnormality. Electronically signed by:  Faisal Chu MD  7/11/2018 3:20 PM CDT Workstation: [id number]                [facility]      Final diagnoses:   Atrial  fibrillation, unspecified type (CMS/HCC)   Dyspnea, unspecified type   Cirrhosis of liver with ascites, unspecified hepatic cirrhosis type (CMS/HCC)            Brennon Lin MD  07/11/18 4651

## 2018-07-11 NOTE — ED NOTES
Pt returned from [facility]. Report from Radiology tech. 8.5 liters from peritoneum removed.      Stefani Horn RN  07/11/18 3417

## 2018-07-11 NOTE — PRE-PROCEDURE NOTE
Patient presents for US paracentesis. Procedure, risks , and benefits discussed with patient and patient  gave informed consent.  H&P dated 7/10/2018 reviewed with no changes.

## 2018-07-12 NOTE — DISCHARGE PLACEMENT REQUEST
"Rosmery, Goyo  (84 y.o. Male)     Date of Birth Social Security Number Address Home Phone MRN    1934  [address] [address] [phone] [id number]    Yazidi Marital Status          Evangelical        Admission Date Admission Type Admitting Provider Attending Provider Department, Room/Bed    7/11/18 Emergency Cliff Morocho MD Williams, Kevin L, MD [address], 309/1    Discharge Date Discharge Disposition Discharge Destination                       Attending Provider:  Cliff Morocho MD    Allergies:  Codeine, Iodine, Morphine And Related, Penicillins    Isolation:  None   Infection:  None   Code Status:  CPR    Ht:  172.7 cm (68\")   Wt:  69.8 kg (153 lb 12.8 oz)    Admission Cmt:  None   Principal Problem:  None                Active Insurance as of 7/11/2018     Primary Coverage     Payor Plan Insurance Group Employer/Plan Group    [place]      Payor Plan Address Payor Plan Phone Number Effective From Effective To    [address] [phone] 7/11/2018     [facility] [id number]       Subscriber Name Subscriber Birth Date Member ID       ROSMERYGOYO  1934 [id number]                 Emergency Contacts      (Rel.) Home Phone Work Phone Mobile Phone    Dora Gant (Daughter) [phone] -- --            Insurance Information                [place] Phone: [phone]    Subscriber: Goyo Botello Jr. Subscriber#: [id number]    Group#:  Precert#:           "

## 2018-07-12 NOTE — PLAN OF CARE
Problem: Patient Care Overview  Goal: Plan of Care Review  Outcome: Ongoing (interventions implemented as appropriate)   07/12/18 0246   Coping/Psychosocial   Plan of Care Reviewed With patient   Plan of Care Review   Progress no change

## 2018-07-12 NOTE — PLAN OF CARE
Problem: Patient Care Overview  Goal: Plan of Care Review  Outcome: Ongoing (interventions implemented as appropriate)   07/12/18 1343   Coping/Psychosocial   Plan of Care Reviewed With patient   Plan of Care Review   Progress improving   OTHER   Outcome Summary 27 lb weight loss; removed 8.5L paracentesis

## 2018-07-12 NOTE — CONSULTS
"SUBJECTIVE:   7/12/2018    Name: Rodriguez Botello Jr.  DOD: 1934    REASON FOR CONSULT: Ascites.    Chief Complaint:     Chief Complaint   Patient presents with   • Abdominal Pain       Subjective     Patient is 84 y.o. male who is in the hospital because of worsening ascites.  Patient had paracentesis done with 8 L of fluid removed.  Patient states he feels much better at this time.  Patient's family is stating that it appears as though the fluid is reaccumulating.  Patient has decreasing edema in his legs which is fluid is probably redistribution of the fluid.  Discussed patient importance of decreasing salt intake.     ROS/HISTORY/ CURRENT MEDICATIONS/OBJECTIVE/VS/PE:   Review of Systems:   Review of Systems    History:     Past Medical History:   Diagnosis Date   • GERD (gastroesophageal reflux disease)    • History of echocardiogram 05/04/2015    Echocardiogram W/ color flow 42885 (Low normal LV funciton with Ef of 50-55%. Normal RV size and funciton. Grade 1A diastolic dysfunction. No indication of left ventricular or left atrial thrombus. No sig. valvular regurg or stenosis.)   • Hyperlipidemia    • Hypertension      Past Surgical History:   Procedure Laterality Date   • BACK SURGERY      Approximatly 20 Years Prior To Todays Clinical Appointment Dated 03/28/2017 In [facility]     Family History   Problem Relation Age of Onset   • Heart attack Other    • Heart disease Other    • Stroke Other    • Hypertension Other    • Diabetes Other    • Lung cancer Other      Social History   Substance Use Topics   • Smoking status: Never Smoker   • Smokeless tobacco: Current User     Types: Chew   • Alcohol use Yes      Comment: \"Very Little\"     Prescriptions Prior to Admission   Medication Sig Dispense Refill Last Dose   • aspirin 325 MG tablet Take 325 mg by mouth Daily.   7/11/2018 at 0900   • busPIRone (BUSPAR) 5 MG tablet Take 5 mg by mouth 3 (Three) Times a Day.   7/11/2018 at 0900   • diltiaZEM CD " (CARDIZEM CD) 120 MG 24 hr capsule Take 120 mg by mouth Daily. Every Morning Before Meal For Heart Or To Lower Blood Pressure   7/11/2018 at 0900   • famotidine (PEPCID) 20 MG tablet Take 20 mg by mouth Every Morning.   7/11/2018 at 0900   • fluticasone (FLONASE) 50 MCG/ACT nasal spray 2 sprays into each nostril Daily.   7/11/2018 at 0900   • furosemide (LASIX) 40 MG tablet Take 40 mg by mouth Every Morning.   7/11/2018 at  0900   • HYDROcodone-acetaminophen (NORCO) 5-325 MG per tablet Take 1 tablet by mouth Every 8 (Eight) Hours As Needed for Moderate Pain  (VAS 5-10).   7/11/2018 at 0920   • PARoxetine (PAXIL) 10 MG tablet Take 10 mg by mouth Every Morning.   7/11/2018 at 0900   • potassium chloride (K-DUR,KLOR-CON) 20 MEQ CR tablet Take 20 mEq by mouth 2 (Two) Times a Day.   7/11/2018 at 0900   • risperiDONE (risperDAL) 3 MG tablet Take 3 mg by mouth Every Night.   7/10/2018 at 2100   • acetaminophen (TYLENOL) 500 MG tablet Take 500 mg by mouth Every 4 (Four) Hours As Needed for Mild Pain  or Fever (VAS 1-4).   Unknown at Unknown time     Allergies:  Codeine; Iodine; Morphine and related; and Penicillins    I have reviewed the patient's medical history, surgical history and family history in the available medical record system.     Current Medications:     Current Facility-Administered Medications   Medication Dose Route Frequency Provider Last Rate Last Dose   • cefTRIAXone (ROCEPHIN) 2 g/100 mL 0.9% NS VTB (SAMREEN)  2 g Intravenous Q24H Miguel Sharif MD   2 g at 07/11/18 4855   • HYDROcodone-acetaminophen (NORCO) 5-325 MG per tablet 1 tablet  1 tablet Oral Q6H PRN Miguel Sharif MD   1 tablet at 07/12/18 1867   • metoprolol tartrate (LOPRESSOR) injection 5 mg  5 mg Intravenous Once Brennon Lin MD   Stopped at 07/11/18 3385   • sodium chloride 0.9 % flush 1-10 mL  1-10 mL Intravenous PRN Miguel Sharif MD       • sodium chloride 0.9 % flush 10 mL  10 mL Intravenous PRN Brennon Lin MD   10 mL at 07/11/18 7796        Objective     Physical Exam:   Temp:  [96.6 °F (35.9 °C)-98.7 °F (37.1 °C)] 97.9 °F (36.6 °C)  Heart Rate:  [] 91  Resp:  [18-20] 18  BP: ()/(54-79) 98/56    Physical Exam:  General Appearance:    Alert, cooperative, in no acute distress   Head:    Normocephalic, without obvious abnormality, atraumatic   Eyes:            Lids and lashes normal, conjunctivae and sclerae normal, no   icterus, no pallor, corneas clear, PERRLA   Ears:    Ears appear intact with no abnormalities noted   Throat:   No oral lesions, no thrush, oral mucosa moist   Neck:   No adenopathy, supple, trachea midline, no thyromegaly, no     carotid bruit, no JVD   Back:     No kyphosis present, no scoliosis present, no skin lesions,       erythema or scars, no tenderness to percussion or                   palpation,   range of motion normal   Lungs:     Clear to auscultation,respirations regular, even and                   unlabored    Heart:    Regular rhythm and normal rate, normal S1 and S2, no            murmur, no gallop, no rub, no click   Breast Exam:    Deferred   Abdomen:     Normal bowel sounds, no masses, no organomegaly, soft        non-tender, non-distended, no guarding, no rebound                 tenderness   Genitalia:    Deferred   Extremities:   Moves all extremities well, no edema, no cyanosis, no              redness   Pulses:   Pulses palpable and equal bilaterally   Skin:   No bleeding, bruising or rash   Lymph nodes:   No palpable adenopathy   Neurologic:   Cranial nerves 2 - 12 grossly intact, sensation intact, DTR        present and equal bilaterally      Results Review:     Lab Results   Component Value Date    WBC 4.83 07/12/2018    WBC 5.99 07/11/2018    WBC 5.31 07/06/2018    HGB 10.6 (L) 07/12/2018    HGB 12.1 (L) 07/11/2018    HGB 12.6 (L) 07/06/2018    HCT 30.2 (L) 07/12/2018    HCT 35.0 (L) 07/11/2018    HCT 36.2 (L) 07/06/2018     (L) 07/12/2018     (L) 07/11/2018     (L)  07/06/2018       Results from last 7 days  Lab Units 07/11/18  1441 07/06/18  1520   ALK PHOS U/L 248* 267*   ALT (SGPT) U/L 33 29   AST (SGOT) U/L 41 38       Results from last 7 days  Lab Units 07/11/18  1441 07/06/18  1520   BILIRUBIN mg/dL 1.0 1.1   ALK PHOS U/L 248* 267*     Lipase   Date Value Ref Range Status   07/11/2018 526 (H) 23 - 300 U/L Final     Lab Results   Component Value Date    INR 1.30 (H) 07/11/2018    INR 1.20 07/06/2018    INR 1.3 05/03/2015         Radiology Review:  Imaging Results (last 72 hours)     Procedure Component Value Units Date/Time    CT Angiogram Chest With Contrast [[id number]] Collected:  07/12/18 0803     Updated:  07/12/18 0832    Narrative:       PROCEDURE: CTA chest with contrast PE protocol    COMPARISON: None    HISTORY: Shortness of breath.    TECHNIQUE: Multiple contiguous axial images of the chest are  obtained after the uneventful, intravenous administration of 69  mL Isovue 300 per PE protocol. Coronal and sagittal MIPS are also  created and reviewed.     This exam was performed according to our departmental  dose-optimization program, which includes automated exposure  control, adjustment of the mA and/or kV according to patient size  and/or use of iterative reconstruction technique.    FINDINGS:  Exam is limited by motion artifact. There is minimal bilateral  atelectasis. No focal areas of consolidation or suspicious  pulmonary nodules.    Heart size normal. There is a large left pleural effusion. No  right pleural effusion or pericardial effusion. No  lymphadenopathy in the chest by size criteria.    Limited evaluation of peripheral pulmonary arteries due to motion  artifact. No pulmonary embolism is identified. There is certainly  no large or central PE.    Cholelithiasis. Hepatic surface nodularity, consistent with  cirrhosis. Mild to moderate amount of ascites present in the  upper abdomen.    Multiple age-indeterminate compression deformities involving  the  thoracic spine, most pronounced at T12.       Impression:       1. Limited evaluation for pulmonary embolism due to respiratory  motion artifact. No pulmonary embolism is identified. There is  certainly no large or central PE.  2. Large left pleural effusion resulting in mild compressive  atelectasis of the left lower lobe.  3. Hepatic cirrhosis with sequela of portal hypertension  including a small to moderate amount of ascites in the upper  abdomen.  4. Cholelithiasis.  5. Age-indeterminate compression deformities in the thoracic  spine    Electronically signed by:  Shaquille Kearney MD  7/12/2018 8:31  AM CDT Workstation: [id number]    US Paracentesis [[id number]] Collected:  07/11/18 1633    Specimen:  Body Fluid Updated:  07/11/18 1741    Narrative:       Procedure: Ultrasound paracentesis    Reason for exam: Ascites    FINDINGS: Patient presents for ultrasound directed paracentesis.  Procedure, risks, benefits were explained the patient he gave  informed written consent. Ultrasound was utilized to find  appropriate pocket for paracentesis. The skin surface was marked.  The patient was sterilely prepped and draped and locally  anesthetized with lidocaine. Utilizing ultrasound guidance, a  Yueh catheter was placed within the ascitic fluid. A total of 8.5  L of yellowish ascitic fluid was aspirated. The catheter was then  removed. Patient tolerated procedure well. No immediate  complications. In-house physician was notified of paracentesis  results.      Impression:       Successful ultrasound paracentesis with removal of  8.5 L of yellowish ascitic fluid.    Electronically signed by:  Faisal Chu MD  7/11/2018 5:40 PM CDT  Workstation: [id number]    XR Chest 1 View [[id number]] Collected:  07/11/18 1500     Updated:  07/11/18 1522    Narrative:         PROCEDURE: Single chest view AP    REASON FOR EXAM:abd pain    FINDINGS: Comparison exam dated May 4, 2015. Cardiac and  pulmonary vasculature are normal.  Lungs are clear. Pleural spaces  are normal. No acute osseous abnormality.      Impression:       No acute cardiopulmonary abnormality.    Electronically signed by:  Faisal Chu MD  7/11/2018 3:20 PM CDT  Workstation: [id number]          I reviewed the patient's new clinical results.    I reviewed the patient's new imaging results and agree with the interpretation.     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  Patient will need occasional paracentesis for this.    PLAN: Patient with discharge from GI standpoint.  Patient should've follow-up in GI in 2 weeks.  Patient will need therapeutic paracentesis from time to time.  Patient's diuretics have been increased already.  The risks, benefits, and alternatives of this procedure have been discussed with the patient or the responsible party. The patient understands and agrees to proceed.         Carl Klein MD  07/12/18  1:18 PM         This document has been electronically signed by Carl Klein MD on July 12, 2018 1:18 PM

## 2018-07-12 NOTE — CONSULTS
"Adult Nutrition  Assessment    Patient Name:  Rodriguez Botello Jr.  YOB: 1934  MRN: [id number]  Admit Date:  7/11/2018    Assessment Date:  7/12/2018    Comments:  Pt w/increased nutrition needs r/t cirrhosis. Pt resides in a nursing home and family reports some wt loss but is unsure how much d/t ascites. Family indicates pt has good appetite and can eat \"whatever he wants\" despite being edentulous. Likes SB ensure--RD added to diet order. Will monitor and make recs prn.          Reason for Assessment     Row Name 07/12/18 1103          Reason for Assessment    Reason For Assessment per organizational policy     Diagnosis liver disease               Nutrition/Diet History     Row Name 07/12/18 1104          Nutrition/Diet History    Typical Food/Fluid Intake Pt's daughter reports \"they removed almost 20# of fluid from him\". Says she knows he's lost wt while in nursing home, but is unsure of amt d/t fluid issues. Pt drinks SB ensure at nursing home.             Anthropometrics     Row Name 07/12/18 0600          Anthropometrics    Weight 69.8 kg (153 lb 12.8 oz)             Labs/Tests/Procedures/Meds     Row Name 07/12/18 1105          Labs/Procedures/Meds    Lab Results Reviewed reviewed, pertinent     Lab Results Comments Bg [phone]             Physical Findings     Row Name 07/12/18 1106          Physical Findings    Gastrointestinal ascites     Oral/Mouth Cavity poor dentition   pt has not teeth and does not wear his dentures.             Estimated/Assessed Needs     Row Name 07/12/18 1107          Calculation Measurements    Weight Used For Calculations 68 kg (150 lb)        Estimated/Assessed Needs    Additional Documentation Calorie Requirements (Group);Fluid Requirements (Group);Protein Requirements (Group)        Calorie Requirements    Weight Used For Calorie Calculations 68 kg (150 lb)     Estimated Calorie Requirement (kcal/day) 1750        KCAL/KG    14 Kcal/Kg (kcal) 952.56     15 " Kcal/Kg (kcal) 1020.6     18 Kcal/Kg (kcal) 1224.72     20 Kcal/Kg (kcal) 1360.8     25 Kcal/Kg (kcal) 1701     30 Kcal/Kg (kcal) 2041.2     35 Kcal/Kg (kcal) 2381.4     40 Kcal/Kg (kcal) 2721.6     45 Kcal/Kg (kcal) 3061.8     50 Kcal/Kg (kcal) 3402        Bayfield-St. Jeor Equation    RMR (Bay[place]-St. Jeor Equation) 1344.9        Protein Requirements    Est Protein Requirement Amount (gms/kg) 1.2 gm protein     Estimated Protein Requirements (gms/day) 81.65        Fluid Requirements    Estimated Fluid Requirements (mL/day) 1750     RDA Method (mL) 1750     Elham-Marie Method (over 20 kg) 2860.8             Nutrition Prescription Ordered     Row Name 07/12/18 1109          Nutrition Prescription PO    Current PO Diet Regular     Fluid Consistency Thin     Common Modifiers Low Sodium             Evaluation of Received Nutrient/Fluid Intake     Row Name 07/12/18 1109 07/12/18 1107       Calculation Measurements    Weight Used For Calculations  -- 68 kg (150 lb)       PO Evaluation    Number of Days PO Intake Evaluated Insufficient Data  --            Evaluation of Prescribed Nutrient/Fluid Intake     Row Name 07/12/18 1107          Calculation Measurements    Weight Used For Calculations 68 kg (150 lb)           Electronically signed by:  Tricia Campa RD  07/12/18 11:17 AM

## 2018-07-12 NOTE — PROGRESS NOTES
MEDICINE DAILY PROGRESS NOTE  NAME: Rodriguez Botello Jr.  : 1934  MRN: [id number]     LOS: 1 day     PROVIDER OF SERVICE: Miguel Sharif MD    Chief Complaint: Atrial fibrillation (CMS/HCC)    Subjective:   HPI: Rodriguez Botello Jr. is a 84 y.o. male who presents with abdominal pain from [facility]..     Patient with known cirrhosis and ascites has had worsening ascites and abdominal pain.  As such he came to the ED today.  Patient denies any fever, chills, nausea, or vomiting today. Patient does endorse some anorexia with the increase in abdominal pain.     Interval History:  History taken from: patient chart RN  . Patient feeling at baseline today. Very alert and spry.  No acute events overnight.    Review of Systems:   Review of Systems   Constitutional: Positive for activity change and fatigue. Negative for appetite change and fever.   HENT: Negative for ear pain and sore throat.    Eyes: Negative for pain and visual disturbance.   Respiratory: Negative for cough and shortness of breath.    Cardiovascular: Negative for chest pain and palpitations.   Gastrointestinal: Positive for abdominal distention and abdominal pain. Negative for constipation, diarrhea, nausea and vomiting.   Endocrine: Negative for cold intolerance and heat intolerance.   Genitourinary: Negative for difficulty urinating and dysuria.   Musculoskeletal: Positive for arthralgias and gait problem.   Skin: Negative for color change and rash.   Neurological: Positive for weakness. Negative for dizziness and headaches.   Hematological: Negative for adenopathy. Does not bruise/bleed easily.   Psychiatric/Behavioral: Negative for agitation, confusion and sleep disturbance.       Objective:     Vital Signs  Vitals:    18 0415 18 0600 18 0852 18 1229   BP: 114/63  125/64 98/56   BP Location: Left arm  Left arm Left arm   Patient Position: Lying  Lying Lying   Pulse: 82  90 91   Resp:    Temp: 97.7  °F (36.5 °C)  96.6 °F (35.9 °C) 97.9 °F (36.6 °C)   TempSrc: Temporal Artery   Tympanic Temporal Artery    SpO2: 95%  93% 93%   Weight:  69.8 kg (153 lb 12.8 oz)     Height:           Physical Exam  Physical Exam   Constitutional: He is oriented to person, place, and time. He appears well-developed and well-nourished. No distress.   HENT:   Head: Normocephalic and atraumatic.   Right Ear: External ear normal.   Left Ear: External ear normal.   Nose: Nose normal.   Eyes: Conjunctivae and EOM are normal. Pupils are equal, round, and reactive to light.   Neck: Normal range of motion. Neck supple.   Cardiovascular: Normal rate, regular rhythm, normal heart sounds and intact distal pulses.    Pulmonary/Chest: Effort normal and breath sounds normal. No respiratory distress. He has no wheezes. He has no rales. He exhibits no tenderness.   Diminished bibasilar.   Abdominal: Soft. Bowel sounds are normal. He exhibits distension. He exhibits no mass. There is tenderness. There is no rebound and no guarding.   Fluid wave present.   Musculoskeletal: Normal range of motion. He exhibits edema (Trace.).   Neurological: He is alert and oriented to person, place, and time.   Skin: Skin is warm and dry. No rash noted. He is not diaphoretic. No erythema. No pallor.   Psychiatric: He has a normal mood and affect. His behavior is normal.   Nursing note and vitals reviewed.      Medication Review    Current Facility-Administered Medications:   •  cefTRIAXone (ROCEPHIN) 2 g/100 mL 0.9% NS VTB (SAMREEN), 2 g, Intravenous, Q24H, Miguel Sharif MD, 2 g at 07/11/18 2336  •  HYDROcodone-acetaminophen (NORCO) 5-325 MG per tablet 1 tablet, 1 tablet, Oral, Q6H PRN, Miguel Sharif MD, 1 tablet at 07/12/18 7537  •  metoprolol tartrate (LOPRESSOR) injection 5 mg, 5 mg, Intravenous, Once, Brennon Lin MD, Stopped at 07/11/18 1549  •  sodium chloride 0.9 % flush 1-10 mL, 1-10 mL, Intravenous, PRN, Miguel Sharif MD  •  Insert peripheral IV, , , Once  **AND** sodium chloride 0.9 % flush 10 mL, 10 mL, Intravenous, PRN, Brennon Lin MD, 10 mL at 07/11/18 1448     Diagnostic Data    Lab Results (last 24 hours)     Procedure Component Value Units Date/Time    CRE Screen by PCR - Swab, Large Intestine, Rectum [[id number]] Collected:  07/11/18 2213    Specimen:  Swab from Large Intestine, Rectum Updated:  07/12/18 0759     CRE SCREEN Not Detected     Comment: Test performed by real-time polymerase chain reaction (qPCR).        OXA 48 Strain Not Detected     Comment: Not Applicable.        IMP STRAIN Not Detected     Comment: Not Applicable        VIM STRAING Not Detected     Comment: Not Applicable        NDM Strain Not Detected     Comment: Not Applicable        KPC Strain Not Detected     Comment: Not Applicable       Body Fluid Culture - Body Fluid, Peritoneum [[id number]]  (Normal) Collected:  07/11/18 1719    Specimen:  Body Fluid from Peritoneum Updated:  07/12/18 0703     BF Culture No growth at less than 24 hours     Gram Stain Result Few (2+) WBCs seen      No organisms seen    Troponin [[id number]]  (Normal) Collected:  07/12/18 0401    Specimen:  Blood Updated:  07/12/18 0455     Troponin I <0.012 ng/mL     Basic Metabolic Panel [[id number]]  (Abnormal) Collected:  07/12/18 0401    Specimen:  Blood Updated:  07/12/18 0443     Glucose 189 (H) mg/dL      BUN 19 mg/dL      Creatinine 0.91 mg/dL      Sodium 137 mmol/L      Potassium 3.7 mmol/L      Chloride 103 mmol/L      CO2 28.0 mmol/L      Calcium 8.0 (L) mg/dL      eGFR Non African Amer 79 mL/min/1.73      BUN/Creatinine Ratio 20.9     Anion Gap 6.0 mmol/L     Narrative:       The MDRD GFR formula is only valid for adults with stable renal function between ages 18 and 70.    CBC & Differential [[id number]] Collected:  07/12/18 0401    Specimen:  Blood Updated:  07/12/18 0407    Narrative:       The following orders were created for panel order CBC & Differential.  Procedure                                Abnormality         Status                     ---------                               -----------         ------                     CBC Auto Differential[[id number]]        Abnormal            Final result                 Please view results for these tests on the individual orders.    CBC Auto Differential [[id number]]  (Abnormal) Collected:  07/12/18 0401    Specimen:  Blood Updated:  07/12/18 0407     WBC 4.83 10*3/mm3      RBC 3.22 (L) 10*6/mm3      Hemoglobin 10.6 (L) g/dL      Hematocrit 30.2 (L) %      MCV 93.8 fL      MCH 32.9 pg      MCHC 35.1 g/dL      RDW 14.4 %      RDW-SD 49.7 (H) fl      MPV 10.2 fL      Platelets 105 (L) 10*3/mm3      Neutrophil % 89.4 (H) %      Lymphocyte % 8.1 (L) %      Monocyte % 2.1 %      Eosinophil % 0.0 %      Basophil % 0.2 %      Immature Grans % 0.2 %      Neutrophils, Absolute 4.32 10*3/mm3      Lymphocytes, Absolute 0.39 (L) 10*3/mm3      Monocytes, Absolute 0.10 10*3/mm3      Eosinophils, Absolute 0.00 10*3/mm3      Basophils, Absolute 0.01 10*3/mm3      Immature Grans, Absolute 0.01 10*3/mm3     Troponin [[id number]]  (Normal) Collected:  07/11/18 2053    Specimen:  Blood Updated:  07/11/18 2144     Troponin I <0.012 ng/mL     Body Fluid Cell Count With Differential - Body Fluid, Peritoneum [[id number]] Collected:  07/11/18 1719    Specimen:  Body Fluid from Peritoneum Updated:  07/11/18 2118    Narrative:       The following orders were created for panel order Body Fluid Cell Count With Differential - Body Fluid, Peritoneum.  Procedure                               Abnormality         Status                     ---------                               -----------         ------                     Body fluid cell count - ...[[id number]]  Abnormal            Final result               Body fluid differential ...[[id number]]                      Final result                 Please view results for these tests on the individual orders.    Body fluid differential - Body Fluid,  [[id number]] Collected:  07/11/18 1719    Specimen:  Body Fluid from Peritoneum Updated:  07/11/18 2118     Neutrophils, Fluid 5 %      Mononuclear, Fluid 95 %     Body fluid cell count - Body Fluid, [[id number]]  (Abnormal) Collected:  07/11/18 1719    Specimen:  Body Fluid from Peritoneum Updated:  07/11/18 1945     WBC, Fluid 146 (H) /mm3      RBC, Fluid 346.5 (H) /mm3      Color, Fluid Yellow     Appearance, Fluid Slightly Cloudy (A)     Volume, Fluid 8,500.0 mL     Lactic Acid, Reflex [[id number]]  (Normal) Collected:  07/11/18 1854    Specimen:  Blood Updated:  07/11/18 1944     Lactate 1.6 mmol/L     Lactic Acid, Reflex Timer (This will reflex a repeat order 3-3:15 hours after ordered.) [[id number]] Collected:  07/11/18 1441    Specimen:  Blood Updated:  07/11/18 1845     Extra Tube Hold for add-ons.     Comment: Auto resulted.       Albumin, Fluid - Body Fluid, Peritoneum [[id number]] Collected:  07/11/18 1719    Specimen:  Body Fluid from Peritoneum Updated:  07/11/18 1816     Albumin, Fluid <1.00 g/dL     Amylase, Body Fluid - Body Fluid, Peritoneum [[id number]] Collected:  07/11/18 1719    Specimen:  Body Fluid from Peritoneum Updated:  07/11/18 1816     Amylase, Fluid <30 U/L     Protein, Body Fluid - Body Fluid, Peritoneum [[id number]] Collected:  07/11/18 1719    Specimen:  Body Fluid from Peritoneum Updated:  07/11/18 1815     Protein, Total, Fluid <2.0 g/dL     Troponin [[id number]]  (Normal) Collected:  07/11/18 1619    Specimen:  Blood Updated:  07/11/18 1658     Troponin I <0.012 ng/mL     TSH+Free T4 [[id number]]  (Normal) Collected:  07/11/18 1441    Specimen:  Blood Updated:  07/11/18 1634     TSH 3.010 mIU/mL      Free T4 1.58 ng/dL     Urinalysis With Microscopic If Indicated (No Culture) - Urine, Clean Catch [[id number]]  (Abnormal) Collected:  07/11/18 1555    Specimen:  Urine from Urine, Clean Catch Updated:  07/11/18 1604     Color, UA Yellow     Appearance, UA Clear     pH, UA 6.0     Specific  Gravity, UA 1.012     Glucose, UA Negative     Ketones, UA Negative     Bilirubin, UA Negative     Blood, UA Negative     Protein, UA Negative     Leuk Esterase, UA Negative     Nitrite, UA Negative     Urobilinogen, UA 2.0 E.U./dL (A)    Narrative:       Urine microscopic not indicated.    D-dimer, Quantitative [[id number]]  (Abnormal) Collected:  07/11/18 1441    Specimen:  Blood Updated:  07/11/18 1548     D-Dimer, Quantitative >4,000 (H) ng/mL (FEU)     Narrative:       Dimer values <500 ng/ml FEU are FDA approved as aid in diagnosis of deep venous thrombosis and pulmonary embolism.  This test should not be used in an exclusion strategy with pretest probability alone.    A recent guideline regarding diagnosis for pulmonary thromboembolism recommends an adjusted exclusion criterion of age x 10 ng/ml FEU for patients >50 years of age ([employer] Med 2015; 163: 701-711).    Protime-INR [[id number]]  (Abnormal) Collected:  07/11/18 1441    Specimen:  Blood Updated:  07/11/18 1548     Protime 15.8 (H) Seconds      INR 1.30 (H)    Narrative:       Therapeutic range for most indications is 2.0-3.0 INR,  or 2.5-3.5 for mechanical heart valves.    aPTT [[id number]]  (Normal) Collected:  07/11/18 1441    Specimen:  Blood Updated:  07/11/18 1548     PTT 30.9 seconds     Narrative:       The recommended Heparin therapeutic range is 68-97 seconds.    Extra Tubes [[id number]] Collected:  07/11/18 1441    Specimen:  Blood from Blood, Venous Line Updated:  07/11/18 1545    Narrative:       The following orders were created for panel order Extra Tubes.  Procedure                               Abnormality         Status                     ---------                               -----------         ------                     Gold Top - SST[[id number]]                                   Final result                 Please view results for these tests on the individual orders.    Gold Top - SST [[id number]] Collected:  07/11/18 1441     Specimen:  Blood Updated:  07/11/18 1545     Extra Tube Hold for add-ons.     Comment: Auto resulted.       Lactic Acid, Plasma [[id number]]  (Abnormal) Collected:  07/11/18 1441    Specimen:  Blood Updated:  07/11/18 1533     Lactate 2.5 (C) mmol/L     CK-MB [[id number]]  (Normal) Collected:  07/11/18 1441    Specimen:  Blood Updated:  07/11/18 1533     CKMB 0.82 ng/mL     CK [[id number]]  (Abnormal) Collected:  07/11/18 1441    Specimen:  Blood Updated:  07/11/18 1524     Creatine Kinase 37 (L) U/L     Magnesium [[id number]]  (Normal) Collected:  07/11/18 1441    Specimen:  Blood Updated:  07/11/18 1524     Magnesium 2.0 mg/dL     Comprehensive Metabolic Panel [[id number]]  (Abnormal) Collected:  07/11/18 1441    Specimen:  Blood Updated:  07/11/18 1522     Glucose 172 (H) mg/dL      BUN 15 mg/dL      Creatinine 0.93 mg/dL      Sodium 140 mmol/L      Potassium 3.3 (L) mmol/L      Chloride 102 mmol/L      CO2 30.0 mmol/L      Calcium 7.9 (L) mg/dL      Total Protein 6.8 g/dL      Albumin 2.70 (L) g/dL      ALT (SGPT) 33 U/L      AST (SGOT) 41 U/L      Alkaline Phosphatase 248 (H) U/L      Total Bilirubin 1.0 mg/dL      eGFR Non African Amer 77 mL/min/1.73      Globulin 4.1 (H) gm/dL      A/G Ratio 0.7 (L) g/dL      BUN/Creatinine Ratio 16.1     Anion Gap 8.0 mmol/L     Narrative:       The MDRD GFR formula is only valid for adults with stable renal function between ages 18 and 70.    Amylase [[id number]]  (Normal) Collected:  07/11/18 1441    Specimen:  Blood Updated:  07/11/18 1522     Amylase 69 U/L     Lipase [[id number]]  (Abnormal) Collected:  07/11/18 1441    Specimen:  Blood Updated:  07/11/18 1522     Lipase 526 (H) U/L     Ammonia [[id number]]  (Normal) Collected:  07/11/18 1441    Specimen:  Blood Updated:  07/11/18 1522     Ammonia 22 umol/L     CBC & Differential [[id number]] Collected:  07/11/18 1441    Specimen:  Blood Updated:  07/11/18 1503    Narrative:       The following orders were created for panel  order CBC & Differential.  Procedure                               Abnormality         Status                     ---------                               -----------         ------                     CBC Auto Differential[[id number]]        Abnormal            Final result                 Please view results for these tests on the individual orders.    CBC Auto Differential [[id number]]  (Abnormal) Collected:  07/11/18 1441    Specimen:  Blood Updated:  07/11/18 1503     WBC 5.99 10*3/mm3      RBC 3.67 (L) 10*6/mm3      Hemoglobin 12.1 (L) g/dL      Hematocrit 35.0 (L) %      MCV 95.4 fL      MCH 33.0 pg      MCHC 34.6 g/dL      RDW 14.7 (H) %      RDW-SD 51.8 (H) fl      MPV 9.9 fL      Platelets 115 (L) 10*3/mm3      Neutrophil % 66.4 %      Lymphocyte % 15.0 %      Monocyte % 12.9 (H) %      Eosinophil % 4.3 %      Basophil % 1.2 %      Immature Grans % 0.2 %      Neutrophils, Absolute 3.98 10*3/mm3      Lymphocytes, Absolute 0.90 10*3/mm3      Monocytes, Absolute 0.77 10*3/mm3      Eosinophils, Absolute 0.26 10*3/mm3      Basophils, Absolute 0.07 10*3/mm3      Immature Grans, Absolute 0.01 10*3/mm3           I reviewed the patient's new clinical results.    Assessment/Plan:     Active Hospital Problems    Diagnosis Date Noted   • Atrial fibrillation (CMS/HCC) [I48.91] 07/11/2018   • Hyperlipidemia [E78.5] 07/11/2018   • GERD (gastroesophageal reflux disease) [K21.9] 07/11/2018   • Hypertension [I10] 07/11/2018   • Other ascites [R18.8] 07/10/2018   • Hepatic cirrhosis (CMS/HCC) [K74.60] 05/09/2017     1. Abdominal pain.   7/12. Culture pending. Afebrile. Improving.  7/11. Low suspicion for SBP. Ascitic tap per IR. PMR < 250 cell/mm3.  Patient afebrile.  Patient does have abdominal pain as such will start on rocephin 2g IV q8h as cefotaxime is non formulary.  Ankit cultures.  2. Ascites.   7/12. Stable today. GI consulted.  7/11. Secondary to #2. Fluid tap done per IR. 8.5 L of yellow fluid aspirated. Fluid  studies ordered.  3. Cirrhosis.   7/12. GI consulted.  Appreciate input.  7/11. Suspect advanced. GI consulted. Appreciate input.  4. Atrial fibrillation.   7/12. NSR.  7/11. First EKG suspicious for atrial fibrillation. Chart review shows patient spontaneously converted to NSR> Will hold off on anticoagulation secondary to ascitic tap and liver disease.  Will discuss with GI. Cardiac monitoring. Cardizem from home meds.  5. Elevated D-dimer.   7/12. CTA negative for central PE. Large left pleural effusion.  7/11. CTA ordered for am with pre medication with steroids. Anticoagulation on hold.    6. Deconditioning. PT/OT.  7. Large left pleural effusion. Patient afebrile, not tachypneic, normocardic, and saturating >95% on RA. Repeat x-ray tmw am.    Will monitor patient's hospital course and adjust treatment as hospital course dictates.    DVT prophylaxis: SCDs  Code status is   Code Status and Medical Interventions:   Ordered at: 07/11/18 1956     Level Of Support Discussed With:    Patient     Code Status:    CPR     Medical Interventions (Level of Support Prior to Arrest):    Full       Plan for disposition:Where: SNF and When:  1-2 days      Time:           This document has been electronically signed by Miguel Sharif MD on July 12, 2018 1:19 PM

## 2018-07-13 NOTE — PROGRESS NOTES
SUBJECTIVE:   7/13/2018  Chief Complaint:     Subjective      Patient is 84 y.o. male who states he feels well.  Patient would like to go home so he can have some chewing tobacco.     CURRENT MEDICATIONS/OBJECTIVE/VS/PE:     Current Medications:     Current Facility-Administered Medications   Medication Dose Route Frequency Provider Last Rate Last Dose   • cefTRIAXone (ROCEPHIN) 2 g/100 mL 0.9% NS VTB (SAMREEN)  2 g Intravenous Q24H Miguel Sharif MD   2 g at 07/12/18 2345   • HYDROcodone-acetaminophen (NORCO) 5-325 MG per tablet 1 tablet  1 tablet Oral Q6H PRN Miguel Sharif MD   1 tablet at 07/13/18 0948   • metoprolol tartrate (LOPRESSOR) injection 5 mg  5 mg Intravenous Once Brennon Lin MD   Stopped at 07/11/18 1549   • sodium chloride 0.9 % flush 1-10 mL  1-10 mL Intravenous PRN Miguel Sharif MD       • sodium chloride 0.9 % flush 10 mL  10 mL Intravenous PRN Brennon Lin MD   10 mL at 07/11/18 1448       Objective     Physical Exam:   Temp:  [97.3 °F (36.3 °C)-98.7 °F (37.1 °C)] 98.7 °F (37.1 °C)  Heart Rate:  [71-98] 98  Resp:  [18] 18  BP: ()/(56-72) 112/57     Physical Exam:  General Appearance:    Alert, cooperative, in no acute distress   Head:    Normocephalic, without obvious abnormality, atraumatic   Eyes:            Lids and lashes normal, conjunctivae and sclerae normal, no   icterus, no pallor, corneas clear, PERRLA   Ears:    Ears appear intact with no abnormalities noted   Throat:   No oral lesions, no thrush, oral mucosa moist   Neck:   No adenopathy, supple, trachea midline, no thyromegaly, no     carotid bruit, no JVD   Back:     No kyphosis present, no scoliosis present, no skin lesions,       erythema or scars, no tenderness to percussion or                   palpation,   range of motion normal   Lungs:     Clear to auscultation,respirations regular, even and                   unlabored    Heart:    Regular rhythm and normal rate, normal S1 and S2, no            murmur, no  gallop, no rub, no click   Breast Exam:    Deferred   Abdomen:     Normal bowel sounds, no masses, no organomegaly, soft        non-tender, non-distended, no guarding, no rebound                 tenderness   Genitalia:    Deferred   Extremities:   Moves all extremities well, no edema, no cyanosis, no              redness   Pulses:   Pulses palpable and equal bilaterally   Skin:   No bleeding, bruising or rash   Lymph nodes:   No palpable adenopathy   Neurologic:   Cranial nerves 2 - 12 grossly intact, sensation intact, DTR        present and equal bilaterally      Results Review:     Lab Results (last 24 hours)     Procedure Component Value Units Date/Time    Basic Metabolic Panel [[id number]]  (Abnormal) Collected:  07/13/18 0522    Specimen:  Blood Updated:  07/13/18 0652     Glucose 137 (H) mg/dL      BUN 22 (H) mg/dL      Creatinine 0.77 mg/dL      Sodium 138 mmol/L      Potassium 3.7 mmol/L      Chloride 104 mmol/L      CO2 28.0 mmol/L      Calcium 8.2 (L) mg/dL      eGFR Non African Amer 96 mL/min/1.73      BUN/Creatinine Ratio 28.6 (H)     Anion Gap 6.0 mmol/L     Narrative:       The MDRD GFR formula is only valid for adults with stable renal function between ages 18 and 70.    Body Fluid Culture - Body Fluid, Peritoneum [[id number]]  (Normal) Collected:  07/11/18 1719    Specimen:  Body Fluid from Peritoneum Updated:  07/13/18 0634     BF Culture No growth at 2 days     Gram Stain Result Few (2+) WBCs seen      No organisms seen    CBC & Differential [[id number]] Collected:  07/13/18 0522    Specimen:  Blood Updated:  07/13/18 0633    Narrative:       The following orders were created for panel order CBC & Differential.  Procedure                               Abnormality         Status                     ---------                               -----------         ------                     CBC Auto Differential[[id number]]        Abnormal            Final result                 Please view results for these  tests on the individual orders.    CBC Auto Differential [[id number]]  (Abnormal) Collected:  07/13/18 0522    Specimen:  Blood Updated:  07/13/18 0633     WBC 12.49 (H) 10*3/mm3      RBC 3.38 (L) 10*6/mm3      Hemoglobin 11.1 (L) g/dL      Hematocrit 31.7 (L) %      MCV 93.8 fL      MCH 32.8 pg      MCHC 35.0 g/dL      RDW 14.7 (H) %      RDW-SD 50.1 (H) fl      MPV 10.5 fL      Platelets 120 (L) 10*3/mm3      Neutrophil % 83.9 (H) %      Lymphocyte % 8.2 (L) %      Monocyte % 7.2 %      Eosinophil % 0.2 %      Basophil % 0.2 %      Immature Grans % 0.3 %      Neutrophils, Absolute 10.48 (H) 10*3/mm3      Lymphocytes, Absolute 1.02 10*3/mm3      Monocytes, Absolute 0.90 10*3/mm3      Eosinophils, Absolute 0.03 10*3/mm3      Basophils, Absolute 0.02 10*3/mm3      Immature Grans, Absolute 0.04 (H) 10*3/mm3            I reviewed the patient's new clinical results.  I reviewed the patient's new imaging results and agree with the interpretation.     ASSESSMENT/PLAN:   ASSESSMENT: Patient with ascites recently had paracentesis.  Patient is feeling well this time.    PLAN: 1 patient to be discharged home follow up in GI office.  #2 patient should be encouraged take his diuretics as an outpatient.  #3 will sign off from a GI standpoint.  The risks, benefits, and alternatives of this procedure have been discussed with the patient or the responsible party- the patient understands and agrees to proceed.         Carl Klein MD  07/13/18  11:40 AM           This document has been electronically signed by Carl Klein MD on July 13, 2018 11:40 AM

## 2018-07-13 NOTE — THERAPY EVALUATION
Acute Care - Physical Therapy Initial Evaluation  [facility]     Patient Name: Rodriguez Botello Jr.  : 1934  MRN: [id number]  Today's Date: 2018   Onset of Illness/Injury or Date of Surgery: 18  Date of Referral to PT: 18  Referring Physician: Itzel Sharif      Admit Date: 2018    Visit Dx:     ICD-10-CM ICD-9-CM   1. Atrial fibrillation, unspecified type (CMS/HCC) I48.91 427.31   2. Dyspnea, unspecified type R06.00 786.09   3. Cirrhosis of liver with ascites, unspecified hepatic cirrhosis type (CMS/HCC) K74.60 571.5   4. Other ascites R18.8 789.59   5. Impaired functional mobility, balance, gait, and endurance Z74.09 V49.89     Patient Active Problem List   Diagnosis   • Hepatic cirrhosis (CMS/HCC)   • Other ascites   • Atrial fibrillation (CMS/HCC)   • Hyperlipidemia   • GERD (gastroesophageal reflux disease)   • Hypertension     Past Medical History:   Diagnosis Date   • GERD (gastroesophageal reflux disease)    • History of echocardiogram 2015    Echocardiogram W/ color flow 05049 (Low normal LV funciton with Ef of 50-55%. Normal RV size and funciton. Grade 1A diastolic dysfunction. No indication of left ventricular or left atrial thrombus. No sig. valvular regurg or stenosis.)   • Hyperlipidemia    • Hypertension      Past Surgical History:   Procedure Laterality Date   • BACK SURGERY      Approximatly 20 Years Prior To [facility] Clinical Appointment Dated 2017 In [place] [facility]        PT ASSESSMENT (last 12 hours)      Physical Therapy Evaluation     Row Name 18 1103          PT Evaluation Time/Intention    Subjective Information complains of;pain  -SEBASTIÁN     Document Type evaluation  -SEBASTIÁN     Mode of Treatment individual therapy;physical therapy  -SEBASTIÁN     Patient Effort good  -SEBASTIÁN     Symptoms Noted During/After Treatment dizziness  -SEBASTIÁN     Comment Pt complained of dizziness sitting EOB;did not worsen with standing and improved once pt returned to supine  -SEBASTIÁN      Row Name 07/13/18 1103          General Information    Patient Profile Reviewed? yes  -SEBASTIÁN     Onset of Illness/Injury or Date of Surgery 07/11/18  -SEBASTIÁN     Referring Physician Itzel Sharif  -SEBASTIÁN     Patient Observations alert;cooperative;agree to therapy  -SEBASTIÁN     Patient/Family Observations dtr present  -SEBASTIÁN     General Observations of Patient pt supine;noted IV, telemetry, bed exit armed  -SEBASTIÁN     Prior Level of Function min assist:;gait;transfer;independent:;w/c or scooter   dtr says NHstaff want pt to have assistance with walking  -SEBASTIÁN     Equipment Currently Used at Home walker, standard;wheelchair  -SEBASTIÁN     Pertinent History of Current Functional Problem Pt admitted to [facility] with atrial fibulation with h/o lliver cirrhosis with ascitis;pt underwent paracentesis 7/11/2018.  -SEBASTIÁN     Existing Precautions/Restrictions fall  -SEBASTIÁN     Limitations/Impairments safety/cognitive;hearing  -SEBASTIÁN     Equipment Issued to Patient gait belt  -SEBASTIÁN     Risks Reviewed patient:;LOB;nausea/vomiting;dizziness;increased discomfort;change in vital signs  -SEBASTIÁN     Benefits Reviewed patient:;improve function;increase strength;increase balance  -     Row Name 07/13/18 1103          Relationship/Environment    Lives With facility resident   [facility]  -     Row Name 07/13/18 1103          Resource/Environmental Concerns    Current Living Arrangements extended care facility   [facility]  -     Row Name 07/13/18 1103          Cognitive Assessment/Intervention- PT/OT    Orientation Status (Cognition) oriented to;person;place;situation   knew current month, not year  -SEBASTIÁN     Follows Commands (Cognition) 75-90% accuracy  -     Safety Deficit (Cognitive) safety precautions awareness;safety precautions follow-through/compliance;insight into deficits/self awareness  -SEBASTIÁN     Row Name 07/13/18 1103          Safety Issues, Functional Mobility    Safety Issues Affecting Function (Mobility) safety precaution awareness;safety precautions  follow-through/compliance;insight into deficits/self awareness  -[facility] Name 07/13/18 1103          Bed Mobility Assessment/Treatment    Bed Mobility Assessment/Treatment supine-sit;sit-supine  -     Supine-Sit [place] (Bed Mobility) contact guard;verbal cues;nonverbal cues (demo/gesture)   for log rolling  -     Sit-Supine [place] (Bed Mobility) supervision  -SEBASTIÁN     Row Name 07/13/18 1103          Transfer Assessment/Treatment    Transfer Assessment/Treatment sit-stand transfer;stand-sit transfer  -     Sit-Stand [place] (Transfers) contact guard  -     Stand-Sit [place] (Transfers) contact guard  -SEBASTIÁN     Row Name 07/13/18 1103          Sit-Stand Transfer    Assistive Device (Sit-Stand Transfers) walker, front-wheeled  -SEBASTIÁN     Row Name 07/13/18 1103          Stand-Sit Transfer    Assistive Device (Stand-Sit Transfers) walker, front-wheeled  -SEBASTIÁN     Row Name 07/13/18 1103          Gait/Stairs Assessment/Training    Gait/Stairs Assessment/Training gait/ambulation assistive device  -     [place] Level (Gait) contact guard  -     Assistive Device (Gait) walker, front-wheeled  -     Distance in Feet (Gait) 5   stayed in room due to dizziness  -[facility] Name 07/13/18 1103          General ROM    GENERAL ROM COMMENTS AROM WFL all extremities  -SEBASTIÁN     Row Name 07/13/18 1103          General Assessment (Manual Muscle Testing)    General Manual Muscle Testing (MMT) Assessment upper extremity strength deficits identified;lower extremity strength deficits identified  -SEBASTIÁN     Row Name 07/13/18 1103          Upper Extremity (Manual Muscle Testing)    Comment, MMT: Upper Extremity Please refer to OT evaluation for BUE strength detail  -SEBASTIÁN     Row Name 07/13/18 1103          Lower Extremity (Manual Muscle Testing)    Comment, MMT: Lower Extremity BLE: 4-/5 ankles/feet, knees, hips  -SEBASTIÁN     Row Name 07/13/18 1103          Sensory Assessment/Intervention    Sensory General Assessment  light touch sensation deficits identified  -     Row Name 07/13/18 1103          Hearing Assessment    Hearing Status hearing impairment, bilaterally  -     Row Name 07/13/18 1103          Vision Assessment/Intervention    Visual Impairment/Limitations --   glasses parttime  -     Row Name 07/13/18 1103          Light Touch Sensation Assessment    Left Upper Extremity: Light Touch Sensation Assessment intact  -     Right Upper Extremity: Light Touch Sensation Assessment mild impairment, 75% or more correct responses   some numbness that improved with time(pt laying on arm?)  -     Left Lower Extremity: Light Touch Sensation Assessment intact  -     Right Lower Extremity: Light Touch Sensation Assessment intact  -[facility] Name 07/13/18 1103          Pain Assessment    Additional Documentation Pain Scale: Numbers Pre/Post-Treatment (Group)  -     Row Name 07/13/18 1103          Pain Scale: Numbers Pre/Post-Treatment    Pain Scale: Numbers, Pretreatment 9/10  -SEBASTIÁN     Pain Scale: Numbers, Post-Treatment 8/10  -     Pain Location abdomen  -     Pain Intervention(s) Medication (See MAR)   dtr/pt reports recently had pain med  -     Row Name             Wound 07/11/18 1950 medial coccyx MASD (moisture associated skin damage);skin tear    Wound - Properties Group Date first assessed: 07/11/18  -AC Time first assessed: 1950  -AC Present On Admission : yes;picture taken  -AC Orientation: medial  -AC Location: coccyx  -AC Type: MASD (moisture associated skin damage);skin tear  -AC    Row Name 07/13/18 1103          Plan of Care Review    Plan of Care Reviewed With patient;daughter  -[facility] Name 07/13/18 1103          Physical Therapy Clinical Impression    Date of Referral to PT 07/12/18  -SEBASTIÁN     PT Diagnosis (PT Clinical Impression) impaired gait and mobility  -     Prognosis (PT Clinical Impression) good  -     Patient/Family Goals Statement (PT Clinical Impression) be stronger;able to assist  with care  -     Criteria for Skilled Interventions Met (PT Clinical Impression) yes;treatment indicated  -     Pathology/Pathophysiology Noted (Describe Specifically for Each System) musculoskeletal;cardiovascular  -     Impairments Found (describe specific impairments) aerobic capacity/endurance;ergonomics and body mechanics;gait, locomotion, and balance  -     Functional Limitations in Following Categories (Describe Specific Limitations) self-care;home management;community/leisure  -     Disability: Inability to Perform Actions/Activities of Required Roles (describe specific disability) community/leisure  -     Rehab Potential (PT Clinical Summary) good, to achieve stated therapy goals  -     Predicted Duration of Therapy (PT) until discharge or goals met  -     Care Plan Review (PT) evaluation/treatment results reviewed;care plan/treatment goals reviewed;risks/benefits reviewed;current/potential barriers reviewed;patient/other agree to care plan  -     Care Plan Review, Other Participant (PT Clinical Impression) daughter  -SEBASTIÁN     Row Name 07/13/18 1103          Vital Signs    Pre Systolic BP Rehab 112  -SEBASTIÁN     Pre Treatment Diastolic BP 57  -SEBASTIÁN     Post Systolic BP Rehab 110  -SEBASTIÁN     Post Treatment Diastolic BP 60  -SEBASTIÁN     Pretreatment Heart Rate (beats/min) 68  -SEBASTIÁN     Posttreatment Heart Rate (beats/min) 67  -SEBASTIÁN     Pre SpO2 (%) 96  -SEBASTIÁN     O2 Delivery Pre Treatment room air  -SEBASTIÁN     Post SpO2 (%) 93  -SEBASTIÁN     O2 Delivery Post Treatment room air  -SEBASTIÁN     Pre Patient Position Supine  -SEBASTIÁN     Post Patient Position Sitting  -     Row Name 07/13/18 1103          Physical Therapy Goals    Transfer Goal Selection (PT) transfer, PT goal 1  -SEBASTIÁN     Gait Training Goal Selection (PT) gait training, PT goal 1  -SEBASTIÁN     Strength Goal Selection (PT) strength, PT goal 1  -     Additional Documentation Strength Goal Selection (PT) (Row)  -     Row Name 07/13/18 1103          Transfer Goal 1 (PT)     Activity/Assistive Device (Transfer Goal 1, PT) sit-to-stand/stand-to-sit;bed-to-chair/chair-to-bed  -SEBASTIÁN     [place] Level/Cues Needed (Transfer Goal 1, PT) supervision required  -SEBASTIÁN     Time Frame (Transfer Goal 1, PT) by discharge  -SEBASTIÁN     Progress/Outcome (Transfer Goal 1, PT) goal not met  -     Row Name 07/13/18 1103          Gait Training Goal 1 (PT)    Activity/Assistive Device (Gait Training Goal 1, PT) gait (walking locomotion);assistive device use  -SEBASTIÁN     [place] Level (Gait Training Goal 1, PT) supervision required  -SEBASTIÁN     Distance (Gait Goal 1, PT) 150ft  -SEBASTIÁN     Time Frame (Gait Training Goal 1, PT) by discharge  -SEBASTIÁN     Progress/Outcome (Gait Training Goal 1, PT) goal not met  -SEBASTIÁN     Row Name 07/13/18 1103          Strength Goal 1 (PT)    Strength Goal 1 (PT) Pt will tolerate 20 reps of AROM exercises OOB in sitting with VSS  -SEBASTIÁN     Time Frame (Strength Goal 1, PT) by discharge  -SEBASTIÁN     Progress/Outcome (Strength Goal 1, PT) goal not met  -     Row Name 07/13/18 1103          Positioning and Restraints    Pre-Treatment Position in bed  -SEBASTIÁN     Post Treatment Position bed  -SEBASTIÁN     In Bed sitting EOB;call light within reach;encouraged to call for assist;exit alarm on;with family/caregiver   Pt eating lunch  -SEBASTIÁN       User Key  (r) = Recorded By, (t) = Taken By, (c) = Cosigned By    Initials Name Provider Type    SEBASTIÁN Pantoja, PT Physical Therapist    ALEJANDRINA Villanueva RN Registered Nurse              PT Recommendation and Plan  Anticipated Discharge Disposition (PT): skilled nursing facility  Planned Therapy Interventions (PT Eval): balance training, bed mobility training, gait training, patient/family education, strengthening, transfer training, wheelchair management/propulsion training  Therapy Frequency (PT Clinical Impression): daily  Outcome Summary/Treatment Plan (PT)  Anticipated Discharge Disposition (PT): skilled nursing facility  Plan of Care Reviewed With: patient,  daughter          Outcome Measures     Row Name 07/13/18 1103             How much help from another person do you currently need...    Turning from your back to your side while in flat bed without using bedrails? 3  -SEBASTIÁN      Moving from lying on back to sitting on the side of a flat bed without bedrails? 3  -SEBASTIÁN      Moving to and from a bed to a chair (including a wheelchair)? 3  -SEBASTIÁN      Standing up from a chair using your arms (e.g., wheelchair, bedside chair)? 3  -SEBASTIÁN      Climbing 3-5 steps with a railing? 3  -SEBASTIÁN      To walk in hospital room? 3  -SEBASTIÁN      AM-PAC 6 Clicks Score 18  -SEBASTIÁN         Functional Assessment    Outcome Measure Options AM-PAC 6 Clicks Basic Mobility (PT)  -        User Key  (r) = Recorded By, (t) = Taken By, (c) = Cosigned By    Initials Name Provider Type    SEBASTIÁN Pantoja PT Physical Therapist           Time Calculation:         PT Charges     Row Name 07/13/18 1406             Time Calculation    Start Time 1103  -      Stop Time 1131  -      Time Calculation (min) 28 min  -      PT Received On 07/13/18  -      PT Goal Re-Cert Due Date 07/26/18  -         Time Calculation- PT    Total Timed Code Minutes- PT 13 minute(s)  -        User Key  (r) = Recorded By, (t) = Taken By, (c) = Cosigned By    Initials Name Provider Type    SEBASTIÁN Pantoja PT Physical Therapist        Therapy Suggested Charges     Code   Minutes Charges    None           Therapy Charges for Today     Code Description Service Date Service Provider Modifiers Qty    41440001276 HC PT MOBILITY CURRENT 7/13/2018 Jaye Pantoja, PT GP, CK 1    96173075608 HC PT MOBILITY PROJECTED 7/13/2018 Jaye Pantoja, PT GP,  1    94168700977 HC PT EVAL MOD COMPLEXITY 1 7/13/2018 Jaye Pantoja, PT GP 1    29520440266 HC GAIT TRAINING EA 15 MIN 7/13/2018 Jaye Pantoja, PT GP 1          PT G-Codes  PT Professional Judgement Used?: Yes  Outcome Measure Options: AM-PAC 6 Clicks Basic Mobility (PT)  Score: 18  Functional  Limitation: Mobility: Walking and moving around  Mobility: Walking and Moving Around Current Status (): At least 40 percent but less than 60 percent impaired, limited or restricted  Mobility: Walking and Moving Around Goal Status (): At least 20 percent but less than 40 percent impaired, limited or restricted      Jaye Pantoja, PT  7/13/2018

## 2018-07-13 NOTE — PROGRESS NOTES
MEDICINE DAILY PROGRESS NOTE  NAME: Rodriguez Botello Jr.  : 1934  MRN: [id number]     LOS: 2 days     PROVIDER OF SERVICE: Miguel Sharif MD    Chief Complaint: Atrial fibrillation (CMS/HCC)    Subjective:   HPI: Rodriguez Botello Jr. is a 84 y.o. male who presents with abdominal pain from [facility].     Patient with known cirrhosis and ascites has had worsening ascites and abdominal pain.  As such he came to the ED today.  Patient denies any fever, chills, nausea, or vomiting today. Patient does endorse some anorexia with the increase in abdominal pain.     Interval History:  History taken from: patient chart RN  . Patient feels good today.  Wants to go back to SNF to see Mitzi.  No complaints. No acute events overnight. Overall improving.  . Patient feeling at baseline today. Very alert and spry.  No acute events overnight.    Review of Systems:   Review of Systems   Constitutional: Positive for activity change and fatigue. Negative for appetite change and fever.   HENT: Negative for ear pain and sore throat.    Eyes: Negative for pain and visual disturbance.   Respiratory: Negative for cough and shortness of breath.    Cardiovascular: Negative for chest pain and palpitations.   Gastrointestinal: Positive for abdominal distention and abdominal pain. Negative for constipation, diarrhea, nausea and vomiting.   Endocrine: Negative for cold intolerance and heat intolerance.   Genitourinary: Negative for difficulty urinating and dysuria.   Musculoskeletal: Positive for arthralgias and gait problem.   Skin: Negative for color change and rash.   Neurological: Positive for weakness. Negative for dizziness and headaches.   Hematological: Negative for adenopathy. Does not bruise/bleed easily.   Psychiatric/Behavioral: Negative for agitation, confusion and sleep disturbance.       Objective:     Vital Signs  Vitals:    18 0024 18 0427 18 0805 18 0813   BP:  113/57 112/57     BP Location:  Left arm Left arm    Patient Position:  Lying Lying    Pulse: 73 78 96 98   Resp:  18 18    Temp:  97.6 °F (36.4 °C) 98.7 °F (37.1 °C)    TempSrc:  Temporal Artery  Temporal Artery     SpO2:  94% 95%    Weight:  69.8 kg (153 lb 14.4 oz)     Height:           Physical Exam  Physical Exam   Constitutional: He is oriented to person, place, and time. He appears well-developed and well-nourished. No distress.   HENT:   Head: Normocephalic and atraumatic.   Right Ear: External ear normal.   Left Ear: External ear normal.   Nose: Nose normal.   Eyes: Conjunctivae and EOM are normal. Pupils are equal, round, and reactive to light.   Neck: Normal range of motion. Neck supple.   Cardiovascular: Normal rate, regular rhythm, normal heart sounds and intact distal pulses.    Pulmonary/Chest: Effort normal and breath sounds normal. No respiratory distress. He has no wheezes. He has no rales. He exhibits no tenderness.   Diminished bibasilar.   Abdominal: Soft. Bowel sounds are normal. He exhibits distension. He exhibits no mass. There is tenderness. There is no rebound and no guarding.   Fluid wave present.   Musculoskeletal: Normal range of motion. He exhibits edema (Trace.).   Neurological: He is alert and oriented to person, place, and time.   Skin: Skin is warm and dry. No rash noted. He is not diaphoretic. No erythema. No pallor.   Psychiatric: He has a normal mood and affect. His behavior is normal.   Nursing note and vitals reviewed.      Medication Review    Current Facility-Administered Medications:   •  busPIRone (BUSPAR) tablet 5 mg, 5 mg, Oral, TID, Miguel Sharif MD  •  cefTRIAXone (ROCEPHIN) 2 g/100 mL 0.9% NS VTB (SAMREEN), 2 g, Intravenous, Q24H, Miguel Sharif MD, 2 g at 07/12/18 6497  •  diltiaZEM CD (CARDIZEM CD) 24 hr capsule 120 mg, 120 mg, Oral, Daily, Miguel Sharif MD  •  famotidine (PEPCID) tablet 20 mg, 20 mg, Oral, QAM, Miguel Sharif MD  •  fluticasone (FLONASE) 50 MCG/ACT nasal spray 2 spray, 2  spray, Nasal, Daily, Miguel Sharif MD  •  furosemide (LASIX) tablet 40 mg, 40 mg, Oral, QAM, Miguel Sharif MD  •  HYDROcodone-acetaminophen (NORCO) 5-325 MG per tablet 1 tablet, 1 tablet, Oral, Q6H PRN, Miguel Sharif MD, 1 tablet at 07/13/18 0948  •  metoprolol tartrate (LOPRESSOR) injection 5 mg, 5 mg, Intravenous, Once, Brennon Lin MD, Stopped at 07/11/18 1549  •  PARoxetine (PAXIL) tablet 10 mg, 10 mg, Oral, QAM, Miguel Sharif MD  •  risperiDONE (risperDAL) tablet 3 mg, 3 mg, Oral, Nightly, Miguel Sharif MD  •  sodium chloride 0.9 % flush 1-10 mL, 1-10 mL, Intravenous, PRN, Miguel Sharif MD  •  Insert peripheral IV, , , Once **AND** sodium chloride 0.9 % flush 10 mL, 10 mL, Intravenous, PRN, Brennon Lin MD, 10 mL at 07/11/18 1448     Diagnostic Data    Lab Results (last 24 hours)     Procedure Component Value Units Date/Time    Basic Metabolic Panel [[id number]]  (Abnormal) Collected:  07/13/18 0522    Specimen:  Blood Updated:  07/13/18 0652     Glucose 137 (H) mg/dL      BUN 22 (H) mg/dL      Creatinine 0.77 mg/dL      Sodium 138 mmol/L      Potassium 3.7 mmol/L      Chloride 104 mmol/L      CO2 28.0 mmol/L      Calcium 8.2 (L) mg/dL      eGFR Non African Amer 96 mL/min/1.73      BUN/Creatinine Ratio 28.6 (H)     Anion Gap 6.0 mmol/L     Narrative:       The MDRD GFR formula is only valid for adults with stable renal function between ages 18 and 70.    Body Fluid Culture - Body Fluid, Peritoneum [[id number]]  (Normal) Collected:  07/11/18 1719    Specimen:  Body Fluid from Peritoneum Updated:  07/13/18 0634     BF Culture No growth at 2 days     Gram Stain Result Few (2+) WBCs seen      No organisms seen    CBC & Differential [[id number]] Collected:  07/13/18 0522    Specimen:  Blood Updated:  07/13/18 0633    Narrative:       The following orders were created for panel order CBC & Differential.  Procedure                               Abnormality         Status                     ---------                                -----------         ------                     CBC Auto Differential[[id number]]        Abnormal            Final result                 Please view results for these tests on the individual orders.    CBC Auto Differential [[id number]]  (Abnormal) Collected:  07/13/18 0522    Specimen:  Blood Updated:  07/13/18 0633     WBC 12.49 (H) 10*3/mm3      RBC 3.38 (L) 10*6/mm3      Hemoglobin 11.1 (L) g/dL      Hematocrit 31.7 (L) %      MCV 93.8 fL      MCH 32.8 pg      MCHC 35.0 g/dL      RDW 14.7 (H) %      RDW-SD 50.1 (H) fl      MPV 10.5 fL      Platelets 120 (L) 10*3/mm3      Neutrophil % 83.9 (H) %      Lymphocyte % 8.2 (L) %      Monocyte % 7.2 %      Eosinophil % 0.2 %      Basophil % 0.2 %      Immature Grans % 0.3 %      Neutrophils, Absolute 10.48 (H) 10*3/mm3      Lymphocytes, Absolute 1.02 10*3/mm3      Monocytes, Absolute 0.90 10*3/mm3      Eosinophils, Absolute 0.03 10*3/mm3      Basophils, Absolute 0.02 10*3/mm3      Immature Grans, Absolute 0.04 (H) 10*3/mm3           I reviewed the patient's new clinical results.    Assessment/Plan:     Active Hospital Problems    Diagnosis Date Noted   • **Atrial fibrillation (CMS/HCC) [I48.91] 07/11/2018   • Hyperlipidemia [E78.5] 07/11/2018   • GERD (gastroesophageal reflux disease) [K21.9] 07/11/2018   • Hypertension [I10] 07/11/2018   • Other ascites [R18.8] 07/10/2018   • Hepatic cirrhosis (CMS/HCC) [K74.60] 05/09/2017     1. Abdominal pain.   7/13. No pain today.  Some increase in fluid on abdomen.  Likely will need outpatient therapeutic paracentesis.  7/12. Culture pending. Afebrile. Improving.  7/11. Low suspicion for SBP. Ascitic tap per IR. PMR < 250 cell/mm3.  Patient afebrile.  Patient does have abdominal pain as such will start on rocephin 2g IV q8h as cefotaxime is non formulary.  [facility] cultures.  2. Ascites.   7/13. Clinically worse today. No pain. No evidence of SBP on culture. Okay for discharge home with outpatient follow up per GI. At this  time no biopsy desired per patient.  7/12. Stable today. GI consulted.  7/11. Secondary to #2. Fluid tap done per IR. 8.5 L of yellow fluid aspirated. Fluid studies ordered.  3. Cirrhosis.   7/13. No biopsy desired per family. Concern for sclerosing cholangitis. Likely would benefit from palliative care/hospice.  Discussed with family. Daughter considering options.  7/12. GI consulted.  Appreciate input.  7/11. Suspect advanced. GI consulted. Appreciate input.  4. Atrial fibrillation.   7/12. NSR.  7/11. First EKG suspicious for atrial fibrillation. Chart review shows patient spontaneously converted to NSR> Will hold off on anticoagulation secondary to ascitic tap and liver disease.  Will discuss with GI. Cardiac monitoring. Cardizem from home meds.  5. Elevated D-dimer.   7/12. CTA negative for central PE. Large left pleural effusion.  7/11. CTA ordered for am with pre medication with steroids. Anticoagulation on hold.    6. Deconditioning. PT/OT.  7. Large left pleural effusion.   7/13. Clinically no decompensation. Discussed that patient may need a PleurX catheter at some point, but he is currently on room air and asymptomatic.  No pleural tap secondary to patient's current desire to no have liver biopsy.  If this desire changes maybe worthwhile for tap with studies.  7/12. Patient afebrile, not tachypneic, normocardic, and saturating >95% on RA. Repeat x-ray tmw am.    Will monitor patient's hospital course and adjust treatment as hospital course dictates.    DVT prophylaxis: SCDs  Code status is   Code Status and Medical Interventions:   Ordered at: 07/11/18 1956     Level Of Support Discussed With:    Patient     Code Status:    CPR     Medical Interventions (Level of Support Prior to Arrest):    Full       Plan for disposition:Where: SNF and When:  today      Time:           This document has been electronically signed by Miguel Sharif MD on July 13, 2018 11:45 AM

## 2018-07-13 NOTE — DISCHARGE SUMMARY
DISCHARGE SUMMARY    NAME: Rodriguez Botello Jr.   PHYSICIAN: Miguel Sharif MD  : 1934  MRN: [id number]    ADMITTED: 2018   DISCHARGED: 18    ADMISSION DIAGNOSES:  Present on Admission:  • Atrial fibrillation (CMS/HCC)  • Hepatic cirrhosis (CMS/HCC)  • Other ascites  • Hyperlipidemia  • GERD (gastroesophageal reflux disease)  • Hypertension    DISCHARGE DIAGNOSES:   Principal Problem:    Atrial fibrillation (CMS/HCC)  Active Problems:    Hepatic cirrhosis (CMS/HCC)    Other ascites    Hyperlipidemia    GERD (gastroesophageal reflux disease)    Hypertension    SERVICE: Medicine. Attending Miguel Sharif MD    CONSULTS:   Consult Orders (all)     Start     Ordered    18 0740  Inpatient Case Management  Consult  Once     Provider:  (Not yet assigned)    18 0740    18 1950  Gastroenterology (on-call MD unless specified)  Once     Specialty:  Gastroenterology  Provider:  Carl Klein MD    18 1956    18 1614  Hospitalist (on-call MD unless specified)  Once     Specialty:  Hospitalist  Provider:  (Not yet assigned)    18 1613    18 1614  Gastroenterology (on-call MD unless specified)  Once,   Status:  Canceled     Specialty:  Gastroenterology  Provider:  (Not yet assigned)    18 1613          PROCEDURES:   Imaging Results (last 7 days)     Procedure Component Value Units Date/Time    XR Chest PA & Lateral [[id number]] Collected:  18 0724     Updated:  18 0750    Narrative:       Chest x-ray PA and lateral       CLINICAL INDICATION: Shortness of breath. Atrial fibrillation and  pleural effusion.    COMPARISON: Chest x-ray 2018. CT chest 2018.    FINDINGS: Borderline cardiomegaly. Left lower lobe infiltrative  changes either pneumonitis or atelectasis. Small left-sided  pleural effusion best appreciated posteriorly on the lateral  view. Left lower lobe infiltrative changes and small left  effusion have increased  since prior chest x-ray exam July 11, 2018, but are similar to findings on chest CT July 12, 2018.      Impression:       CONCLUSION: Left lower lobe infiltrative changes either  pneumonitis or atelectasis and small left-sided pleural effusion.    Electronically signed by:  Peewee Reeves MD  7/13/2018 7:49 AM CDT  Workstation: [id number]    CT Angiogram Chest With Contrast [[id number]] Collected:  07/12/18 0803     Updated:  07/12/18 0832    Narrative:       PROCEDURE: CTA chest with contrast PE protocol    COMPARISON: None    HISTORY: Shortness of breath.    TECHNIQUE: Multiple contiguous axial images of the chest are  obtained after the uneventful, intravenous administration of 69  mL Isovue 300 per PE protocol. Coronal and sagittal MIPS are also  created and reviewed.     This exam was performed according to our departmental  dose-optimization program, which includes automated exposure  control, adjustment of the mA and/or kV according to patient size  and/or use of iterative reconstruction technique.    FINDINGS:  Exam is limited by motion artifact. There is minimal bilateral  atelectasis. No focal areas of consolidation or suspicious  pulmonary nodules.    Heart size normal. There is a large left pleural effusion. No  right pleural effusion or pericardial effusion. No  lymphadenopathy in the chest by size criteria.    Limited evaluation of peripheral pulmonary arteries due to motion  artifact. No pulmonary embolism is identified. There is certainly  no large or central PE.    Cholelithiasis. Hepatic surface nodularity, consistent with  cirrhosis. Mild to moderate amount of ascites present in the  upper abdomen.    Multiple age-indeterminate compression deformities involving the  thoracic spine, most pronounced at T12.       Impression:       1. Limited evaluation for pulmonary embolism due to respiratory  motion artifact. No pulmonary embolism is identified. There is  certainly no large or central PE.  2. Large left  pleural effusion resulting in mild compressive  atelectasis of the left lower lobe.  3. Hepatic cirrhosis with sequela of portal hypertension  including a small to moderate amount of ascites in the upper  abdomen.  4. Cholelithiasis.  5. Age-indeterminate compression deformities in the thoracic  spine    Electronically signed by:  Shaquille Kearney MD  7/12/2018 8:31  AM CDT Workstation: [id number]     Paracentesis [[id number]] Collected:  07/11/18 1633    Specimen:  Body Fluid Updated:  07/11/18 1741    Narrative:       Procedure: Ultrasound paracentesis    Reason for exam: Ascites    FINDINGS: Patient presents for ultrasound directed paracentesis.  Procedure, risks, benefits were explained the patient he gave  informed written consent. Ultrasound was utilized to find  appropriate pocket for paracentesis. The skin surface was marked.  The patient was sterilely prepped and draped and locally  anesthetized with lidocaine. Utilizing ultrasound guidance, a  Yueh catheter was placed within the ascitic fluid. A total of 8.5  L of yellowish ascitic fluid was aspirated. The catheter was then  removed. Patient tolerated procedure well. No immediate  complications. In-house physician was notified of paracentesis  results.      Impression:       Successful ultrasound paracentesis with removal of  8.5 L of yellowish ascitic fluid.    Electronically signed by:  Faisal Chu MD  7/11/2018 5:40 PM CDT  Workstation: [id number]    XR Chest 1 View [[id number]] Collected:  07/11/18 1500     Updated:  07/11/18 1522    Narrative:         PROCEDURE: Single chest view AP    REASON FOR EXAM:abd pain    FINDINGS: Comparison exam dated May 4, 2015. Cardiac and  pulmonary vasculature are normal. Lungs are clear. Pleural spaces  are normal. No acute osseous abnormality.      Impression:       No acute cardiopulmonary abnormality.    Electronically signed by:  Faisal Chu MD  7/11/2018 3:20 PM CDT  Workstation: [id number]          HISTORY OF  PRESENT ILLNESS:   Rodriguez Botello Jr. is a 84 y.o. male who presents with abdominal pain from [facility].     Patient with known cirrhosis and ascites has had worsening ascites and abdominal pain.  As such he came to the ED today.  Patient denies any fever, chills, nausea, or vomiting today. Patient does endorse some anorexia with the increase in abdominal pain.        DIAGNOSTIC DATA:   Lab Results (last 7 days)     Procedure Component Value Units Date/Time    Basic Metabolic Panel [[id number]]  (Abnormal) Collected:  07/13/18 0522    Specimen:  Blood Updated:  07/13/18 0652     Glucose 137 (H) mg/dL      BUN 22 (H) mg/dL      Creatinine 0.77 mg/dL      Sodium 138 mmol/L      Potassium 3.7 mmol/L      Chloride 104 mmol/L      CO2 28.0 mmol/L      Calcium 8.2 (L) mg/dL      eGFR Non African Amer 96 mL/min/1.73      BUN/Creatinine Ratio 28.6 (H)     Anion Gap 6.0 mmol/L     Narrative:       The MDRD GFR formula is only valid for adults with stable renal function between ages 18 and 70.    Body Fluid Culture - Body Fluid, Peritoneum [[id number]]  (Normal) Collected:  07/11/18 1719    Specimen:  Body Fluid from Peritoneum Updated:  07/13/18 0634     BF Culture No growth at 2 days     Gram Stain Result Few (2+) WBCs seen      No organisms seen    CBC & Differential [[id number]] Collected:  07/13/18 0522    Specimen:  Blood Updated:  07/13/18 0633    Narrative:       The following orders were created for panel order CBC & Differential.  Procedure                               Abnormality         Status                     ---------                               -----------         ------                     CBC Auto Differential[[id number]]        Abnormal            Final result                 Please view results for these tests on the individual orders.    CBC Auto Differential [[id number]]  (Abnormal) Collected:  07/13/18 0522    Specimen:  Blood Updated:  07/13/18 0633     WBC 12.49 (H) 10*3/mm3      RBC  3.38 (L) 10*6/mm3      Hemoglobin 11.1 (L) g/dL      Hematocrit 31.7 (L) %      MCV 93.8 fL      MCH 32.8 pg      MCHC 35.0 g/dL      RDW 14.7 (H) %      RDW-SD 50.1 (H) fl      MPV 10.5 fL      Platelets 120 (L) 10*3/mm3      Neutrophil % 83.9 (H) %      Lymphocyte % 8.2 (L) %      Monocyte % 7.2 %      Eosinophil % 0.2 %      Basophil % 0.2 %      Immature Grans % 0.3 %      Neutrophils, Absolute 10.48 (H) 10*3/mm3      Lymphocytes, Absolute 1.02 10*3/mm3      Monocytes, Absolute 0.90 10*3/mm3      Eosinophils, Absolute 0.03 10*3/mm3      Basophils, Absolute 0.02 10*3/mm3      Immature Grans, Absolute 0.04 (H) 10*3/mm3     CRE Screen by PCR - Swab, Large Intestine, Rectum [[id number]] Collected:  07/11/18 2213    Specimen:  Swab from Large Intestine, Rectum Updated:  07/12/18 0759     CRE SCREEN Not Detected     Comment: Test performed by real-time polymerase chain reaction (qPCR).        OXA 48 Strain Not Detected     Comment: Not Applicable.        IMP STRAIN Not Detected     Comment: Not Applicable        VIM STRAING Not Detected     Comment: Not Applicable        NDM Strain Not Detected     Comment: Not Applicable        KPC Strain Not Detected     Comment: Not Applicable       Troponin [[id number]]  (Normal) Collected:  07/12/18 0401    Specimen:  Blood Updated:  07/12/18 0455     Troponin I <0.012 ng/mL     Basic Metabolic Panel [[id number]]  (Abnormal) Collected:  07/12/18 0401    Specimen:  Blood Updated:  07/12/18 0443     Glucose 189 (H) mg/dL      BUN 19 mg/dL      Creatinine 0.91 mg/dL      Sodium 137 mmol/L      Potassium 3.7 mmol/L      Chloride 103 mmol/L      CO2 28.0 mmol/L      Calcium 8.0 (L) mg/dL      eGFR Non African Amer 79 mL/min/1.73      BUN/Creatinine Ratio 20.9     Anion Gap 6.0 mmol/L     Narrative:       The MDRD GFR formula is only valid for adults with stable renal function between ages 18 and 70.    CBC & Differential [[id number]] Collected:  07/12/18 0401    Specimen:  Blood Updated:   07/12/18 0407    Narrative:       The following orders were created for panel order CBC & Differential.  Procedure                               Abnormality         Status                     ---------                               -----------         ------                     CBC Auto Differential[[id number]]        Abnormal            Final result                 Please view results for these tests on the individual orders.    CBC Auto Differential [[id number]]  (Abnormal) Collected:  07/12/18 0401    Specimen:  Blood Updated:  07/12/18 0407     WBC 4.83 10*3/mm3      RBC 3.22 (L) 10*6/mm3      Hemoglobin 10.6 (L) g/dL      Hematocrit 30.2 (L) %      MCV 93.8 fL      MCH 32.9 pg      MCHC 35.1 g/dL      RDW 14.4 %      RDW-SD 49.7 (H) fl      MPV 10.2 fL      Platelets 105 (L) 10*3/mm3      Neutrophil % 89.4 (H) %      Lymphocyte % 8.1 (L) %      Monocyte % 2.1 %      Eosinophil % 0.0 %      Basophil % 0.2 %      Immature Grans % 0.2 %      Neutrophils, Absolute 4.32 10*3/mm3      Lymphocytes, Absolute 0.39 (L) 10*3/mm3      Monocytes, Absolute 0.10 10*3/mm3      Eosinophils, Absolute 0.00 10*3/mm3      Basophils, Absolute 0.01 10*3/mm3      Immature Grans, Absolute 0.01 10*3/mm3     Troponin [[id number]]  (Normal) Collected:  07/11/18 2053    Specimen:  Blood Updated:  07/11/18 2144     Troponin I <0.012 ng/mL     Body Fluid Cell Count With Differential - Body Fluid, Peritoneum [[id number]] Collected:  07/11/18 1719    Specimen:  Body Fluid from Peritoneum Updated:  07/11/18 2118    Narrative:       The following orders were created for panel order Body Fluid Cell Count With Differential - Body Fluid, Peritoneum.  Procedure                               Abnormality         Status                     ---------                               -----------         ------                     Body fluid cell count - ...[[id number]]  Abnormal            Final result               Body fluid differential ...[[id number]]                       Final result                 Please view results for these tests on the individual orders.    Body fluid differential - Body Fluid, [[id number]] Collected:  07/11/18 1719    Specimen:  Body Fluid from Peritoneum Updated:  07/11/18 2118     Neutrophils, Fluid 5 %      Mononuclear, Fluid 95 %     Body fluid cell count - Body Fluid, [[id number]]  (Abnormal) Collected:  07/11/18 1719    Specimen:  Body Fluid from Peritoneum Updated:  07/11/18 1945     WBC, Fluid 146 (H) /mm3      RBC, Fluid 346.5 (H) /mm3      Color, Fluid Yellow     Appearance, Fluid Slightly Cloudy (A)     Volume, Fluid 8,500.0 mL     Lactic Acid, Reflex [[id number]]  (Normal) Collected:  07/11/18 1854    Specimen:  Blood Updated:  07/11/18 1944     Lactate 1.6 mmol/L     Lactic Acid, Reflex Timer (This will reflex a repeat order 3-3:15 hours after ordered.) [[id number]] Collected:  07/11/18 1441    Specimen:  Blood Updated:  07/11/18 1845     Extra Tube Hold for add-ons.     Comment: Auto resulted.       Albumin, Fluid - Body Fluid, Peritoneum [[id number]] Collected:  07/11/18 1719    Specimen:  Body Fluid from Peritoneum Updated:  07/11/18 1816     Albumin, Fluid <1.00 g/dL     Amylase, Body Fluid - Body Fluid, Peritoneum [[id number]] Collected:  07/11/18 1719    Specimen:  Body Fluid from Peritoneum Updated:  07/11/18 1816     Amylase, Fluid <30 U/L     Protein, Body Fluid - Body Fluid, Peritoneum [[id number]] Collected:  07/11/18 1719    Specimen:  Body Fluid from Peritoneum Updated:  07/11/18 1815     Protein, Total, Fluid <2.0 g/dL     Troponin [[id number]]  (Normal) Collected:  07/11/18 1619    Specimen:  Blood Updated:  07/11/18 1658     Troponin I <0.012 ng/mL     TSH+Free T4 [[id number]]  (Normal) Collected:  07/11/18 1441    Specimen:  Blood Updated:  07/11/18 1634     TSH 3.010 mIU/mL      Free T4 1.58 ng/dL     Urinalysis With Microscopic If Indicated (No Culture) - Urine, Clean Catch [[id number]]  (Abnormal) Collected:  07/11/18 1552     Specimen:  Urine from Urine, Clean Catch Updated:  07/11/18 1609     Color, UA Yellow     Appearance, UA Clear     pH, UA 6.0     Specific Gravity, UA 1.012     Glucose, UA Negative     Ketones, UA Negative     Bilirubin, UA Negative     Blood, UA Negative     Protein, UA Negative     Leuk Esterase, UA Negative     Nitrite, UA Negative     Urobilinogen, UA 2.0 E.U./dL (A)    Narrative:       Urine microscopic not indicated.    D-dimer, Quantitative [[id number]]  (Abnormal) Collected:  07/11/18 1441    Specimen:  Blood Updated:  07/11/18 1548     D-Dimer, Quantitative >4,000 (H) ng/mL (FEU)     Narrative:       Dimer values <500 ng/ml FEU are FDA approved as aid in diagnosis of deep venous thrombosis and pulmonary embolism.  This test should not be used in an exclusion strategy with pretest probability alone.    A recent guideline regarding diagnosis for pulmonary thromboembolism recommends an adjusted exclusion criterion of age x 10 ng/ml FEU for patients >50 years of age ([employer] Intern Med 2015; 163: 701-711).    Protime-INR [[id number]]  (Abnormal) Collected:  07/11/18 1441    Specimen:  Blood Updated:  07/11/18 1548     Protime 15.8 (H) Seconds      INR 1.30 (H)    Narrative:       Therapeutic range for most indications is 2.0-3.0 INR,  or 2.5-3.5 for mechanical heart valves.    aPTT [[id number]]  (Normal) Collected:  07/11/18 1441    Specimen:  Blood Updated:  07/11/18 1548     PTT 30.9 seconds     Narrative:       The recommended Heparin therapeutic range is 68-97 seconds.    Extra Tubes [[id number]] Collected:  07/11/18 1441    Specimen:  Blood from Blood, Venous Line Updated:  07/11/18 4644    Narrative:       The following orders were created for panel order Extra Tubes.  Procedure                               Abnormality         Status                     ---------                               -----------         ------                     Gold [facility] - [facility][[id number]]                                   Final  result                 Please view results for these tests on the individual orders.    Gold Top - SST [[id number]] Collected:  07/11/18 1441    Specimen:  Blood Updated:  07/11/18 1545     Extra Tube Hold for add-ons.     Comment: Auto resulted.       Lactic Acid, Plasma [[id number]]  (Abnormal) Collected:  07/11/18 1441    Specimen:  Blood Updated:  07/11/18 1533     Lactate 2.5 (C) mmol/L     CK-MB [[id number]]  (Normal) Collected:  07/11/18 1441    Specimen:  Blood Updated:  07/11/18 1533     CKMB 0.82 ng/mL     CK [[id number]]  (Abnormal) Collected:  07/11/18 1441    Specimen:  Blood Updated:  07/11/18 1524     Creatine Kinase 37 (L) U/L     Magnesium [[id number]]  (Normal) Collected:  07/11/18 1441    Specimen:  Blood Updated:  07/11/18 1524     Magnesium 2.0 mg/dL     Comprehensive Metabolic Panel [[id number]]  (Abnormal) Collected:  07/11/18 1441    Specimen:  Blood Updated:  07/11/18 1522     Glucose 172 (H) mg/dL      BUN 15 mg/dL      Creatinine 0.93 mg/dL      Sodium 140 mmol/L      Potassium 3.3 (L) mmol/L      Chloride 102 mmol/L      CO2 30.0 mmol/L      Calcium 7.9 (L) mg/dL      Total Protein 6.8 g/dL      Albumin 2.70 (L) g/dL      ALT (SGPT) 33 U/L      AST (SGOT) 41 U/L      Alkaline Phosphatase 248 (H) U/L      Total Bilirubin 1.0 mg/dL      eGFR Non African Amer 77 mL/min/1.73      Globulin 4.1 (H) gm/dL      A/G Ratio 0.7 (L) g/dL      BUN/Creatinine Ratio 16.1     Anion Gap 8.0 mmol/L     Narrative:       The MDRD GFR formula is only valid for adults with stable renal function between ages 18 and 70.    Amylase [[id number]]  (Normal) Collected:  07/11/18 1441    Specimen:  Blood Updated:  07/11/18 1522     Amylase 69 U/L     Lipase [[id number]]  (Abnormal) Collected:  07/11/18 1441    Specimen:  Blood Updated:  07/11/18 1522     Lipase 526 (H) U/L     Ammonia [[id number]]  (Normal) Collected:  07/11/18 1441    Specimen:  Blood Updated:  07/11/18 1522     Ammonia 22 umol/L     CBC & Differential  [[id number]] Collected:  07/11/18 1441    Specimen:  Blood Updated:  07/11/18 1503    Narrative:       The following orders were created for panel order CBC & Differential.  Procedure                               Abnormality         Status                     ---------                               -----------         ------                     CBC Auto Differential[[id number]]        Abnormal            Final result                 Please view results for these tests on the individual orders.    CBC Auto Differential [[id number]]  (Abnormal) Collected:  07/11/18 1441    Specimen:  Blood Updated:  07/11/18 1503     WBC 5.99 10*3/mm3      RBC 3.67 (L) 10*6/mm3      Hemoglobin 12.1 (L) g/dL      Hematocrit 35.0 (L) %      MCV 95.4 fL      MCH 33.0 pg      MCHC 34.6 g/dL      RDW 14.7 (H) %      RDW-SD 51.8 (H) fl      MPV 9.9 fL      Platelets 115 (L) 10*3/mm3      Neutrophil % 66.4 %      Lymphocyte % 15.0 %      Monocyte % 12.9 (H) %      Eosinophil % 4.3 %      Basophil % 1.2 %      Immature Grans % 0.2 %      Neutrophils, Absolute 3.98 10*3/mm3      Lymphocytes, Absolute 0.90 10*3/mm3      Monocytes, Absolute 0.77 10*3/mm3      Eosinophils, Absolute 0.26 10*3/mm3      Basophils, Absolute 0.07 10*3/mm3      Immature Grans, Absolute 0.01 10*3/mm3           HOSPITAL COURSE:  Active Hospital Problems    Diagnosis Date Noted   • **Atrial fibrillation (CMS/HCC) [I48.91] 07/11/2018   • Hyperlipidemia [E78.5] 07/11/2018   • GERD (gastroesophageal reflux disease) [K21.9] 07/11/2018   • Hypertension [I10] 07/11/2018   • Other ascites [R18.8] 07/10/2018   • Hepatic cirrhosis (CMS/HCC) [K74.60] 05/09/2017       Patient admitted with possible afib, ascites, and abdominal pain.  Patient had 8.5L of fluid drained from his abdomen and studies ordered on this fluid.  He was prophylactically started on 2g rocephin IV q24h for SBP even though low suspicion of SBP. Patient improved nicely after the paracentesis, but underlying cause  still remains.  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  Patient was not interested in biopsy, but he was interested in possible therapeutic paracentesis as needed.  Patient also found to have large pleural effusion likely related to his cirrhosis as well.  He was not decompensated as such no pleural tap performed.  If anything patient likely at some point to benefit from a pleurX catheter for drainage as needed. Patient had and elevated D-dimer and he underwent a CTA chest PE protocol.  No centrally located PE on CTA.  Compressive atelectasis vs infiltrate from imaging.  Patient somewhat dependent, as such will complete course of antibiotics for possible CAP. Doxycyline for atypicals and rocephin. Patient improved to near baseline and desired to return to the SNF.  He would likely benefit from palliative care vs hospice.  Initiated this discussion with patient's daughter, but she was not ready to make any final decisions at this time.  Close follow up with GI for therapeutic paracentesis as needed.  No anticoagulation for possible afib at this time.  Patient remained in NSR after initial concern.  Has-Bled score is 3 and ChadsVAS score is 3.  ASA at this time.     1. Abdominal pain.   7/13. No pain today.  Some increase in fluid on abdomen.  Likely will need outpatient therapeutic paracentesis.  7/12. Culture pending. Afebrile. Improving.  7/11. Low suspicion for SBP. Ascitic tap per IR. PMR < 250 cell/mm3.  Patient afebrile.  Patient does have abdominal pain as such will start on rocephin 2g IV q8h as cefotaxime is non formulary.  Ankit cultures.  2. Ascites.   7/13. Clinically worse today. No pain. No evidence of SBP on culture. Okay for discharge home with outpatient follow up per GI. At this time no biopsy desired per patient.  7/12. Stable today. GI consulted.  7/11. Secondary to #2. Fluid tap done per IR. 8.5 L of yellow fluid aspirated. Fluid studies ordered.  3. Cirrhosis.    7/13. No biopsy desired per family. Concern for sclerosing cholangitis. Likely would benefit from palliative care/hospice.  Discussed with family. Daughter considering options.  7/12. GI consulted.  Appreciate input.  7/11. Suspect advanced. GI consulted. Appreciate input.  4. Atrial fibrillation.   7/12. NSR.  7/11. First EKG suspicious for atrial fibrillation. Chart review shows patient spontaneously converted to NSR> Will hold off on anticoagulation secondary to ascitic tap and liver disease.  Will discuss with GI. Cardiac monitoring. Cardizem from home meds.  5. Elevated D-dimer.   7/12. CTA negative for central PE. Large left pleural effusion.  7/11. CTA ordered for am with pre medication with steroids. Anticoagulation on hold.    6. Deconditioning. PT/OT.  7. Large left pleural effusion.   7/13. Clinically no decompensation. Discussed that patient may need a PleurX catheter at some point, but he is currently on room air and asymptomatic.  No pleural tap secondary to patient's current desire to no have liver biopsy.  If this desire changes maybe worthwhile for tap with studies.  7/12. Patient afebrile, not tachypneic, normocardic, and saturating >95% on RA. Repeat x-ray tmw am.    DISCHARGE CONDITION:   Stable    DISPOSITION:  Skilled Nursing Facility (DC - External)    DISCHARGE MEDICATIONS     Discharge Medications      New Medications      Instructions Start Date   cefTRIAXone  Commonly known as:  ROCEPHIN   2 g, Intravenous, Every 24 Hours      doxycycline 100 MG capsule  Commonly known as:  MONODOX   100 mg, Oral, 2 Times Daily      spironolactone 50 MG tablet  Commonly known as:  ALDACTONE   50 mg, Oral, Daily         Continue These Medications      Instructions Start Date   acetaminophen 500 MG tablet  Commonly known as:  TYLENOL   500 mg, Oral, Every 4 Hours PRN      aspirin 325 MG tablet   325 mg, Oral, Daily      busPIRone 5 MG tablet  Commonly known as:  BUSPAR   5 mg, Oral, 3 Times Daily       diltiaZEM  MG 24 hr capsule  Commonly known as:  CARDIZEM CD   120 mg, Oral, Daily, Every Morning Before Meal For Heart Or To Lower Blood Pressure       famotidine 20 MG tablet  Commonly known as:  PEPCID   20 mg, Oral, Every Morning      fluticasone 50 MCG/ACT nasal spray  Commonly known as:  FLONASE   2 sprays, Nasal, Daily      furosemide 40 MG tablet  Commonly known as:  LASIX   40 mg, Oral, Every Morning      HYDROcodone-acetaminophen 5-325 MG per tablet  Commonly known as:  NORCO   1 tablet, Oral, Every 8 Hours PRN      PARoxetine 10 MG tablet  Commonly known as:  PAXIL   10 mg, Oral, Every Morning      risperiDONE 3 MG tablet  Commonly known as:  risperDAL   3 mg, Oral, Nightly         Stop These Medications    potassium chloride 20 MEQ CR tablet  Commonly known as:  K-DUR,KLOR-CON            INSTRUCTIONS:  Activity:   Activity Instructions     Activity as Tolerated       PT/OT at SNF.          Diet:   Diet Instructions     Advance Diet As Tolerated             Special instructions: Patient instructed to call MD or return to ED with worsening shortness of breath, chest pain, fever greater than 100.4 degrees F or any other medical concerns..    FOLLOW UP:   Follow-up Information     Carl Hanks MD Follow up.    Specialty:  Family Medicine  Why:  Once back to SNF.  Contact information:  [address] [address]  [phone]             Carl Klein MD. Schedule an appointment as soon as possible for a visit in 10 day(s).    Specialty:  Gastroenterology  Why:  Hospital F/U. May need therapeutic paracentesis.  Contact information:  [address] [place] [address] [address]  [place] [id number] [phone]                   PENDING TEST RESULTS AT DISCHARGE   Order Current Status    Body Fluid Culture - Body Fluid, Peritoneum Preliminary result          Time: Discharge 30 min          This document has been electronically signed by Miguel Sharif MD on July 13, 2018 3:18 PM

## 2018-07-13 NOTE — THERAPY DISCHARGE NOTE
Acute Care - Physical Therapy Discharge Summary  [facility]       Patient Name: Rodriguez Botello Jr.  : 1934  MRN: [id number]    Today's Date: 2018  Onset of Illness/Injury or Date of Surgery: 18    Date of Referral to PT: 18  Referring Physician: Itzel Sharif      Admit Date: 2018      PT Recommendation and Plan    Visit Dx:    ICD-10-CM ICD-9-CM   1. Atrial fibrillation, unspecified type (CMS/HCC) I48.91 427.31   2. Dyspnea, unspecified type R06.00 786.09   3. Cirrhosis of liver with ascites, unspecified hepatic cirrhosis type (CMS/HCC) K74.60 571.5   4. Other ascites R18.8 789.59   5. Impaired functional mobility, balance, gait, and endurance Z74.09 V49.89   6. Impaired mobility and ADLs Z74.09 799.89             Outcome Measures     Row Name 18 1103 18 0840          How much help from another person do you currently need...    Turning from your back to your side while in flat bed without using bedrails? 3  -SEBASTIÁN  --     Moving from lying on back to sitting on the side of a flat bed without bedrails? 3  -SEBASTIÁN  --     Moving to and from a bed to a chair (including a wheelchair)? 3  -SEBASTIÁN  --     Standing up from a chair using your arms (e.g., wheelchair, bedside chair)? 3  -SEBASTIÁN  --     Climbing 3-5 steps with a railing? 3  -SEBASTIÁN  --     To walk in hospital room? 3  -SEBASTIÁN  --     AM-PAC 6 Clicks Score 18  -SEBASTINÁ  --        How much help from another is currently needed...    Putting on and taking off regular lower body clothing?  -- 2  -BH     Bathing (including washing, rinsing, and drying)  -- 2  -BH     Toileting (which includes using toilet bed pan or urinal)  -- 3  -BH     Putting on and taking off regular upper body clothing  -- 3  -BH     Taking care of personal grooming (such as brushing teeth)  -- 3  -BH     Eating meals  -- 4  -BH     Score  -- 17  -        Functional Assessment    Outcome Measure Options AM-PAC 6 Clicks Basic Mobility (PT)  -SEBASTIÁN AM-PAC 6 Clicks Daily  Activity (OT)  -       User Key  (r) = Recorded By, (t) = Taken By, (c) = Cosigned By    Initials Name Provider Type     Erika Rivera, OTR/L Occupational Therapist    SEBASTIÁN Pantoja, PT Physical Therapist                PT Charges     Row Name 07/13/18 1406             Time Calculation    Start Time 1103  -SEBASTIÁN      Stop Time 1131  -SEBASTIÁN      Time Calculation (min) 28 min  -SEBASTIÁN      PT Received On 07/13/18  -      PT Goal Re-Cert Due Date 07/26/18  -SEBASTIÁN         Time Calculation- PT    Total Timed Code Minutes- PT 13 minute(s)  -SEBASTIÁN        User Key  (r) = Recorded By, (t) = Taken By, (c) = Cosigned By    Initials Name Provider Type    SEBASTIÁN Pantoja PT Physical Therapist        Therapy Suggested Charges     Code   Minutes Charges    None                   PT Rehab Goals     Row Name 07/13/18 1103             Transfer Goal 1 (PT)    Activity/Assistive Device (Transfer Goal 1, PT) sit-to-stand/stand-to-sit;bed-to-chair/chair-to-bed  -SEBASTIÁN      [place] Level/Cues Needed (Transfer Goal 1, PT) supervision required  -SEBASTIÁN      Time Frame (Transfer Goal 1, PT) by discharge  -SEBASTIÁN      Progress/Outcome (Transfer Goal 1, PT) goal not met  -SEBASTIÁN         Gait Training Goal 1 (PT)    Activity/Assistive Device (Gait Training Goal 1, PT) gait (walking locomotion);assistive device use  -SEBASTIÁN      [place] Level (Gait Training Goal 1, PT) supervision required  -SEBASTIÁN      Distance (Gait Goal 1, PT) 150ft  -SEBASTIÁN      Time Frame (Gait Training Goal 1, PT) by discharge  -SEBASTIÁN      Progress/Outcome (Gait Training Goal 1, PT) goal not met  -SEBASTIÁN         Strength Goal 1 (PT)    Strength Goal 1 (PT) Pt will tolerate 20 reps of AROM exercises OOB in sitting with VSS  -SEBASTIÁN      Time Frame (Strength Goal 1, PT) by discharge  -SEBASTIÁN      Progress/Outcome (Strength Goal 1, PT) goal not met  -SEBASTIÁN        User Key  (r) = Recorded By, (t) = Taken By, (c) = Cosigned By    Initials Name Provider Type [facility]    SEBASTIÁN Pantoja, PT Physical Therapist PT           Therapy Charges for Today     Code Description Service Date Service Provider Modifiers Qty    14359981260 HC PT MOBILITY CURRENT 7/13/2018 Jaye Pantoja, PT GP, CK 1    94416698486 HC PT MOBILITY PROJECTED 7/13/2018 Jaye Pantoja, PT GP, CJ 1    48862985922 HC PT EVAL MOD COMPLEXITY 1 7/13/2018 Jaye Pantoja, PT GP 1    12562508540 HC GAIT TRAINING EA 15 MIN 7/13/2018 Jaye Pantoja, PT GP 1          PT Discharge Summary  Anticipated Discharge Disposition (PT): skilled nursing facility  Reason for Discharge: Discharge from facility, Per MD order  Outcomes Achieved: Discharge from facility occurred on same date as evluation  Discharge Destination: SNF      Jaye Pantoja, PT   7/13/2018

## 2018-07-13 NOTE — THERAPY EVALUATION
Acute Care - Occupational Therapy Initial Evaluation  [facility]     Patient Name: Rodriguez Botello Jr.  : 1934  MRN: [id number]  Today's Date: 2018  Onset of Illness/Injury or Date of Surgery: 18  Date of Referral to OT: 18  Referring Physician: Itzel Sharif    Admit Date: 2018       ICD-10-CM ICD-9-CM   1. Atrial fibrillation, unspecified type (CMS/HCC) I48.91 427.31   2. Dyspnea, unspecified type R06.00 786.09   3. Cirrhosis of liver with ascites, unspecified hepatic cirrhosis type (CMS/HCC) K74.60 571.5   4. Other ascites R18.8 789.59   5. Impaired functional mobility, balance, gait, and endurance Z74.09 V49.89   6. Impaired mobility and ADLs Z74.09 799.89     Patient Active Problem List   Diagnosis   • Hepatic cirrhosis (CMS/HCC)   • Other ascites   • Atrial fibrillation (CMS/HCC)   • Hyperlipidemia   • GERD (gastroesophageal reflux disease)   • Hypertension     Past Medical History:   Diagnosis Date   • GERD (gastroesophageal reflux disease)    • History of echocardiogram 2015    Echocardiogram W/ color flow 12247 (Low normal LV funciton with Ef of 50-55%. Normal RV size and funciton. Grade 1A diastolic dysfunction. No indication of left ventricular or left atrial thrombus. No sig. valvular regurg or stenosis.)   • Hyperlipidemia    • Hypertension      Past Surgical History:   Procedure Laterality Date   • BACK SURGERY      Approximatly 20 Years Prior To [facility] Clinical Appointment Dated 2017 In [facility]          OT ASSESSMENT FLOWSHEET (last 72 hours)      Occupational Therapy Evaluation     Row Name 18 0840                   OT Evaluation Time/Intention    Document Type evaluation  -        Mode of Treatment individual therapy;occupational therapy  -BH        Total Evaluation Minutes, Occupational Therapy 55  -BH        Patient Effort good  -BH        Symptoms Noted During/After Treatment fatigue  -        Comment Pt fatigued with tasks, gets  SOB  -           General Information    Patient Profile Reviewed? yes  -        Onset of Illness/Injury or Date of Surgery 07/11/18  -        Referring Physician Dr. Miguel Sharif  -        Patient Observations alert;cooperative;agree to therapy  -        Patient/Family Observations no family present  -        General Observations of Patient pt supine HOB up on room air, tele, IV,   -        Prior Level of Function independent:;all household mobility;transfer;ADL's   reports sometimes son helps with ADL  -        Equipment Currently Used at Home wheelchair;walker, standard;shower chair   unable to get clear answer inconsistent  -        Pertinent History of Current Functional Problem Pt admitted to [facility] with a-fib, with h/o liver cirrhois; pt underwent paracentesis 7/11/18  -        Existing Precautions/Restrictions fall  -        Limitations/Impairments safety/cognitive;hearing  -        Risks Reviewed patient:;LOB;increased discomfort;change in vital signs;dizziness  -        Benefits Reviewed patient:;improve function;increase independence;increase strength;increase balance  -           Relationship/Environment    Lives With facility resident  -           Resource/Environmental Concerns    Current Living Arrangements extended care facility   SNF  -           Cognitive Assessment/Interventions    Additional Documentation Cognitive Assessment/Intervention (Group)  -           Cognitive Assessment/Intervention- PT/OT    Affect/Mental Status (Cognitive) confused   pleasant  -        Orientation Status (Cognition) oriented to;person;place;situation   knew month not year  -        Follows Commands (Cognition) 75-90% accuracy;verbal cues/prompting required;repetition of directions required;physical/tactile prompts required  -        Safety Deficit (Cognitive) moderate deficit  -        Personal Safety Interventions fall prevention program maintained;gait belt;nonskid  shoes/slippers when out of bed;supervised activity  -           Safety Issues, Functional Mobility    Safety Issues Affecting Function (Mobility) ability to follow commands;awareness of need for assistance;insight into deficits/self awareness;positioning of assistive device;judgment;problem solving;safety precaution awareness;safety precautions follow-through/compliance;sequencing abilities  -        Impairments Affecting Function (Mobility) balance;cognition;coordination;endurance/activity tolerance;strength;shortness of breath;pain  -        Comment, Safety Issues/Impairments (Mobility) Pt struggled with safety with the RW in tight places  -           Bed Mobility Assessment/Treatment    Bed Mobility Assessment/Treatment supine-sit;sit-supine  -        Supine-Sit [place] (Bed Mobility) contact guard;verbal cues  -        Sit-Supine [place] (Bed Mobility) contact guard;verbal cues  [facility]        Bed Mobility, Safety Issues decreased use of arms for pushing/pulling;decreased use of legs for bridging/pushing;impaired trunk control for bed mobility  -           Functional Mobility    Functional Mobility- Ind. Level contact guard assist  -        Functional Mobility- Device rolling walker  -           Transfer Assessment/Treatment    Transfer Assessment/Treatment sit-stand transfer;stand-sit transfer;toilet transfer  -           Sit-Stand Transfer    Sit-Stand [place] (Transfers) contact guard;verbal cues;nonverbal cues (demo/gesture)   hand placement  -        Assistive Device (Sit-Stand Transfers) walker, front-wheeled  [facility]           Stand-Sit Transfer    Stand-Sit [place] (Transfers) verbal cues;nonverbal cues (demo/gesture);contact guard   hand placement  -        Assistive Device (Stand-Sit Transfers) walker, front-wheeled  -           Toilet Transfer    Type (Toilet Transfer) sit-stand;stand-sit  -        [place] Level (Toilet Transfer) verbal cues;nonverbal cues  (demo/gesture);contact guard   hand placement  -        Assistive Device (Toilet Transfer) walker, front-wheeled;grab bars/safety frame  -           ADL Assessment/Intervention    BADL Assessment/Intervention lower body dressing;upper body dressing;bathing  -           Bathing Assessment/Intervention    Bathing [place] Level bathing skills;minimum assist (75% patient effort);moderate assist (50% patient effort)  -        Comment (Bathing) min assist with front UE, dependent for back, CGA for pericare with redireciton seated set up and close SBA for LE did not wash feet. pt required extended time and effort.   -           Upper Body Dressing Assessment/Training    Upper Body Dressing [place] Level doff;don;set up;minimum assist (75% patient effort)   hopsital gown  -        Upper Body Dressing Position edge of bed sitting  -           Lower Body Dressing Assessment/Training    Lower Body Dressing [place] Level doff;don;socks;moderate assist (50% patient effort)  -        Comment (Lower Body Dressing) pt doff socks in supine with mod difficulty, max difficulty mello asist to get over feet then to straighten sock  -           BADL Safety/Performance    Impairments, BADL Safety/Performance balance;endurance/activity tolerance;pain;shortness of breath;strength;trunk/postural control;coordination  -           General ROM    GENERAL ROM COMMENTS BUE WFL not full range  -           General Assessment (Manual Muscle Testing)    General Manual Muscle Testing (MMT) Assessment upper extremity strength deficits identified;lower extremity strength deficits identified  -           Upper Extremity (Manual Muscle Testing)    Comment, MMT: Upper Extremity BUE  grossly 4-/5; BUE grossly 3+ to 4-/5  -           Lower Extremity (Manual Muscle Testing)    Comment, MMT: Lower Extremity please see PT  -           Motor Assessment/Interventions    Additional Documentation Balance (Group)  -     "       Balance    Balance static sitting balance;dynamic sitting balance;dynamic standing balance;static standing balance  -           Static Sitting Balance    Level of [place] (Unsupported Sitting, Static Balance) standby assist;supervision  -           Dynamic Sitting Balance    Level of [place], Reaches Outside Midline (Sitting, Dynamic Balance) standby assist;contact guard assist  -           Static Standing Balance    Level of [place] (Supported Standing, Static Balance) contact guard assist  -           Dynamic Standing Balance    Level of [place], Reaches Outside Midline (Standing, Dynamic Balance) minimal assist, 75% patient effort;contact guard assist  -           Sensory Assessment/Intervention    Additional Documentation Hearing Assessment (Group);Vision Assessment/Intervention (Group)  -           Light Touch Sensation Assessment    Left Upper Extremity: Light Touch Sensation Assessment intact   reports numbness/tingling in LUE  -        Right Upper Extremity: Light Touch Sensation Assessment intact  -           Hearing Assessment    Hearing Status hearing impairment, bilaterally  -           Vision Assessment/Intervention    Visual Impairment/Limitations --   glasses \"parttime\"  -           Positioning and Restraints    Pre-Treatment Position in bed  -        Post Treatment Position bed  -        In Bed supine;fowlers;call light within reach;encouraged to call for assist;exit alarm on  -           Pain Assessment    Additional Documentation Pain Scale: Numbers Pre/Post-Treatment (Group)  -           Pain Scale: Numbers Pre/Post-Treatment    Pain Scale: Numbers, Pretreatment 6/10  -        Pain Scale: Numbers, Post-Treatment 7/10  -        Pain Location abdomen  -        Pain Intervention(s) Repositioned;Ambulation/increased activity   RN notified of needs for meds  -           Wound 07/11/18 1950 medial coccyx MASD (moisture associated skin " damage);skin tear    Wound - Properties Group Date first assessed: 07/11/18  -AC Time first assessed: 1950  -AC Present On Admission : yes;picture taken  -AC Orientation: medial  -AC Location: coccyx  -AC Type: MASD (moisture associated skin damage);skin tear  -AC       Plan of Care Review    Plan of Care Reviewed With patient  -           Clinical Impression (OT)    Date of Referral to OT 07/12/18  -        OT Diagnosis Impaired mobility and ADL  -        Prognosis (OT Eval) fair  -        Functional Level at Time of Evaluation (OT Eval) Pt cooperative and engaged in tasks but weak and assist needed.   -        Patient/Family Goals Statement (OT Eval) pt stated he is going back to the SNF  -        Criteria for Skilled Therapeutic Interventions Met (OT Eval) yes;treatment indicated  -        Rehab Potential (OT Eval) good, to achieve stated therapy goals  -        Therapy Frequency (OT Eval) other (see comments)   5-7 days a week  -        Predicted Duration of Therapy Intervention (Therapy Eval) until d/c  -        Care Plan Review (OT) evaluation/treatment results reviewed;care plan/treatment goals reviewed;risks/benefits reviewed;patient/other agree to care plan  -        Anticipated Discharge Disposition (OT) skilled nursing facility  -           Vital Signs    Pre Systolic BP Rehab 122  -BH        Pre Treatment Diastolic BP 58  -BH        Post Systolic BP Rehab 100  -BH        Post Treatment Diastolic BP 60  -BH        Pretreatment Heart Rate (beats/min) 75  -BH        Posttreatment Heart Rate (beats/min) 57  -BH        Pre SpO2 (%) 95  -BH        O2 Delivery Pre Treatment room air  -BH        Post SpO2 (%) 97  -BH        O2 Delivery Post Treatment room air  -BH        Pre Patient Position Supine  -BH        Post Patient Position Supine  -BH           Planned OT Interventions    Planned Therapy Interventions (OT Eval) activity tolerance training;adaptive equipment training;BADL  retraining;functional balance retraining;occupation/activity based interventions;passive ROM/stretching;patient/caregiver education/training;ROM/therapeutic exercise;strengthening exercise;transfer/mobility retraining  -           OT Goals    Transfer Goal Selection (OT) transfer, OT goal 1  -        Bathing Goal Selection (OT) bathing, OT goal 1  -        Dressing Goal Selection (OT) dressing, OT goal 1  -        Toileting Goal Selection (OT) toileting, OT goal 1  -        Activity Tolerance Goal Selection (OT) activity tolerance, OT goal 1  -        Additional Documentation Activity Tolerance Goal Selection (OT) (Row)  -           Transfer Goal 1 (OT)    Activity/Assistive Device (Transfer Goal 1, OT) toilet  -        [place] Level/Cues Needed (Transfer Goal 1, OT) supervision required  -        Time Frame (Transfer Goal 1, OT) long term goal (LTG);by discharge  -        Progress/Outcome (Transfer Goal 1, OT) goal not met  -           Bathing Goal 1 (OT)    Activity/Assistive Device (Bathing Goal 1, OT) bathing skills, all  -        [place] Level/Cues Needed (Bathing Goal 1, OT) set-up required;supervision required  -        Time Frame (Bathing Goal 1, OT) long term goal (LTG);by discharge  -        Progress/Outcomes (Bathing Goal 1, OT) goal not met  -           Dressing Goal 1 (OT)    Activity/Assistive Device (Dressing Goal 1, OT) dressing skills, all  -        [place]/Cues Needed (Dressing Goal 1, OT) set-up required;supervision required  -        Time Frame (Dressing Goal 1, OT) long term goal (LTG);by discharge  -        Progress/Outcome (Dressing Goal 1, OT) goal not met  -           Toileting Goal 1 (OT)    Activity/Device (Toileting Goal 1, OT) toileting skills, all  -        [place] Level/Cues Needed (Toileting Goal 1, OT) supervision required;set-up required  -        Time Frame (Toileting Goal 1, OT) long term goal (LTG);by discharge  -         Progress/Outcome (Toileting Goal 1, OT) goal not met  -            Activity Tolerance Goal 1 (OT)    Activity Level (Endurance Goal 1, OT) 20 min activity;O2 sat >/ equal to 88%   4 or less rest breaks  -        Time Frame (Activity Tolerance Goal 1, OT) long term goal (LTG);by discharge  -        Progress/Outcome (Activity Tolerance Goal 1, OT) goal not met  -           Living Environment    Home Accessibility --   unable to get clear answers possible tub/shower  -          User Key  (r) = Recorded By, (t) = Taken By, (c) = Cosigned By    Initials Name Effective Dates     Erika Rivera OTR/L 06/08/18 -     AC Izzy Villanueva RN 10/17/16 -            Occupational Therapy Education     Title: PT OT SLP Therapies (Active)     Topic: Occupational Therapy (Active)     Point: ADL training (Done)     Description: Instruct learner(s) on proper safety adaptation and remediation techniques during self care or transfers.   Instruct in proper use of assistive devices.   Learning Progress Summary     Learner Status Readiness Method Response Comment Documented by    Patient Done Acceptance E VU,NR Educated pt about OT and POC. Educated to call for assist. Educated on safety throughout.  07/13/18 1425          Point: Precautions (Done)     Description: Instruct learner(s) on prescribed precautions during self-care and functional transfers.   Learning Progress Summary     Learner Status Readiness Method Response Comment Documented by    Patient Done Acceptance E VU,NR Educated pt about OT and POC. Educated to call for assist. Educated on safety throughout.  07/13/18 1425                      User Key     Initials Effective Dates Name Provider Type Discipline     06/08/18 -  Erika Rivera OTR/L Occupational Therapist OT                  OT Recommendation and Plan  Outcome Summary/Treatment Plan (OT)  Anticipated Discharge Disposition (OT): skilled nursing facility  Planned Therapy Interventions (OT  Eval): activity tolerance training, adaptive equipment training, BADL retraining, functional balance retraining, occupation/activity based interventions, passive ROM/stretching, patient/caregiver education/training, ROM/therapeutic exercise, strengthening exercise, transfer/mobility retraining  Therapy Frequency (OT Eval): other (see comments) (5-7 days a week)  Plan of Care Review  Plan of Care Reviewed With: patient  Plan of Care Reviewed With: patient          Outcome Measures     Row Name 07/13/18 1103 07/13/18 0840          How much help from another person do you currently need...    Turning from your back to your side while in flat bed without using bedrails? 3  -SEBASTIÁN  --     Moving from lying on back to sitting on the side of a flat bed without bedrails? 3  -SEBASTIÁN  --     Moving to and from a bed to a chair (including a wheelchair)? 3  -SEBASTIÁN  --     Standing up from a chair using your arms (e.g., wheelchair, bedside chair)? 3  -SEBASTIÁN  --     Climbing 3-5 steps with a railing? 3  -SEBASTIÁN  --     To walk in hospital room? 3  -SEBASTIÁN  --     AM-PAC 6 Clicks Score 18  -SEBASTIÁN  --        How much help from another is currently needed...    Putting on and taking off regular lower body clothing?  -- 2  -BH     Bathing (including washing, rinsing, and drying)  -- 2  -BH     Toileting (which includes using toilet bed pan or urinal)  -- 3  -BH     Putting on and taking off regular upper body clothing  -- 3  -BH     Taking care of personal grooming (such as brushing teeth)  -- 3  -BH     Eating meals  -- 4  -     Score  -- 17  -        Functional Assessment    Outcome Measure Options AM-PAC 6 Clicks Basic Mobility (PT)  -[facility]-PAC 6 Clicks Daily Activity (OT)  -       User Key  (r) = Recorded By, (t) = Taken By, (c) = Cosigned By    Initials Name Provider Type     Erika Rivera, OTR/L Occupational Therapist    SEBASTIÁN Pantoja, PT Physical Therapist          Time Calculation:   OT Start Time: 0840  OT Stop Time: 0935  OT Time  Calculation (min): 55 min  Therapy Suggested Charges     Code   Minutes Charges    None           Therapy Charges for Today     Code Description Service Date Service Provider Modifiers Qty    49426471749 HC OT SELFCARE CURRENT 7/13/2018 Erika Rivera OTR/L GO, CK 1    41716525735 HC OT SELFCARE PROJECTED 7/13/2018 Erika Rivera OTR/L GO, CJ 1    15047834883 HC OT SELF CARE/MGMT/TRAIN EA 15 MIN 7/13/2018 Erika Rivera OTR/L GO 2    [id number]  OT EVAL MOD COMPLEXITY 2 7/13/2018 Erika Rivera OTR/L GO 1          OT G-codes  OT Professional Judgement Used?: Yes  OT Functional Scales Options: AM-PAC 6 Clicks Daily Activity (OT)  Score: 17  Functional Limitation: Self care  Self Care Current Status (): At least 40 percent but less than 60 percent impaired, limited or restricted  Self Care Goal Status (): At least 20 percent but less than 40 percent impaired, limited or restricted    Erika Rivera OTR/L  7/13/2018

## 2018-07-13 NOTE — PLAN OF CARE
Problem: Patient Care Overview  Goal: Plan of Care Review   07/13/18 2258   Coping/Psychosocial   Plan of Care Reviewed With patient;daughter   Coping/Psychosocial   Consent Given to Review Plan with PT evaluation completed. Pt transferred supine to sit to stand to sit with CGA. Pt ambulated with RW 5 ft with CGA. Pt complained of dizziness with sitting EOB.. Dizziness did not worsen with standing and improved with supine. Function limited primarily by decreased strength, balance, and tolerance for functional mobility and activities. Pt will benefit from PT to regain lost function. Anticipate return to SNF at discharge.

## 2018-07-13 NOTE — PLAN OF CARE
Problem: Patient Care Overview  Goal: Plan of Care Review  Outcome: Ongoing (interventions implemented as appropriate)   07/13/18 0840   Coping/Psychosocial   Plan of Care Reviewed With patient   Coping/Psychosocial   Consent Given to Review Plan with OT caty completed this date. Pt pleasant and engaging but increased SOB and pain with activity. Pt required redirection for safety with t/f and ADL tasks. Pt decreased safety with RW int ight placed to bathroom and back. Pt averaged need for min assist for sponge bath and UB dressing. Pt could benefit from skilled OT to increase strength, endurance, safety and independence with ADL. Recommend to d/c back to SNF with further therapy.

## 2018-07-14 NOTE — THERAPY DISCHARGE NOTE
Acute Care - Occupational Therapy Discharge Summary  [facility]     Patient Name: Rodriguez Botello Jr.  : 1934  MRN: [id number]    Today's Date: 2018  Onset of Illness/Injury or Date of Surgery: 18    Date of Referral to OT: 18  Referring Physician: Itzel Sharif      Admit Date: 2018        OT Recommendation and Plan    Visit Dx:    ICD-10-CM ICD-9-CM   1. Atrial fibrillation, unspecified type (CMS/HCC) I48.91 427.31   2. Dyspnea, unspecified type R06.00 786.09   3. Cirrhosis of liver with ascites, unspecified hepatic cirrhosis type (CMS/HCC) K74.60 571.5   4. Other ascites R18.8 789.59   5. Impaired functional mobility, balance, gait, and endurance Z74.09 V49.89   6. Impaired mobility and ADLs Z74.09 799.89                         Outcome Measures     Row Name 18 1103 18 0840          How much help from another person do you currently need...    Turning from your back to your side while in flat bed without using bedrails? 3  -SEBASTIÁN  --     Moving from lying on back to sitting on the side of a flat bed without bedrails? 3  -SEBASTIÁN  --     Moving to and from a bed to a chair (including a wheelchair)? 3  -SEBASTIÁN  --     Standing up from a chair using your arms (e.g., wheelchair, bedside chair)? 3  -SEBASTIÁN  --     Climbing 3-5 steps with a railing? 3  -SEBASTIÁN  --     To walk in hospital room? 3  -SEBASTIÁN  --     AM-PAC 6 Clicks Score 18  -SEBASTIÁN  --        How much help from another is currently needed...    Putting on and taking off regular lower body clothing?  -- 2  -BH     Bathing (including washing, rinsing, and drying)  -- 2  -BH     Toileting (which includes using toilet bed pan or urinal)  -- 3  -BH     Putting on and taking off regular upper body clothing  -- 3  -BH     Taking care of personal grooming (such as brushing teeth)  -- 3  -BH     Eating meals  -- 4  -BH     Score  -- 17  -        Functional Assessment    Outcome Measure Options AM-PAC 6 Clicks Basic Mobility (PT)  -SEBASTIÁN AM-PAC  6 Clicks Daily Activity (OT)  -       User Key  (r) = Recorded By, (t) = Taken By, (c) = Cosigned By    Initials Name Provider Type     Erika Rivera, OTR/L Occupational Therapist    SEBASTIÁN Pantoja, PT Physical Therapist          Therapy Suggested Charges     Code   Minutes Charges    None                 OT Discharge Summary  Anticipated Discharge Disposition (OT): skilled nursing facility  Reason for Discharge: Discharge from facility, Per MD order  Outcomes Achieved: Refer to plan of care for updates on goals achieved  Discharge Destination: SNF      Monique Sorensen, OT  7/14/2018

## 2018-07-24 NOTE — PATIENT INSTRUCTIONS
Ascites  Ascites is a collection of excess fluid in the abdomen. Ascites can range from mild to severe. It can get worse without treatment.  What are the causes?  Possible causes include:  · Cirrhosis. This is the most common cause of ascites.  · Infection or inflammation in the abdomen.  · Cancer in the abdomen.  · Heart failure.  · Kidney disease.  · Inflammation of the pancreas.  · Clots in the veins of the liver.    What are the signs or symptoms?  Signs and symptoms may include:  · A feeling of fullness in your abdomen. This is common.  · An increase in the size of your abdomen or your waist.  · Swelling in your legs.  · Swelling of the scrotum in men.  · Difficulty breathing.  · Abdominal pain.  · Sudden weight gain.    If the condition is mild, you may not have symptoms.  How is this diagnosed?  To make a diagnosis, your health care provider will:  · Ask about your medical history.  · Perform a physical exam.  · Order imaging tests, such as an ultrasound or CT scan of your abdomen.    How is this treated?  Treatment depends on the cause of the ascites. It may include:  · Taking a pill to make you urinate. This is called a water pill (diuretic pill).  · Strictly reducing your salt (sodium) intake. Salt can cause extra fluid to be kept in the body, and this makes ascites worse.  · Having a procedure to remove fluid from your abdomen (paracentesis).  · Having a procedure to transfer fluid from your abdomen into a vein.  · Having a procedure that connects two of the major veins within your liver and relieves pressure on your liver (TIPS procedure).    Ascites may go away or improve with treatment of the condition that caused it.  Follow these instructions at home:  · Keep track of your weight. To do this, weigh yourself at the same time every day and record your weight.  · Keep track of how much you drink and any changes in the amount you urinate.  · Follow any instructions that your health care provider gives  you about how much to drink.  · Try not to eat salty (high-sodium) foods.  · Take medicines only as directed by your health care provider.  · Keep all follow-up visits as directed by your health care provider. This is important.  · Report any changes in your health to your health care provider, especially if you develop new symptoms or your symptoms get worse.  Contact a health care provider if:  · Your gain more than 3 pounds in 3 days.  · Your abdominal size or your waist size increases.  · You have new swelling in your legs.  · The swelling in your legs gets worse.  Get help right away if:  · You develop a fever.  · You develop confusion.  · You develop new or worsening difficulty breathing.  · You develop new or worsening abdominal pain.  · You develop new or worsening swelling in the scrotum (in men).  This information is not intended to replace advice given to you by your health care provider. Make sure you discuss any questions you have with your health care provider.  Document Released: 12/18/2006 Document Revised: 04/26/2017 Document Reviewed: 07/17/2015  [employer] Interactive Patient Education © 2018 [employer]

## 2018-07-24 NOTE — PROGRESS NOTES
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  Patient has had some improvement in his ascites with decreased tension in his abdomen.  Patient is on increased dose of diuretics.  Graph plan    Plan; we'll patient follow-up in 3 months continue current medication.  Discussed patient to come in earlier if any worsening of symptoms.              Past Medical History:   Past Medical History:   Diagnosis Date   • Ascites     Paracentesis Performed 07/11/2017 and 07/17/2018   • GERD (gastroesophageal reflux disease)    • History of echocardiogram 05/04/2015    Echocardiogram W/ color flow 82583 (Low normal LV funciton with Ef of 50-55%. Normal RV size and funciton. Grade 1A diastolic dysfunction. No indication of left ventricular or left atrial thrombus. No sig. valvular regurg or stenosis.)   • Hyperlipidemia    • Hypertension        Family History:  Family History   Problem Relation Age of Onset   • Heart attack Other    • Heart disease Other    • Stroke Other    • Hypertension Other    • Diabetes Other    • Lung cancer Other        Social History:   reports that he has never smoked. His smokeless tobacco use includes Chew. He reports that he does not drink alcohol or use drugs.    Medications:   Prior to Admission medications    Medication Sig Start Date End Date Taking? Authorizing Provider   acetaminophen (TYLENOL) 500 MG tablet Take 500 mg by mouth Every 4 (Four) Hours As Needed for Mild Pain  or Fever (VAS 1-4).   Yes Historical Provider, MD   aspirin 325 MG tablet Take 325 mg by mouth Daily. 6/18/15  Yes Historical Provider, MD   busPIRone (BUSPAR) 5 MG tablet Take 5 mg by mouth 3 (Three) Times a Day.   Yes Historical Provider, MD   digoxin (LANOXIN) 125 MCG tablet Take 125 mcg by mouth Daily. In A.M.    "Yes Historical Provider, MD   diltiaZEM CD (CARDIZEM CD) 120 MG 24 hr capsule Take 120 mg by mouth Daily. Every Morning Before Meal For Heart Or To Lower Blood Pressure   Yes Historical Provider, MD   doxycycline (MONODOX) 100 MG capsule Take 1 capsule by mouth 2 (Two) Times a Day. 7/13/18  Yes Miguel Sharif MD   famotidine (PEPCID) 20 MG tablet Take 20 mg by mouth Every Morning.   Yes Historical Provider, MD   fluticasone (FLONASE) 50 MCG/ACT nasal spray 2 sprays into each nostril Daily.   Yes Historical Provider, MD   furosemide (LASIX) 40 MG tablet Take 40 mg by mouth Every Morning.   Yes Historical Provider, MD   HYDROcodone-acetaminophen (NORCO) 5-325 MG per tablet Take 1 tablet by mouth Every 8 (Eight) Hours As Needed for Moderate Pain  (VAS 5-10).   Yes Historical Provider, MD   LACTULOSE PO 15 cc by mouth at night   Yes Historical Provider, MD   PARoxetine (PAXIL) 10 MG tablet Take 10 mg by mouth Every Morning.   Yes Historical Provider, MD   risperiDONE (risperDAL) 3 MG tablet Take 3 mg by mouth Every Night. 6/18/15  Yes Historical Provider, MD   spironolactone (ALDACTONE) 50 MG tablet Take 1 tablet by mouth Daily. 7/13/18  Yes Miguel Sharif MD       Allergies:  Codeine; Iodine; Morphine and related; and Penicillins    ROS:    Review of Systems   Constitutional: Negative for activity change, appetite change, chills, diaphoresis, fatigue, fever and unexpected weight change.   HENT: Negative for sore throat and trouble swallowing.    Respiratory: Negative for shortness of breath.    Gastrointestinal: Negative for abdominal distention, abdominal pain, anal bleeding, blood in stool, constipation, diarrhea, nausea, rectal pain and vomiting.   Musculoskeletal: Negative for arthralgias.   Skin: Negative for pallor.   Neurological: Negative for light-headedness.     Objective     Blood pressure (!) 86/46, pulse 107, height 172.7 cm (68\"), weight 72.6 kg (160 lb), SpO2 97 %.    Physical Exam   Constitutional: He is " oriented to person, place, and time. He appears well-developed and well-nourished. No distress.   HENT:   Head: Normocephalic and atraumatic.   Cardiovascular: Normal rate, regular rhythm, normal heart sounds and intact distal pulses.  Exam reveals no gallop and no friction rub.    No murmur heard.  Pulmonary/Chest: Breath sounds normal. No respiratory distress. He has no wheezes. He has no rales. He exhibits no tenderness.   Abdominal: Soft. Bowel sounds are normal. He exhibits distension. He exhibits no mass. There is no tenderness. There is no rebound and no guarding. No hernia.   Musculoskeletal: Normal range of motion. He exhibits no edema.   Neurological: He is alert and oriented to person, place, and time.   Skin: Skin is warm and dry. No rash noted. He is not diaphoretic. No erythema. No pallor.   Psychiatric: He has a normal mood and affect. His behavior is normal. Judgment and thought content normal.        Assessment/Plan   Rodriguez was seen today for 2 week clinical appointment.    Diagnoses and all orders for this visit:    Cirrhosis of liver with ascites, unspecified hepatic cirrhosis type (CMS/HCC)  -     Comprehensive Metabolic Panel  -     CBC & Differential        * Surgery not found *     Diagnosis Plan   1. Cirrhosis of liver with ascites, unspecified hepatic cirrhosis type (CMS/HCC)  Comprehensive Metabolic Panel    CBC & Differential       Anticipated Surgical Procedure:  Orders Placed This Encounter   Procedures   • Comprehensive Metabolic Panel   • CBC & Differential     Order Specific Question:   Manual Differential     Answer:   No       The risks, benefits, and alternatives of this procedure have been discussed with the patient or the responsible party- the patient understands and agrees to proceed.

## 2018-08-02 NOTE — ED NOTES
Discharge instructions given to pt daughter and patient.  Verbalized instructions.  Ambulance being set up for return to [facility]     Bianca Vallejo RN  08/02/18 2065

## 2018-08-02 NOTE — PRE-PROCEDURE NOTE
Patient for US directed paracentesis. Procedure, risks , and benefits discussed with patient and patient  gave informed consent.  H&P dated 7/13/2018 reviewed with no changes..

## 2018-08-02 NOTE — ED PROVIDER NOTES
Subjective   History of Present Illness  84-year-old male sent in from nursing home because of possible need for paracentesis.  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  Reportedly the patient was postictal have a paracentesis performed yesterday but the patient did not member that he had this appointment and he was not taken for the paracentesis.  He is reportedly here today for evaluation for possible paracentesis.  Patient does have distended abdomen does not have any marked shortness breath or abdominal pain.  He is also currently being treated for pneumonia and was started on antibiotics within the last day.  Review of Systems   Constitutional: Negative for chills and fever.   HENT: Negative for congestion, nosebleeds, postnasal drip, sinus pressure and sore throat.    Respiratory: Negative for cough, chest tightness, shortness of breath, wheezing and stridor.    Gastrointestinal: Positive for abdominal distention. Negative for abdominal pain, constipation, diarrhea, nausea and vomiting.   Genitourinary: Negative for dysuria, flank pain, frequency, hematuria, testicular pain and urgency.   Musculoskeletal: Negative for arthralgias and myalgias.   Skin: Negative for rash.   Neurological: Negative for syncope, light-headedness and headaches.   Psychiatric/Behavioral: Negative.        Past Medical History:   Diagnosis Date   • Ascites     Paracentesis Performed 07/11/2017 and 07/17/2018   • GERD (gastroesophageal reflux disease)    • History of echocardiogram 05/04/2015    Echocardiogram W/ color flow 24651 (Low normal LV funciton with Ef of 50-55%. Normal RV size and funciton. Grade 1A diastolic dysfunction. No indication of left ventricular or left atrial thrombus. No sig. valvular regurg or stenosis.)   • Hyperlipidemia    • Hypertension        Allergies   Allergen Reactions   • Codeine    • Iodine    • Morphine And Related     • Penicillins      AND PENICILLIN G       Past Surgical History:   Procedure Laterality Date   • BACK SURGERY      Approximatly 20 Years Prior To Todays Clinical Appointment Dated 03/28/2017 In [facility]       Family History   Problem Relation Age of Onset   • Heart attack Other    • Heart disease Other    • Stroke Other    • Hypertension Other    • Diabetes Other    • Lung cancer Other        Social History     Social History   • Marital status:      Social History Main Topics   • Smoking status: Never Smoker   • Smokeless tobacco: Current User     Types: Chew   • Alcohol use No   • Drug use: No   • Sexual activity: Defer     Other Topics Concern   • Not on file           Objective   Physical Exam   Constitutional: He is oriented to person, place, and time. He appears well-developed and well-nourished.   HENT:   Head: Normocephalic and atraumatic.   Eyes: Pupils are equal, round, and reactive to light. Conjunctivae and EOM are normal.   Neck: Normal range of motion. Neck supple.   Cardiovascular: Normal rate, regular rhythm and normal heart sounds.    Pulmonary/Chest: Effort normal and breath sounds normal.   Abdominal: Soft. He exhibits distension. There is no tenderness. There is no rebound and no guarding.   Genitourinary: Penis normal.   Musculoskeletal: Normal range of motion.   Neurological: He is alert and oriented to person, place, and time.   Skin: Skin is warm and dry. Capillary refill takes less than 2 seconds.   Psychiatric: He has a normal mood and affect.   Nursing note and vitals reviewed.      Procedures           ED Course      Labs Reviewed   COMPREHENSIVE METABOLIC PANEL - Abnormal; Notable for the following:        Result Value    Glucose 130 (*)     Calcium 8.0 (*)     Albumin 2.60 (*)     Alkaline Phosphatase 245 (*)     Globulin 3.8 (*)     A/G Ratio 0.7 (*)     All other components within normal limits    Narrative:     The MDRD GFR formula is only valid for adults with stable  renal function between ages 18 and 70.   CBC WITH AUTO DIFFERENTIAL - Abnormal; Notable for the following:     RBC 3.51 (*)     Hemoglobin 11.5 (*)     Hematocrit 33.4 (*)     RDW 14.9 (*)     RDW-SD 51.7 (*)     Platelets 132 (*)     Monocyte % 12.3 (*)     Monocytes, Absolute 0.92 (*)     All other components within normal limits   APTT - Normal    Narrative:     The recommended Heparin therapeutic range is 68-97 seconds.   LIPASE - Normal   RAINBOW DRAW    Narrative:     The following orders were created for panel order [place] Draw.  Procedure                               Abnormality         Status                     ---------                               -----------         ------                     Light Blue Top[[id number]]                                   Final result               Green Top (Gel)[[id number]]                                  Final result               Lavender Top[[id number]]                                     Final result               Gold Top - SST[[id number]]                                   Final result                 Please view results for these tests on the individual orders.   PROTIME-INR   LIGHT BLUE TOP   GREEN TOP   LAVENDER TOP   GOLD TOP - SST   CBC AND DIFFERENTIAL    Narrative:     The following orders were created for panel order CBC & Differential.  Procedure                               Abnormality         Status                     ---------                               -----------         ------                     CBC Auto Differential[[id number]]        Abnormal            Final result                 Please view results for these tests on the individual orders.     US Paracentesis    (Results Pending)                 MDM  Number of Diagnoses or Management Options  Other ascites:   Diagnosis management comments: Patient has a distended abdomen without any tense ascites rebound or guarding.  He is not in any respiratory distress.  Although is distended as stated is  not tense.  No peritoneal signs.  No signs of airway compromise along issues secondary to the ascites.  The patient light the discharge back to nursing home and at this point in time do not see any indication for emergent paracentesis at this time.  He certainly needs to be scheduled for his routine paracentesis as it sounds like he missed it yesterday.  No indication to keep the patient against his will for paracentesis and he may be discharged back to the nursing home.  He is to discuss getting the paracentesis with the primary care physician.    Got paracentesis today performed by radiology. Please see their note for procedure note. Discharge home.        Amount and/or Complexity of Data Reviewed  Clinical lab tests: reviewed          Final diagnoses:   Other ascites            Donn Reynolds MD  08/02/18 1443       Donn Reynolds MD  08/02/18 1641       Donn Reynolds MD  08/26/18 0748

## 2018-08-21 NOTE — PROGRESS NOTES
[facility]      Chief Complaint:   Chief Complaint   Patient presents with   • 1 month follow up       Subjective     HPI:   Patient with cirrhosis of the liver.  Patient has worsening ascites and having some difficulty breathing.  Discussed with patient that we will do paracentesis to improve the symptoms.  While patient follow up in 4 months.  We'll do repeat labwork prior to next visit.    Past Medical History:   Past Medical History:   Diagnosis Date   • Ascites     Paracentesis Performed 07/11/2017 and 07/17/2018   • GERD (gastroesophageal reflux disease)    • History of echocardiogram 05/04/2015    Echocardiogram W/ color flow 66520 (Low normal LV funciton with Ef of 50-55%. Normal RV size and funciton. Grade 1A diastolic dysfunction. No indication of left ventricular or left atrial thrombus. No sig. valvular regurg or stenosis.)   • Hyperlipidemia    • Hypertension        Family History:  Family History   Problem Relation Age of Onset   • Heart attack Other    • Heart disease Other    • Stroke Other    • Hypertension Other    • Diabetes Other    • Lung cancer Other        Social History:   reports that he has never smoked. His smokeless tobacco use includes Chew. He reports that he does not drink alcohol or use drugs.    Medications:   Prior to Admission medications    Medication Sig Start Date End Date Taking? Authorizing Provider   acetaminophen (TYLENOL) 500 MG tablet Take 500 mg by mouth Every 4 (Four) Hours As Needed for Mild Pain  or Fever (VAS 1-4).   Yes Dolores Cohn MD   aspirin 325 MG tablet Take 325 mg by mouth Daily. 6/18/15  Yes Dolores Cohn MD   busPIRone (BUSPAR) 5 MG tablet Take 5 mg by mouth 3 (Three) Times a Day.   Yes Dolores Cohn MD   digoxin (LANOXIN) 125 MCG tablet Take 125 mcg by mouth Daily. In A.M.   Yes Dolores Cohn MD   diltiaZEM CD (CARDIZEM CD) 120 MG 24 hr capsule Take 120 mg by mouth Daily. Every Morning Before Meal  "For Heart Or To Lower Blood Pressure   Yes Dolores Cohn MD   doxycycline (MONODOX) 100 MG capsule Take 1 capsule by mouth 2 (Two) Times a Day. 7/13/18  Yes Miguel Sharif MD   famotidine (PEPCID) 20 MG tablet Take 20 mg by mouth Every Morning.   Yes Dolores Cohn MD   fluticasone (FLONASE) 50 MCG/ACT nasal spray 2 sprays into each nostril Daily.   Yes Dolores Cohn MD   furosemide (LASIX) 40 MG tablet Take 40 mg by mouth Every Morning.   Yes Dolores Cohn MD   HYDROcodone-acetaminophen (NORCO) 5-325 MG per tablet Take 1 tablet by mouth Every 8 (Eight) Hours As Needed for Moderate Pain  (VAS 5-10).   Yes Dolores Cohn MD   LACTULOSE PO 15 cc by mouth at night   Yes Dolores Cohn MD   levoFLOXacin (LEVAQUIN) 500 MG tablet Take 500 mg by mouth Daily.   Yes Dolores Cohn MD   PARoxetine (PAXIL) 10 MG tablet Take 10 mg by mouth Every Morning.   Yes Dolores Cohn MD   risperiDONE (risperDAL) 3 MG tablet Take 3 mg by mouth Every Night. 6/18/15  Yes Dolores Cohn MD   spironolactone (ALDACTONE) 50 MG tablet Take 1 tablet by mouth Daily. 7/13/18  Yes Miguel Sharif MD       Allergies:  Codeine; Iodine; Morphine and related; and Penicillins    ROS:    Review of Systems   Constitutional: Negative for activity change, appetite change, chills, diaphoresis, fatigue, fever and unexpected weight change.   HENT: Negative for sore throat and trouble swallowing.    Respiratory: Negative for shortness of breath.    Gastrointestinal: Positive for abdominal pain. Negative for abdominal distention, anal bleeding, blood in stool, constipation, diarrhea, nausea, rectal pain and vomiting.   Musculoskeletal: Negative for arthralgias.   Skin: Negative for pallor.   Neurological: Negative for light-headedness.     Objective     Blood pressure 102/60, pulse 107, height 172.7 cm (68\"), weight 69.9 kg (154 lb), SpO2 97 %.    Physical Exam   Constitutional: He is oriented to " person, place, and time. He appears well-developed and well-nourished. No distress.   HENT:   Head: Normocephalic and atraumatic.   Cardiovascular: Normal rate, regular rhythm, normal heart sounds and intact distal pulses.  Exam reveals no gallop and no friction rub.    No murmur heard.  Pulmonary/Chest: Breath sounds normal. No respiratory distress. He has no wheezes. He has no rales. He exhibits no tenderness.   Abdominal: Soft. Bowel sounds are normal. He exhibits no distension and no mass. There is no tenderness. There is no rebound and no guarding. No hernia.   Musculoskeletal: Normal range of motion. He exhibits no edema.   Neurological: He is alert and oriented to person, place, and time.   Skin: Skin is warm and dry. No rash noted. He is not diaphoretic. No erythema. No pallor.   Psychiatric: He has a normal mood and affect. His behavior is normal. Judgment and thought content normal.        Assessment/Plan   Rodriguez was seen today for 1 month follow up.    Diagnoses and all orders for this visit:    Other ascites  -     US Abdomen Complete; Future        * Surgery not found *     Diagnosis Plan   1. Other ascites  US Abdomen Complete       Anticipated Surgical Procedure:  Orders Placed This Encounter   Procedures   • US Abdomen Complete     Standing Status:   Future     Standing Expiration Date:   8/21/2019     Scheduling Instructions:      paracentesis     Order Specific Question:   Reason for Exam:     Answer:   paracen       The risks, benefits, and alternatives of this procedure have been discussed with the patient or the responsible party- the patient understands and agrees to proceed.

## 2018-09-07 NOTE — PRE-PROCEDURE NOTE
Recurrent ascites. For ultrasound paracentesis.Procedure explained. Consent obtained. Risks and benefits advised.

## 2018-09-07 NOTE — ED PROVIDER NOTES
Subjective   84 year old male from nursing home with chronic cirrhosis requiring frequent paracentesis is brought by EMS due to abdominal distension and shortness of breath. Vitals stable. No acute distress. Last paracentesis 2 weeks ago. Would like fluid drained. Unable to tolerate PO due to pressure. Same as previous, no other complaints. No changes to medications. Not on blood thinner.    Family history, surgical history, social history, current medications and allergies are reviewed with the patient and triage documentation and vitals are reviewed.          History provided by:  Patient, relative and medical records   used: No        Review of Systems   Constitutional: Positive for appetite change. Negative for activity change and fever.   HENT: Negative.    Eyes: Negative.    Respiratory: Positive for shortness of breath. Negative for cough and chest tightness.    Cardiovascular: Negative for chest pain, palpitations and leg swelling.   Gastrointestinal: Positive for abdominal distention and abdominal pain. Negative for constipation, nausea and vomiting.   Endocrine: Negative.    Genitourinary: Negative.    Musculoskeletal: Negative.    Skin: Negative.    Allergic/Immunologic: Negative.    Neurological: Negative.    Hematological: Negative for adenopathy. Does not bruise/bleed easily.   Psychiatric/Behavioral: Negative.        Past Medical History:   Diagnosis Date   • Ascites     Paracentesis Performed 07/11/2017 and 07/17/2018   • GERD (gastroesophageal reflux disease)    • History of echocardiogram 05/04/2015    Echocardiogram W/ color flow 13621 (Low normal LV funciton with Ef of 50-55%. Normal RV size and funciton. Grade 1A diastolic dysfunction. No indication of left ventricular or left atrial thrombus. No sig. valvular regurg or stenosis.)   • Hyperlipidemia    • Hypertension        Allergies   Allergen Reactions   • Codeine    • Iodine    • Morphine And Related    • Penicillins       AND PENICILLIN G       Past Surgical History:   Procedure Laterality Date   • BACK SURGERY      Approximatly 20 Years Prior To Todays Clinical Appointment Dated 03/28/2017 [facility] [place] [facility]       Family History   Problem Relation Age of Onset   • Heart attack Other    • Heart disease Other    • Stroke Other    • Hypertension Other    • Diabetes Other    • Lung cancer Other        Social History     Social History   • Marital status:      Social History Main Topics   • Smoking status: Never Smoker   • Smokeless tobacco: Current User     Types: Chew   • Alcohol use No   • Drug use: No   • Sexual activity: Defer     Other Topics Concern   • Not on file           Objective   Physical Exam   Constitutional:   cachectic   HENT:   Head: Normocephalic.   Mouth/Throat: Oropharynx is clear and moist.   Eyes: Pupils are equal, round, and reactive to light.   Neck: Normal range of motion.   Cardiovascular: Normal rate and regular rhythm.    Pulmonary/Chest: Effort normal and breath sounds normal. No respiratory distress. He has no wheezes. He has no rales.   Abdominal: He exhibits distension and ascites. He exhibits no mass. There is generalized tenderness. There is no rigidity and no guarding.   Musculoskeletal: Normal range of motion.   Neurological: He is alert.   Skin: Skin is warm. Capillary refill takes less than 2 seconds.   Nursing note and vitals reviewed.      Procedures  None         ED Course        Labs Reviewed   COMPREHENSIVE METABOLIC PANEL - Abnormal; Notable for the following:        Result Value    Glucose 120 (*)     Sodium 134 (*)     Calcium 8.3 (*)     Albumin 2.90 (*)     AST (SGOT) 64 (*)     Alkaline Phosphatase 384 (*)     Total Bilirubin 1.4 (*)     Globulin 4.0 (*)     A/G Ratio 0.7 (*)     All other components within normal limits    Narrative:     The MDRD GFR formula is only valid for adults with stable renal function between ages 18 and 70.   PROTIME-INR - Abnormal; Notable for the  following:     INR 1.22 (*)     All other components within normal limits    Narrative:     Therapeutic range for most indications is 2.0-3.0 INR,  or 2.5-3.5 for mechanical heart valves.   CBC WITH AUTO DIFFERENTIAL - Abnormal; Notable for the following:     RBC 3.71 (*)     Hemoglobin 12.4 (*)     Hematocrit 35.3 (*)     RDW 14.9 (*)     RDW-SD 52.2 (*)     Platelets 148 (*)     Lymphocyte % 9.2 (*)     Monocytes, Absolute 0.91 (*)     All other components within normal limits   APTT - Normal    Narrative:     The recommended Heparin therapeutic range is 68-97 seconds.   DIGOXIN LEVEL - Normal   RAINBOW DRAW    Narrative:     The following orders were created for panel order [place] Draw.  Procedure                               Abnormality         Status                     ---------                               -----------         ------                     Light Blue Top[[id number]]                                   Final result               Green Top (Gel)[[id number]]                                  Final result               Lavender Top[[id number]]                                     Final result               Gold Top - SST[[id number]]                                   Final result                 Please view results for these tests on the individual orders.   LIGHT BLUE TOP   GREEN TOP   LAVENDER TOP   GOLD TOP - SST   CBC AND DIFFERENTIAL    Narrative:     The following orders were created for panel order CBC & Differential.  Procedure                               Abnormality         Status                     ---------                               -----------         ------                     CBC Auto Differential[[id number]]        Abnormal            Final result                 Please view results for these tests on the individual orders.     Us Paracentesis    Result Date: 9/7/2018  Narrative: Ultrasound paracentesis. HISTORY: 84-year-old male with recurrent ascites. After informed consent  ,paracentesis was performed using sterile technique and ultrasound guidance. Under ultrasound guidance a path for paracentesis in the right lateral abdominal wall was identified. Under ultrasound guidance a 5 Luxembourgish multipurpose drainage catheter was introduced into the peritoneal cavity and 8 L of clear yellowish fluid was aspirated without difficulty. The patient tolerated the procedure well with no evidence of any immediate complications.     Impression: CONCLUSION: Technically successful and uneventful ultrasound guided paracentesis. Electronically signed by:  Peewee Reeves MD  9/7/2018 5:15 PM CDT Workstation: [id number]     Paracentesis    Result Date: 8/23/2018  Narrative: PROCEDURE: Ultrasound-guided paracentesis COMPARISON: None HISTORY: Ascites TECHNIQUE: The risks, benefits and alternatives were explained to the patient who had ample opportunity to ask questions. The patient agreed to proceed and informed consent was obtained. An adequate fluid pocket was identified in the right lower quadrant. The site was marked then prepped and draped in sterile fashion. Approximately 8 mL of 2% lidocaine solution was used for local superficial and deep anesthesia. A 5 Luxembourgish Yueh needle was inserted into the peritoneal cavity under sonographic guidance. FINDINGS: Approximately 6000 mL of clear, serous fluid was removed. There were no immediate post procedure complications.     Impression: CONCLUSION:  Successful ultrasound-guided paracentesis, as described above. Electronically signed by:  Bowen Gusman MD  8/23/2018 3:06 PM CDT Workstation: [id number]          [facility]  Number of Diagnoses or Management Options  Other ascites:      Amount and/or Complexity of Data Reviewed  Clinical lab tests: reviewed  Tests in the radiology section of CPT®: reviewed  Discussion of test results with the performing providers: yes  Obtain history from someone other than the patient: yes  Discuss the patient with other providers: yes    Patient  Progress  Patient progress: stable    Patient undergoes paracentesis via IR US guided drainage. 8 L removed. Due to amount patient brought back to ED for labs and Albumin transfusion. Patient tolerates procedure without complication. Discharged back to nursing home in stable and improved condition.     Final diagnoses:   Other ascites            Swapnil Morocho,   09/08/18 0039

## 2018-09-10 NOTE — PROGRESS NOTES
09/10/2018, 1442 - Patient's Primary Care Physician, Dr. Carl Hanks M.D. telephoned this staff member with request to inquire if Dr. Carl Gill M.D. would be approving of ordering scheduled Paracentesis for mutual patient Rodriguez Botello.  Patient prior Paracentesis completed 09/07/2018 via admission per [facility] Emergency Department.    09/10/2018, 1603 - Verbal order received per Dr. Carl Gill M.D. - Patient to complete Gastroenterology clinical appointment prior to ordering scheduled Paracentesis.     09/10/57206, 1606 - Dr. Carl Hanks M.D. telephoned per this staff member [phone] with notification of approval per Dr. Carl Gill M.D. regarding request to order scheduled Paracentesis, however patient will need to complete Gastroenterology clinical appointment prior to ordering  scheduled Paracentesis.  Per Dr. Hanks, this staff member to contact Director Of Nursing Thais Bedolla with [facility] to schedule patient Gastroenterology clinical appointment.    09/10/2018, 1608 - Thais Bedolla, Director Of Nursing with [facility] telephone per this staff member [phone].  Patient clinical appointment with Dr. Carl Gill M.D. scheduled Tuesday, September 25, 2018 at 2:30 p.m. regarding the ordering of scheduled Paracentesis.  Ms. Bedolla verbalized understanding by repeating information.

## 2018-09-19 PROBLEM — D64.9 ANEMIA: Status: ACTIVE | Noted: 2018-01-01

## 2018-09-19 PROBLEM — N17.9 ACUTE KIDNEY INJURY (HCC): Status: ACTIVE | Noted: 2018-01-01

## 2018-09-19 NOTE — ED NOTES
S/w radiology and pt not on schedule for percentesis as were told by pt family and nursing home.  Dr Frederick notified and ordering procedure.  Family updated.       Della Covington, RN  09/19/18 2214

## 2018-09-19 NOTE — H&P
[facility] Medicine Services  INPATIENT HISTORY AND PHYSICAL       Patient Care Team:  Carl Hanks MD as PCP - General (Family Medicine)    Chief complaint   Chief Complaint   Patient presents with   • Rapid Heart Rate       Subjective     Patient is a 84 y.o. male history of cirrhosis of the liver, hypertension, dyslipidemia, GERD, proximal atrial fibrillation who was transferred from the nursing home to emergency department due to worsening abdominal distention associated with shortness of air, palpitations and subjective fever.  No reported history of chest pain, nausea, vomiting, hematemesis, melena, hematochezia, syncope or presyncope.    On triage patient's heart rate ranged from  with normal blood pressure.  Blood work showed elevated liver function test, creatinine of 1.67 (from a baseline of less than 1), leukocytosis of 16.62 with left shift and hemoglobin of 9.2.    Patient was scheduled to have paracentesis done today by Dr. Klein.  His last paracentesis was on September 7, 2018.  He did undergo ultrasound-guided paracentesis with removal of 7200 cc of bloody ascitic fluid.    Family and social history: Patient lives in a nursing home and denies history of alcohol or tobacco use.  There is family history of hypertension, heart disease and diabetes.        Review of Systems   Constitutional: Positive for activity change and fatigue. Negative for appetite change, chills, diaphoresis and fever.   HENT: Negative for trouble swallowing and voice change.    Eyes: Negative for photophobia and visual disturbance.   Respiratory: Negative for cough, choking, chest tightness, shortness of breath, wheezing and stridor.    Cardiovascular: Positive for palpitations. Negative for chest pain and leg swelling.   Gastrointestinal: Positive for abdominal distention. Negative for abdominal pain, blood in stool, constipation, diarrhea, nausea and vomiting.   Endocrine:  Negative for cold intolerance, heat intolerance, polydipsia, polyphagia and polyuria.   Genitourinary: Negative for decreased urine volume, difficulty urinating, dysuria, enuresis, flank pain, frequency, hematuria and urgency.   Musculoskeletal: Negative for arthralgias, gait problem, myalgias, neck pain and neck stiffness.   Skin: Negative for pallor, rash and wound.   Neurological: Negative for dizziness, tremors, seizures, syncope, facial asymmetry, speech difficulty, weakness, light-headedness, numbness and headaches.   Hematological: Does not bruise/bleed easily.   Psychiatric/Behavioral: Negative for agitation, behavioral problems and confusion.         History  Past Medical History:   Diagnosis Date   • Ascites     Paracentesis Performed 07/11/2017 and 07/17/2018   • GERD (gastroesophageal reflux disease)    • History of echocardiogram 05/04/2015    Echocardiogram W/ color flow 56466 (Low normal LV funciton with Ef of 50-55%. Normal RV size and funciton. Grade 1A diastolic dysfunction. No indication of left ventricular or left atrial thrombus. No sig. valvular regurg or stenosis.)   • Hyperlipidemia    • Hypertension    • Liver failure (CMS/HCC)      Past Surgical History:   Procedure Laterality Date   • BACK SURGERY      Approximatly 20 Years Prior To Todays Clinical Appointment Dated 03/28/2017 In [place] [facility]     Family History   Problem Relation Age of Onset   • Heart attack Other    • Heart disease Other    • Stroke Other    • Hypertension Other    • Diabetes Other    • Lung cancer Other      Social History   Substance Use Topics   • Smoking status: Never Smoker   • Smokeless tobacco: Current User     Types: Chew   • Alcohol use No       (Not in a hospital admission)  Allergies:  Codeine; Iodine; Morphine and related; and Penicillins  Prior to Admission medications    Medication Sig Start Date End Date Taking? Authorizing Provider   acetaminophen (TYLENOL) 500 MG tablet Take 500 mg by mouth Every 4  (Four) Hours As Needed for Mild Pain  or Fever (VAS 1-4).    Dolores Cohn MD   aspirin 325 MG tablet Take 325 mg by mouth Daily. 6/18/15   Dolores Cohn MD   busPIRone (BUSPAR) 5 MG tablet Take 5 mg by mouth 3 (Three) Times a Day.    Dolores Cohn MD   digoxin (LANOXIN) 125 MCG tablet Take 125 mcg by mouth Daily. In A.M.    Dolores Cohn MD   diltiaZEM CD (CARDIZEM CD) 120 MG 24 hr capsule Take 120 mg by mouth Daily. Every Morning Before Meal For Heart Or To Lower Blood Pressure    Dolores Cohn MD   doxycycline (MONODOX) 100 MG capsule Take 1 capsule by mouth 2 (Two) Times a Day. 7/13/18   Miguel Sharif MD   famotidine (PEPCID) 20 MG tablet Take 20 mg by mouth Every Morning.    Dolores Cohn MD   fluticasone (FLONASE) 50 MCG/ACT nasal spray 2 sprays into each nostril Daily.    Dolores Cohn MD   furosemide (LASIX) 40 MG tablet Take 40 mg by mouth Every Morning.    Dolores Cohn MD   HYDROcodone-acetaminophen (NORCO) 5-325 MG per tablet Take 1 tablet by mouth Every 8 (Eight) Hours As Needed for Moderate Pain  (VAS 5-10).    Dolores Cohn MD   LACTULOSE PO 15 cc by mouth at night    Dolores Cohn MD   levoFLOXacin (LEVAQUIN) 500 MG tablet Take 500 mg by mouth Daily.    Dolores Cohn MD   PARoxetine (PAXIL) 10 MG tablet Take 10 mg by mouth Every Morning.    Dolores Cohn MD   risperiDONE (risperDAL) 3 MG tablet Take 3 mg by mouth Every Night. 6/18/15   Dolores Cohn MD   spironolactone (ALDACTONE) 50 MG tablet Take 1 tablet by mouth Daily. 7/13/18   Miguel Sharif MD       Objective        Vital Signs  Temp:  [97.5 °F (36.4 °C)] 97.5 °F (36.4 °C)  Heart Rate:  [] 113  Resp:  [17-22] 17  BP: (110-129)/(57-65) 110/57      Physical Exam   Constitutional: He is oriented to person, place, and time. He appears well-developed. He appears ill. No distress.   HENT:   Head: Normocephalic and atraumatic.   Eyes: Pupils  are equal, round, and reactive to light. EOM are normal. No scleral icterus.   Neck: Normal range of motion. Neck supple. No JVD present. No thyromegaly present.   Cardiovascular: Regular rhythm and normal heart sounds.  Tachycardia present.  Exam reveals no gallop and no friction rub.    No murmur heard.  Pulmonary/Chest: Effort normal and breath sounds normal. He has no wheezes. He has no rales. He exhibits no tenderness.   Abdominal: Soft. Bowel sounds are normal. He exhibits distension and ascites. He exhibits no mass. There is no tenderness. There is no rebound and no guarding.   Musculoskeletal: He exhibits no edema, tenderness or deformity.   Neurological: He is alert and oriented to person, place, and time. No cranial nerve deficit. He exhibits normal muscle tone. Coordination normal.   Skin: Skin is warm and dry. No rash noted. He is not diaphoretic. No erythema. No pallor.   Psychiatric: He has a normal mood and affect. His behavior is normal. Judgment and thought content normal.   Nursing note and vitals reviewed.        Results Review:       Results from last 7 days  Lab Units 09/19/18  1136   SODIUM mmol/L 133*   POTASSIUM mmol/L 3.7   CHLORIDE mmol/L 98   CO2 mmol/L 24.0   BUN mg/dL 31*   CREATININE mg/dL 1.67*   GLUCOSE mg/dL 179*   CALCIUM mg/dL 8.4   BILIRUBIN mg/dL 2.0*   ALK PHOS U/L 292*   ALT (SGPT) U/L 204*   AST (SGOT) U/L 255*               Results from last 7 days  Lab Units 09/19/18  1136   WBC 10*3/mm3 16.62*   HEMOGLOBIN g/dL 9.2*   HEMATOCRIT % 25.9*   PLATELETS 10*3/mm3 163           Imaging Results (last 7 days)     Procedure Component Value Units Date/Time    US Paracentesis [[id number]] Collected:  09/19/18 1500    Specimen:  Body Fluid Updated:  09/19/18 1607    Narrative:         PROCEDURE: Ultrasound-guided paracentesis    COMPARISON: None    HISTORY: Ascites    TECHNIQUE:  The risks, benefits and alternatives were explained to the  patient who had ample opportunity to ask  questions. The patient  agreed to proceed and informed consent was obtained.    An adequate fluid pocket was identified in the right lower  quadrant. The site was marked then prepped and draped in sterile  fashion. Approximately 5 mL of 2% lidocaine solution was used for  local superficial and deep anesthesia. A 5 Iraqi Yueh needle was  inserted into the peritoneal cavity under sonographic guidance.    FINDINGS:   Approximately 7200 mL of bloody fluid was removed. There were no  immediate post procedure complications.      Impression:       CONCLUSION:    Successful ultrasound-guided paracentesis, as described above.    Electronically signed by:  Bowen Gusman MD  9/19/2018 4:06 PM CDT  Workstation: [id number]    XR Chest 1 View [[id number]] Collected:  09/19/18 1100     Updated:  09/19/18 1150    Narrative:       [facility] [place]    Patient Name: GOYO VARGAS JRCecy    ORDERING: MIKA AGUIAR     ATTENDING: MIKA AGUIAR     REFERRING: MIKA AGUIAR    -----------------------    PROCEDURE: Portable chest x-ray    TECHNIQUE: Single AP view of the chest    COMPARISON: 7/13/2018     HISTORY: sob    FINDINGS:     Life-support devices: None    Lungs/pleura: Clear    Heart, hilar and mediastinal structures:  Normal accounting for  projection and technique      Impression:       CONCLUSION:  No acute pulmonary disease.    Electronically signed by:  Bowen Gusman MD  9/19/2018 11:42 AM  CDT Workstation: [id number]          Assessment / Plan       Hospital Problem List:  Active Problems:  -  Anemia: Patient's hemoglobin did drop from a high of 12.4 twelve days ago to 9.2 today.  The anemia is  Normocytic in nature.  Will begin anemia workup to include LDH, iron profile and stool for Hemoccult.  Type, cross match and hold 2 units of packed red blood cells and transfuse if the need arises.  GI services are not available today.  Consult GI in a.m. or when the services are available.  -   Acute kidney injury: This  is likely prerenal.  Hold Aldactone, Lasix and give a dose of albumin.  Continue to monitor renal function and consult nephrologist if the need arises.  - Cirrhosis of the liver with recurrent ascites and paracentesis: Patient is status post ultrasound-guided paracentesis today.  Will empirically begin IV antibiotics to rule out spontaneous bacterial peritonitis in the light of leukocytosis with left shift.  Await outcome of ascitic fluid culture.  Leukocytosis is reactive and will be monitored.  Elevation of LFTs is chronic secondary to underlying cirrhosis.  Will continue to monitor.  - Paroxysmal atrial fibrillation: Patient is in sinus tachycardia.  Continue rate control medications.  He is not anticoagulated secondary to increasing risk of bleeding.  - Hypertension /GERD: Continue home medications.    - Begin DVT prophylaxis.  - Additional orders or treatment plans as hospital course dictates.        I discussed the patient's findings and my recommendations with patient and his grandchildren..     Alfred Dalal MD  09/19/18  5:27 PM        EMR Dragon/Transcription disclaimer:   Much of this encounter note is an electronic transcription/translation of spoken language to printed text. The electronic translation of spoken language may permit erroneous, or at times, nonsensical words or phrases to be inadvertently transcribed; Although I have reviewed the note for such errors, some may still exist.

## 2018-09-19 NOTE — ED NOTES
Pt upset that only receiving 5mg norco, Dr Frederick notified and verbal order additional norco.       Della Covington, DEBRA  09/19/18 4056

## 2018-09-19 NOTE — ED NOTES
Patient declined transport to [facility].  Patient requested something to eat and something for pain. Notified Dr. Frederick of patient's requests for pain medication and food.

## 2018-09-19 NOTE — ED PROVIDER NOTES
Subjective   Patient presents emergency Department with elevated heart rate.  There is some concern that the heart rate was in the 250 range.  It does not appear that the check things appropriately at the nursing home and sent to the emergency department.  His heart rate here was about 98 on arrival.  Patient does have some shortness of breath, which she tends to get when he needs a paracentesis.  Patient was scheduled for paracentesis today with Dr. Klein.  That has not happened as of yet.  Patient  describes no pain at this time, there is no fevers chills, no nausea vomiting or other systemic symptoms.  Patient is normotensive.  Patient has known atrial fibrillation with RVR.            Review of Systems   Constitutional: Negative.  Negative for appetite change, chills and fever.   HENT: Negative.  Negative for congestion.    Eyes: Negative.  Negative for photophobia and visual disturbance.   Respiratory: Positive for shortness of breath. Negative for cough and chest tightness.    Cardiovascular: Negative.  Negative for chest pain and palpitations.   Gastrointestinal: Negative.  Negative for abdominal pain, constipation, diarrhea, nausea and vomiting.   Endocrine: Negative.    Genitourinary: Negative.  Negative for decreased urine volume, dysuria, flank pain and hematuria.   Musculoskeletal: Negative.  Negative for arthralgias, back pain, myalgias, neck pain and neck stiffness.   Skin: Negative.  Negative for pallor.   Neurological: Negative.  Negative for dizziness, syncope, weakness, light-headedness, numbness and headaches.   Psychiatric/Behavioral: Negative.  Negative for confusion and suicidal ideas. The patient is not nervous/anxious.    All other systems reviewed and are negative.      Past Medical History:   Diagnosis Date   • Ascites     Paracentesis Performed 07/11/2017 and 07/17/2018   • GERD (gastroesophageal reflux disease)    • History of echocardiogram 05/04/2015    Echocardiogram W/ color flow  81488 (Low normal LV funciton with Ef of 50-55%. Normal RV size and funciton. Grade 1A diastolic dysfunction. No indication of left ventricular or left atrial thrombus. No sig. valvular regurg or stenosis.)   • Hyperlipidemia    • Hypertension    • Liver failure (CMS/HCC)        Allergies   Allergen Reactions   • Codeine    • Iodine    • Morphine And Related    • Penicillins      AND PENICILLIN G       Past Surgical History:   Procedure Laterality Date   • BACK SURGERY      Approximatly 20 Years Prior To Todays Clinical Appointment Dated 03/28/2017 In [place] [facility]       Family History   Problem Relation Age of Onset   • Heart attack Other    • Heart disease Other    • Stroke Other    • Hypertension Other    • Diabetes Other    • Lung cancer Other        Social History     Social History   • Marital status:      Social History Main Topics   • Smoking status: Never Smoker   • Smokeless tobacco: Current User     Types: Chew   • Alcohol use No   • Drug use: No   • Sexual activity: Defer     Other Topics Concern   • Not on file           Objective   Physical Exam   Constitutional: He appears well-developed and well-nourished. No distress.   HENT:   Head: Normocephalic and atraumatic.   Eyes: Conjunctivae and EOM are normal.   Neck: Normal range of motion. Neck supple. No JVD present.   Cardiovascular: Normal heart sounds and intact distal pulses.  Exam reveals no gallop and no friction rub.    No murmur heard.  Irregularly irregular, tachycardic.   Pulmonary/Chest: Effort normal. No respiratory distress. He has no wheezes. He has no rales. He exhibits no tenderness.   Abdominal: Soft. Bowel sounds are normal. He exhibits distension. He exhibits no mass. There is no tenderness. There is no rebound and no guarding.   Distended abdomen with dullness to percussion.   Musculoskeletal: Normal range of motion.   Neurological: He is alert.   Skin: Skin is warm and dry. Capillary refill takes less than 2 seconds.    Psychiatric: He has a normal mood and affect. His behavior is normal. Judgment and thought content normal.   Nursing note and vitals reviewed.      Procedures           ED Course      Labs Reviewed   COMPREHENSIVE METABOLIC PANEL - Abnormal; Notable for the following:        Result Value    Glucose 179 (*)     BUN 31 (*)     Creatinine 1.67 (*)     Sodium 133 (*)     Albumin 3.00 (*)     ALT (SGPT) 204 (*)     AST (SGOT) 255 (*)     Alkaline Phosphatase 292 (*)     Total Bilirubin 2.0 (*)     eGFR Non African Amer 39 (*)     Globulin 3.9 (*)     A/G Ratio 0.8 (*)     All other components within normal limits    Narrative:     The MDRD GFR formula is only valid for adults with stable renal function between ages 18 and 70.   CBC WITH AUTO DIFFERENTIAL - Abnormal; Notable for the following:     WBC 16.62 (*)     RBC 2.68 (*)     Hemoglobin 9.2 (*)     Hematocrit 25.9 (*)     MCH 34.3 (*)     RDW 15.5 (*)     RDW-SD 54.3 (*)     Neutrophil % 81.6 (*)     Lymphocyte % 5.2 (*)     Immature Grans % 0.7 (*)     Neutrophils, Absolute 13.57 (*)     Monocytes, Absolute 1.99 (*)     Immature Grans, Absolute 0.11 (*)     All other components within normal limits   URINALYSIS W/ MICROSCOPIC IF INDICATED (NO CULTURE) - Abnormal; Notable for the following:     Bilirubin, UA Small (1+) (*)     All other components within normal limits    Narrative:     Urine microscopic not indicated.   BODY FLUID CELL COUNT - Abnormal; Notable for the following:     WBC, Fluid 737 (*)     RBC, Fluid 400,500 (*)     Appearance, Fluid Cloudy (*)     All other components within normal limits   DIGOXIN LEVEL - Normal   BODY FLUID CELL COUNT WITH DIFFERENTIAL    Narrative:     The following orders were created for panel order Body Fluid Cell Count With Differential - Body Fluid, Peritoneum.  Procedure                               Abnormality         Status                     ---------                               -----------         ------                      Body fluid cell count - ...[[id number]]  Abnormal            Final result               Body fluid differential ...[[id number]]                      Final result                 Please view results for these tests on the individual orders.   BODY FLUID DIFFERENTIAL   CBC AND DIFFERENTIAL    Narrative:     The following orders were created for panel order CBC & Differential.  Procedure                               Abnormality         Status                     ---------                               -----------         ------                     CBC Auto Differential[[id number]]        Abnormal            Final result                 Please view results for these tests on the individual orders.   EXTRA TUBES    Narrative:     The following orders were created for panel order Extra Tubes.  Procedure                               Abnormality         Status                     ---------                               -----------         ------                     Light Blue Top[[id number]]                                                                Please view results for these tests on the individual orders.   LIGHT BLUE TOP       US Paracentesis   Final Result   CONCLUSION:     Successful ultrasound-guided paracentesis, as described above.      Electronically signed by:  Bowen Gusman MD  9/19/2018 4:06 PM CDT   Workstation: [id number]      XR Chest 1 View   Final Result   CONCLUSION:   No acute pulmonary disease.      Electronically signed by:  Bowen Gusman MD  9/19/2018 11:42 AM   CDT Workstation: [id number]        Patient with the anemia with acute renal injury and some tachycardia.  Patient having some intermittent desaturations though doing better with oxygen.  Patient with no clinical respiratory distress at this time.  Due to the patient's new anemia from his last blood count on 97 and the blood in his ascitic fluid that was drained, 7 L, we will observe the patient overnight with repeat CBC tomorrow.   Discussed the plan with the family and the patient to agree to the same.            MDM      Final diagnoses:   Anemia, unspecified type   Acute renal insufficiency   Tachycardia            Leonid Frederick MD  09/19/18 3819

## 2018-09-20 NOTE — ED NOTES
S/w Subha @ [facility] and asked if radial or apical pulse was obtained on pt before calling EMS, answer was no, they used a pulse oximeter.  Pt has gross tremors causing artifact that makes HR read in the 200's, radial pulse on arrival 98.  Pt alert and oriented, flirting with nurses.      Della Covington, RN  09/19/18 1928

## 2018-09-20 NOTE — CONSULTS
Adult Nutrition  Assessment    Patient Name:  Rodriguez Botello Jr.  YOB: 1934  MRN: [id number]  Admit Date:  9/19/2018    Assessment Date:  9/20/2018    Comments:  Pt is an 84 year old male with hx of cirrhosis admitted from nursing home with SOA and ascites.  Paracentesis performed.  Pt screened at nutrition risk.  Family reports pt does not eat much and has lost a significant amount of weight, though unable to determine how much due to fluid status.  Pt has poor dentition requiring chopped meats.  Family reports pt has not had BM in 2 days, lactulose given last night.  Rd will note pt preferences.  No supplements added given need for decreased protein with cirrhosis.  Will monitor.          Reason for Assessment     Row Name 09/20/18 0122          Reason for Assessment    Reason For Assessment nurse/nurse practitioner consult     Diagnosis liver disease     Identified At Risk by Screening Criteria MST SCORE 2+               Nutrition/Diet History     Row Name 09/20/18 1351          Nutrition/Diet History    Typical Food/Fluid Intake Pt reports fairly good appetite but family members present report he does not eat very much.  Needs chopped meats.     Food Preferences Likes roast beef and milk.               Labs/Tests/Procedures/Meds     Row Name 09/20/18 1359          Labs/Procedures/Meds    Lab Results Reviewed reviewed, pertinent        Medications    Pertinent Medications Reviewed reviewed, pertinent             Physical Findings     Row Name 09/20/18 1351          Physical Findings    Overall Physical Appearance generalized wasting     Gastrointestinal abdominal distension     Oral/Mouth Cavity poor dentition             Estimated/Assessed Needs     Row Name 09/20/18 3849          Calculation Measurements    Weight Used For Calculations 81.1 kg (178 lb 12.7 oz)        Estimated/Assessed Needs    Additional Documentation KCAL/KG (Group);Fluid Requirements (Group);Protein Requirements (Group)         KCAL/KG    14 Kcal/Kg (kcal) 1135.4     15 Kcal/Kg (kcal) 1216.5     18 Kcal/Kg (kcal) 1459.8     20 Kcal/Kg (kcal) 1622     25 Kcal/Kg (kcal) 2027.5     30 Kcal/Kg (kcal) 2433     35 Kcal/Kg (kcal) 2838.5     40 Kcal/Kg (kcal) 3244     45 Kcal/Kg (kcal) 3649.5     50 Kcal/Kg (kcal) 4055     kcal/kg (Specify) 20        Person-St. Jeor Equation    RMR (Person-St. Jeor Equation) 1491.38        Protein Requirements    Est Protein Requirement Amount (gms/kg) 0.6 gm protein     Estimated Protein Requirements (gms/day) 48.66        Fluid Requirements    Estimated Fluid Requirements (mL/day) 1500     Estimated Fluid Requirement Method other (see comments)     RDA Method (mL) 1500     Elham-Marie Method (over 20 kg) 3122             Nutrition Prescription Ordered     Row Name 09/20/18 1355          Nutrition Prescription PO    Current PO Diet Regular     Common Modifiers Cardiac             Evaluation of Received Nutrient/Fluid Intake     Row Name 09/20/18 1355 09/20/18 1354       Calculation Measurements    Weight Used For Calculations  -- 81.1 kg (178 lb 12.7 oz)       PO Evaluation    Number of Meals 2  --    % PO Intake 75,50  --            Evaluation of Prescribed Nutrient/Fluid Intake     Row Name 09/20/18 1354          Calculation Measurements    Weight Used For Calculations 81.1 kg (178 lb 12.7 oz)           Electronically signed by:  Indy Booth RD  09/20/18 1:57 PM

## 2018-09-20 NOTE — PROGRESS NOTES
The patient's hemoglobin has dropped significantly- it has been 12, earlier this month.  Also the hemoglobin yesterday was above 9 he is status post a paracentesis yesterday.  The Hgb this morning is at 7.4.  He is hypotensive with some tachycardia.  The patient was explained that he is needing a blood transfusion.  This is secondary to a hemoglobin below 8 status postop and is symptomatic with ongoing tachycardia even after fluids.  All risks and the the benefits were explained to the patient and the family at the bedside.  The risks include and are not limited to the following: Infection with HIV hepatitis and other blood-borne pathogens.  Also the potential for anaphylaxis and a reaction to the blood product.  Also the potential for a reaction at the infusion site.  All questions and concerns were answered.  The patient is a DNR but states that he is not wishing to be intubated.  The patient and the POA each agreed to the administration of the blood products and a blood transfusion.    EMR Dragon/Transcription disclaimer:   Much of this encounter note is an electronic transcription/translation of spoken language to printed text. The electronic translation of spoken language may permit erroneous, or at times, nonsensical words or phrases to be inadvertently transcribed; Although I have reviewed the note for such errors, some may still exist.

## 2018-09-20 NOTE — ED NOTES
While calling report to Beth  On 4w pt suddenly c/o sharp LLQ abd pain.  NOrco given , bp decreased to 89/50, pt asymptomatic with bp, family @ bs, asked by floor to monitor bp.       Della Covington, RN  09/19/18 6690

## 2018-09-20 NOTE — PLAN OF CARE
Problem: Patient Care Overview  Goal: Plan of Care Review  Outcome: Ongoing (interventions implemented as appropriate)   09/20/18 9090   Coping/Psychosocial   Plan of Care Reviewed With patient;family   Plan of Care Review   Progress no change   OTHER   Outcome Summary Pt has fair appetite. S/p paracentesis. Will send chopped meats due to poor dentition. Pt and family advised of food choices and preferences noted. Will not add supplements due to decreased protein need with liver dx.

## 2018-09-20 NOTE — ED NOTES
"Dr Orozco called (after speaking with Dr Morocho previously) and states orders in place.  Orders were signed and held under \"other\" and did not show up on ED worklist.  Pt on way to 4W, released orders and requested albumin sent to 4W.  Changed pt to lab collect and called lab to draw cultures on 4W stat.       Della Covington, RN  09/19/18 1949    "

## 2018-09-21 NOTE — PROGRESS NOTES
[facility] Medicine Services  INPATIENT PROGRESS NOTE    Length of Stay: 0  Date of Admission: 9/19/2018  Primary Care Physician: Carl Hanks MD    Subjective   Chief Complaint: Rapid Heart beat    HPI: Patient is a gentleman who has cirrhosis with recurrent ascites and paracentesis, end-stage cirrhosis, anemia.  His hemoglobin has dropped from 12-9.2 to now on 7.4 and a course of 13 days.  The patient has a elevated heart rate with low blood pressure.  There is concern that he might have a bleed somewhere.  After speaking to the family, the POA, the patient they would like to have a blood transfusion.  I recommended blood transfusion secondary to his ongoing drop in hemoglobin with elevated heart rate and slightly low blood pressure.  Please see my note for the risks and the benefits that were explained to the patient.     Constitutional: positive for weakness, fatigue.  HENT: Negative for congestion, ear discharge, ear pain, hearing loss, rhinorrhea, sneezing, sore throat and trouble swallowing.    Eyes: Negative for photophobia, pain, discharge and visual disturbance.   Respiratory: Negative for cough, chest tightness, shortness of breath and wheezing.    Cardiovascular: Negative for chest pain and palpitations.   Gastrointestinal: Positive for abdomen pain, abdomen swelling.  Endocrine: Negative for polydipsia and polyuria.   Genitourinary: Negative for difficulty urinating, dysuria, frequency, hematuria and urgency.   Skin: Negative for rash.   Neurological: Negative for dizziness, syncope, weakness, light-headedness,   numbness and headaches.       Objective      Physical Exam:    Temp:  [97 °F (36.1 °C)-99.1 °F (37.3 °C)] 97.5 °F (36.4 °C)  Heart Rate:  [] 91  Resp:  [16-18] 18  BP: ()/(52-62) 111/59    General: Ill-appearing , uncomfortable , no acute distress.    HEENT: Head: Normocephalic and atraumatic. Right Ear: External ear normal.   Left Ear:  External ear normal. Nose: Nose normal. Mouth/Throat:   Oropharynx is clear and moist.   Eyes: Conjunctivae and EOM are normal. Pupils are equal, round, and reactive   to light. Right eye exhibits no discharge. Left eye exhibits no discharge.   Neck: Normal range of motion. Neck supple. No JVD present. No tracheal deviation present. No thyromegaly present.   heart: Rate 112, regular rhythm, normal heart sounds and intact distal pulses.    Exam reveals no gallop and no friction rub. No murmur heard.  Pulmonary: Effort normal and breath sounds normal. No stridor.   No respiratory distress. He has no wheezes. No rales or tenderness.   Abdominal: Status post paracentesis with bandage in place.   Musculoskeletal: No cyanosis, clubbing or edema.      Results Review:  I have reviewed the labs, radiology results, and diagnostic studies.    Laboratory Data:     Results from last 7 days  Lab Units 09/20/18  0635 09/19/18  1136   SODIUM mmol/L 131* 133*   POTASSIUM mmol/L 4.1 3.7   CHLORIDE mmol/L 99 98   CO2 mmol/L 23.0 24.0   BUN mg/dL 37* 31*   CREATININE mg/dL 1.67* 1.67*   GLUCOSE mg/dL 154* 179*   CALCIUM mg/dL 7.8* 8.4   BILIRUBIN mg/dL 1.9* 2.0*   ALK PHOS U/L 215* 292*   ALT (SGPT) U/L 143* 204*   AST (SGOT) U/L 135* 255*   ANION GAP mmol/L 9.0 11.0     Estimated Creatinine Clearance: 37.8 mL/min (A) (by C-G formula based on SCr of 1.67 mg/dL (H)).            Results from last 7 days  Lab Units 09/20/18  0635 09/19/18  1136   WBC 10*3/mm3 13.89* 16.62*   HEMOGLOBIN g/dL 7.4* 9.2*   HEMATOCRIT % 21.2* 25.9*   PLATELETS 10*3/mm3 118* 163       Results from last 7 days  Lab Units 09/20/18  0635 09/19/18  1902   INR  1.76* 1.69*       Culture Data:   Blood Culture   Date Value Ref Range Status   09/19/2018 No growth at 24 hours  Preliminary   09/19/2018 No growth at 24 hours  Preliminary     No results found for: URINECX  No results found for: RESPCX  No results found for: WOUNDCX  No results found for: STOOLCX  No  components found for: BODYFLD    Radiology Data:   Imaging Results (last 24 hours)     ** No results found for the last 24 hours. **          I have reviewed the patient's current medications.     Assessment/Plan     Hospital Problem List     Anemia    Acute kidney injury (CMS/HCC)        Assessment/Plan:   Anemia , unspecified  Cirrhosis with recurrent ascites status post paracentesis  Sinus tachycardia with history of atrial fibrillation      Had a very detailed discussion with the family regarding his possible bleeding and drop in hemoglobin.  At this stage there is concern that he could have a possible varices related to his cirrhosis.  We'll transfuse 2 units and continue to check his hemoglobin.  He is status post paracentesis.  At this stage she is end-stage cirrhosis.  He is likely a poor candidate for any further intervention to evaluate a possible bleed.  As a result, I will contact GI, to provide any recommendations.  For now we will continue to monitor hemoglobin level.     The pressure and heart rate have improved, status post transfusion.                  Discharge Planning: I in process    Alfredo Zendejas, DO

## 2018-09-21 NOTE — PLAN OF CARE
Problem: Patient Care Overview  Goal: Plan of Care Review  Outcome: Ongoing (interventions implemented as appropriate)   09/21/18 0021   Coping/Psychosocial   Plan of Care Reviewed With patient   Plan of Care Review   Progress no change   OTHER   Outcome Summary Patient resting in bed. VS stable at this time. Second unit of blood completed , H&H to be redrawn at 0300. Patient continues to be pale in color. No c/o pain or s/s of distress at this time. Will continue to monitor this patient.      Goal: Discharge Needs Assessment  Outcome: Ongoing (interventions implemented as appropriate)      Problem: Fall Risk (Adult)  Goal: Identify Related Risk Factors and Signs and Symptoms  Outcome: Ongoing (interventions implemented as appropriate)    Goal: Absence of Fall  Outcome: Ongoing (interventions implemented as appropriate)      Problem: Skin Injury Risk (Adult)  Goal: Identify Related Risk Factors and Signs and Symptoms  Outcome: Ongoing (interventions implemented as appropriate)    Goal: Skin Health and Integrity  Outcome: Ongoing (interventions implemented as appropriate)      Problem: Anemia (Adult)  Goal: Identify Related Risk Factors and Signs and Symptoms  Outcome: Ongoing (interventions implemented as appropriate)    Goal: Symptom Improvement  Outcome: Ongoing (interventions implemented as appropriate)

## 2018-09-21 NOTE — PROGRESS NOTES
[facility] HOSPITALIST PROGRESS NOTE     Patient Identification:  Name:  Rodriguez Botello Jr.  Age:  84 y.o.  Sex:  male  :  1934  MRN:  [id number]  Visit Number:  [id number]  Primary Care Provider:  Carl Hanks MD    Length of stay:  1    Chief complaint:  Denies any pain    Subjective:  Patient is a gentleman who has cirrhosis with recurrent ascites and paracentesis, end-stage cirrhosis, anemia.  His hemoglobin has dropped from 12-9.2 to now on 7.4 and a course of 13 days.  The patient had  elevated heart rate with low blood pressure.  There is concern that he might have a bleed somewhere. He was transfused prbc. GI will be consulted. He has no complains and is eating well.  ----------------------------------------------------------------------------------------------------------------------  Current Hospital Meds:    busPIRone 5 mg Oral TID   ceftriaxone 1 g Intravenous Q24H   digoxin 125 mcg Oral Daily   diltiaZEM  mg Oral Daily   famotidine 20 mg Oral QAM   fluticasone 2 spray Nasal Daily   lactulose 10 g Oral Daily   PARoxetine 10 mg Oral QAM   risperiDONE 3 mg Oral Nightly       sodium chloride 50 mL/hr Last Rate: 50 mL/hr (18 0359)     ----------------------------------------------------------------------------------------------------------------------  Vital Signs:  Temp:  [97 °F (36.1 °C)-98.4 °F (36.9 °C)] 98.2 °F (36.8 °C)  Heart Rate:  [] 98  Resp:  [18] 18  BP: ()/(55-65) 110/59  1    18  1103 18  0641   Weight: 81.1 kg (178 lb 12.8 oz) 64.8 kg (142 lb 12.8 oz)     Body mass index is 21.09 kg/m².    Intake/Output Summary (Last 24 hours) at 18 1550  Last data filed at 18 1228   Gross per 24 hour   Intake          2563.46 ml   Output              400 ml   Net          2163.46 ml     Diet Soft Texture; Chopped;  Cardiac  ----------------------------------------------------------------------------------------------------------------------  Physical exam:  Constitutional:  Elderly male.  No respiratory distress.      HENT:  Head:  Normocephalic and atraumatic.  Mouth:  Dry mucous membranes.    Eyes:  Conjunctivae and EOM are normal.  Pupils are equal, round, and reactive to light.  No scleral icterus.    Neck:  Neck supple.  No JVD present.    Cardiovascular:  Normal rate,irregular rhythm and normal heart sounds with no murmur.  Pulmonary/Chest:  No respiratory distress, no wheezes, no crackles. Diminishedbreath sounds and air movement.Emphysematous chest.  Abdominal:  Soft.  Bowel sounds are normal. Distended and no tenderness.   Musculoskeletal:  No edema, no tenderness, and no deformity.  No red or swollen joints anywhere. Muscle atrophy   Neurological:  Alert  No cranial nerve deficit.  No tongue deviation.  No facial droop.  No slurred speech.   Skin:  Skin is warm and dry. No rash noted. No pallor.   Peripheral vascular:  Strong pulses in all 4 extremities with no clubbing, no cyanosis, no edema.  ----------------------------------------------------------------------------------------------------------------------  Tele:    ----------------------------------------------------------------------------------------------------------------------        Results from last 7 days  Lab Units 09/21/18  1131 09/21/18  0401 09/20/18  0635 09/19/18  1902 09/19/18  1136   LACTATE mmol/L  --   --  1.8  --   --    WBC 10*3/mm3  --  14.51* 13.89*  --  16.62*   HEMOGLOBIN g/dL 10.6* 10.5* 7.4*  --  9.2*   HEMATOCRIT % 29.7* 29.2* 21.2*  --  25.9*   MCV fL  --  92.1 95.5  --  96.6   MCHC g/dL  --  36.0 34.9  --  35.5   PLATELETS 10*3/mm3  --  120* 118*  --  163   INR   --  1.73* 1.76* 1.69*  --            Results from last 7 days  Lab Units 09/21/18  0401 09/20/18  0635 09/19/18  1136   SODIUM mmol/L 127* 131* 133*   POTASSIUM mmol/L 3.5  4.1 3.7   CHLORIDE mmol/L 97 99 98   CO2 mmol/L 23.0 23.0 24.0   BUN mg/dL 39* 37* 31*   CREATININE mg/dL 1.60* 1.67* 1.67*   EGFR IF NONAFRICN AM mL/min/1.73 41* 39* 39*   CALCIUM mg/dL 7.6* 7.8* 8.4   GLUCOSE mg/dL 162* 154* 179*   ALBUMIN g/dL 2.30* 2.50* 3.00*   BILIRUBIN mg/dL 2.1* 1.9* 2.0*   ALK PHOS U/L 226* 215* 292*   AST (SGOT) U/L 78* 135* 255*   ALT (SGPT) U/L 123* 143* 204*   Estimated Creatinine Clearance: 31.5 mL/min (A) (by C-G formula based on SCr of 1.6 mg/dL (H)).    No results found for: AMMONIA      Blood Culture   Date Value Ref Range Status   09/19/2018 No growth at 24 hours  Preliminary   09/19/2018 No growth at 24 hours  Preliminary                I have personally looked at the labs and they are summarized above.  ----------------------------------------------------------------------------------------------------------------------  Imaging Results (last 24 hours)     ** No results found for the last 24 hours. **        ----------------------------------------------------------------------------------------------------------------------  Assessment and Plan:  Anemia , unspecified likely due to GI bleed  S/p 2U prbc transfusion. Hb now 10.4 from 7.4    Probable GI bleed in setting of liver cirrhosis ?variceal bleed  Consult GI    Cirrhosis with recurrent ascites status post paracentesis  Continue lactulose  Sinus tachycardia with history of atrial fibrillation  Continue cardizem.    Hyponatremia hypervolemic  Asymptomatic monitor.     Thrombocytopenia   monitor.    Plan of care discussed with the daughter.    The patient is high risk due to the following diagnoses/reasons his age and liver cirrhosis    Bello Chadwick MD  09/21/18  3:50 PM

## 2018-09-22 NOTE — CONSULTS
Francis Spaulding DO,[facility]  Gastroenterology  Hepatology  Endoscopy  Board Certified in Internal Medicine and gastroenterology  [address], [address]  [place]. [address]  - [phone]   F - [phone]     GASTROENTEROLOGY CONSULT NOTE   FRANCIS SPAULDING DO.         SUBJECTIVE:   9/21/2018    Name: Rodriguez Botello Jr.  DOD: 1934    REASON FOR CONSULT: Anemia with decline in hemoglobin requiring blood transfusion.  History of advanced cirrhosis the liver.    Chief Complaint:     Chief Complaint   Patient presents with   • Rapid Heart Rate       Subjective : Anemia with no evidence of any active ongoing gastrointestinal bleeding that is evident except for occult blood in the stools.    Patient is 84 y.o. male, personal history of advanced cirrhosis of liver complicated by ascites requiring paracentesis , presents with increasing dyspnea and abdominal distention.  The patient has had recurrent paracentesis that have been performed.  The last one was September 7.  The patient had at that time any ascitic fluid that was removed.  The patient has been found to have a decline in the hemoglobin that has risen back up after blood transfusion.  There has been no visible vomiting of blood.  The patient's family and I am not sure about any prior endoscopy.  They have specifically told me that they do not want endoscopy.  Mrs. Gant is his daughter.  She says that she feels that endoscopy in his weakened state may cause him to die..      ROS/HISTORY/ CURRENT MEDICATIONS/OBJECTIVE/VS/PE:   Review of Systems:   Review of Systems   Constitutional: Positive for activity change, appetite change, fatigue and unexpected weight change.   HENT: Positive for trouble swallowing and voice change.    Eyes: Negative.    Respiratory: Positive for cough, choking, chest tightness and shortness of breath.    Cardiovascular: Positive for chest pain, palpitations and leg swelling.   Gastrointestinal: Positive for abdominal  distention and nausea.   Endocrine: Positive for cold intolerance and heat intolerance.   Genitourinary: Negative.    Musculoskeletal: Positive for arthralgias, back pain and myalgias.   Skin: Positive for color change and pallor.   Allergic/Immunologic: Positive for immunocompromised state.   Neurological: Positive for dizziness, weakness and light-headedness.   Hematological: Bruises/bleeds easily.   Psychiatric/Behavioral: Positive for agitation, confusion and hallucinations. The patient is nervous/anxious.        History:     Past Medical History:   Diagnosis Date   • Ascites     Paracentesis Performed 07/11/2017 and 07/17/2018   • GERD (gastroesophageal reflux disease)    • History of echocardiogram 05/04/2015    Echocardiogram W/ color flow 32970 (Low normal LV funciton with Ef of 50-55%. Normal RV size and funciton. Grade 1A diastolic dysfunction. No indication of left ventricular or left atrial thrombus. No sig. valvular regurg or stenosis.)   • Hyperlipidemia    • Hypertension    • Liver failure (CMS/HCC)      Past Surgical History:   Procedure Laterality Date   • BACK SURGERY      Approximatly 20 Years Prior To [facility] Clinical Appointment Dated 03/28/2017 In [facility]     Family History   Problem Relation Age of Onset   • Heart attack Other    • Heart disease Other    • Stroke Other    • Hypertension Other    • Diabetes Other    • Lung cancer Other      Social History   Substance Use Topics   • Smoking status: Never Smoker   • Smokeless tobacco: Current User     Types: Chew   • Alcohol use No     Prescriptions Prior to Admission   Medication Sig Dispense Refill Last Dose   • acetaminophen (TYLENOL) 500 MG tablet Take 500 mg by mouth Every 4 (Four) Hours As Needed for Mild Pain  or Fever (VAS 1-4).   Taking   • busPIRone (BUSPAR) 5 MG tablet Take 5 mg by mouth 3 (Three) Times a Day.   9/19/2018 at Unknown time   • digoxin (LANOXIN) 125 MCG tablet Take 125 mcg by mouth Daily. In A.M.   9/19/2018 at  Unknown time   • diltiaZEM CD (CARDIZEM CD) 120 MG 24 hr capsule Take 120 mg by mouth Daily. Every Morning Before Meal For Heart Or To Lower Blood Pressure   9/19/2018 at Unknown time   • doxycycline (MONODOX) 50 MG capsule Take 100 mg by mouth 2 (Two) Times a Day.      • famotidine (PEPCID) 20 MG tablet Take 20 mg by mouth Every Morning.   9/19/2018 at Unknown time   • fluticasone (FLONASE) 50 MCG/ACT nasal spray 2 sprays into each nostril Daily.   9/19/2018 at Unknown time   • furosemide (LASIX) 40 MG tablet Take 40 mg by mouth Every Morning.   9/19/2018 at Unknown time   • HYDROcodone-acetaminophen (NORCO) 5-325 MG per tablet Take 1 tablet by mouth Every 8 (Eight) Hours As Needed for Moderate Pain  (VAS 5-10).   Taking   • LACTULOSE PO 15 cc by mouth at night   9/18/2018 at Unknown time   • PARoxetine (PAXIL) 10 MG tablet Take 10 mg by mouth Every Morning.   9/19/2018 at Unknown time   • risperiDONE (risperDAL) 3 MG tablet Take 3 mg by mouth Every Night.   9/18/2018 at Unknown time   • spironolactone (ALDACTONE) 50 MG tablet Take 1 tablet by mouth Daily.   9/19/2018 at Unknown time     Allergies:  Codeine; Penicillins; Iodine; Morphine and related; and Latex    I have reviewed the patient's medical history, surgical history and family history in the available medical record system.     Current Medications:     Current Facility-Administered Medications   Medication Dose Route Frequency Provider Last Rate Last Dose   • acetaminophen (TYLENOL) tablet 500 mg  500 mg Oral Q4H PRN Alfred Dalal MD   500 mg at 09/20/18 1710   • busPIRone (BUSPAR) half tablet 5 mg  5 mg Oral TID Alfred Dalal MD   5 mg at 09/21/18 1530   • cefTRIAXone (ROCEPHIN) 1 g/100 mL 0.9% NS (MBP)  1 g Intravenous Q24H Alfred Dalal MD 0 mL/hr at 09/19/18 2215 1 g at 09/20/18 2127   • digoxin (LANOXIN) tablet 125 mcg  125 mcg Oral Daily Alfred Dalal MD   125 mcg at 09/21/18 1228   • diltiaZEM CD (CARDIZEM CD) 24 hr capsule  120 mg  120 mg Oral Daily Alfred Dalal MD   120 mg at 09/21/18 0817   • famotidine (PEPCID) tablet 20 mg  20 mg Oral Alfred Elizondo MD   20 mg at 09/21/18 0615   • fluticasone (FLONASE) 50 MCG/ACT nasal spray 2 spray  2 spray Nasal Daily Alfred Dalal MD   2 spray at 09/21/18 0817   • HYDROcodone-acetaminophen (NORCO) 5-325 MG per tablet 1 tablet  1 tablet Oral Q8H PRN Alfred Dalal MD   1 tablet at 09/21/18 1511   • lactulose (CHRONULAC) 10 GM/15ML solution 10 g  10 g Oral Daily Alfred Dalal MD   10 g at 09/20/18 2052   • ondansetron (ZOFRAN) injection 4 mg  4 mg Intravenous Q6H PRN Alfred Dalal MD       • PARoxetine (PAXIL) tablet 10 mg  10 mg Oral Alfred Elizondo MD   10 mg at 09/21/18 0615   • risperiDONE (risperDAL) tablet 3 mg  3 mg Oral Nightly Alfred Dalal MD   3 mg at 09/20/18 2052   • sodium chloride 0.9 % flush 1-10 mL  1-10 mL Intravenous PRN Alfred Dalal MD       • sodium chloride 0.9 % flush 10 mL  10 mL Intravenous PRN Leonid Frederick MD   10 mL at 09/19/18 1142   • sodium chloride 0.9 % infusion  50 mL/hr Intravenous Continuous Alfred Dalal MD 50 mL/hr at 09/21/18 0359 50 mL/hr at 09/21/18 0359       Objective     Physical Exam:   Temp:  [97 °F (36.1 °C)-98.2 °F (36.8 °C)] 98.2 °F (36.8 °C)  Heart Rate:  [] 97  Resp:  [18] 18  BP: ()/(58-65) 110/59    Physical Exam:  General Appearance:    Alert, cooperative, in no acute distress.  Severe protein calorie malnutrition with temporal wasting    Head:    Normocephalic, without obvious abnormality, atraumatic   Eyes:            Lids and lashes normal, conjunctivae and sclerae normal, no   icterus, no pallor, corneas clear, PERRLA   Ears:    Ears appear intact with no abnormalities noted   Throat:   No oral lesions, no thrush, oral mucosa moist   Neck:   No adenopathy, supple, trachea midline, no thyromegaly, no     carotid bruit, no JVD   Back:     No kyphosis present, no  scoliosis present, no skin lesions,       erythema or scars, no tenderness to percussion or                   palpation,   range of motion normal   Lungs:   Decreased breath sounds to the bases    Heart:    Regular rhythm and normal rate, normal S1 and S2, no            murmur, no gallop, no rub, no click   Breast Exam:    Deferred   Abdomen:   Significant ascites    Genitalia:    Deferred   Extremities:   Moves all extremities well, 2+ edema, no cyanosis, no              redness   Pulses:   Pulses palpable and equal bilaterally   Skin:   No bleeding, bruising or rash   Lymph nodes:   No palpable adenopathy   Neurologic:   Cranial nerves 2 - 12 grossly intact, sensation intact, DTR        present and equal bilaterally.  No spontaneous asterixis       Results Review:     Lab Results   Component Value Date    WBC 14.51 (H) 09/21/2018    WBC 13.89 (H) 09/20/2018    WBC 16.62 (H) 09/19/2018    HGB 11.0 (L) 09/21/2018    HGB 10.6 (L) 09/21/2018    HGB 10.5 (L) 09/21/2018    HCT 30.6 (L) 09/21/2018    HCT 29.7 (L) 09/21/2018    HCT 29.2 (L) 09/21/2018     (L) 09/21/2018     (L) 09/20/2018     09/19/2018       Results from last 7 days  Lab Units 09/21/18  0401 09/20/18  0635 09/19/18  1136   ALK PHOS U/L 226* 215* 292*   ALT (SGPT) U/L 123* 143* 204*   AST (SGOT) U/L 78* 135* 255*       Results from last 7 days  Lab Units 09/21/18  0401 09/20/18  0635 09/19/18  1136   BILIRUBIN mg/dL 2.1* 1.9* 2.0*   ALK PHOS U/L 226* 215* 292*     No results found for: LIPASE  Lab Results   Component Value Date    INR 1.73 (H) 09/21/2018    INR 1.76 (H) 09/20/2018    INR 1.69 (H) 09/19/2018          Radiology Review:  Imaging Results (last 72 hours)     Procedure Component Value Units Date/Time    US Paracentesis [[id number]] Collected:  09/19/18 1500    Specimen:  Body Fluid Updated:  09/19/18 1652    Narrative:         PROCEDURE: Ultrasound-guided paracentesis    COMPARISON: None    HISTORY:  Ascites    TECHNIQUE:  The risks, benefits and alternatives were explained to the  patient who had ample opportunity to ask questions. The patient  agreed to proceed and informed consent was obtained.    An adequate fluid pocket was identified in the right lower  quadrant. The site was marked then prepped and draped in sterile  fashion. Approximately 5 mL of 2% lidocaine solution was used for  local superficial and deep anesthesia. A 5 Croatian Yueh needle was  inserted into the peritoneal cavity under sonographic guidance.    FINDINGS:   Approximately 7200 mL of bloody fluid was removed. There were no  immediate post procedure complications.      Impression:       CONCLUSION:    Successful ultrasound-guided paracentesis, as described above.    Electronically signed by:  Bowen Gusman MD  9/19/2018 4:06 PM CDT  Workstation: [id number]    XR Chest 1 View [[id number]] Collected:  09/19/18 1100     Updated:  09/19/18 1150    Narrative:       [facility]    Patient Name: GOYO VARGAS JR.    ORDERING: MIKA AGUIAR     ATTENDING: MIKA AGUIAR     REFERRING: MIKA AGUIAR    -----------------------    PROCEDURE: Portable chest x-ray    TECHNIQUE: Single AP view of the chest    COMPARISON: 7/13/2018     HISTORY: sob    FINDINGS:     Life-support devices: None    Lungs/pleura: Clear    Heart, hilar and mediastinal structures:  Normal accounting for  projection and technique      Impression:       CONCLUSION:  No acute pulmonary disease.    Electronically signed by:  Bowen Gusman MD  9/19/2018 11:42 AM  CDT Workstation: [id number]           I reviewed the patient's new clinical results.  I reviewed the patient's new imaging results and agree with the interpretation.     ASSESSMENT/PLAN:     1. Anemia secondary to chronic blood loss.  This could be related to portal congestive gastropathy or bleeding due to blood in the peritoneum  2.  Bloody peritoneal fluid.  Consider hepatocellular carcinoma or portal  vein thrombosis  3.  Cirrhosis, complicated by ascites, poorly controlled and hepatic encephalopathy.  Con classification C.  Meld score 27    Plan  1.  Offered endoscopy.  Daughter says that she feels that there will be death that does occur if that is done.  I cannot disagree with that statement  2.  Paracentesis as needed  3.  Alpha-fetoprotein  4.  Dr. Klein will assume patient's care on Monday.       Daryl Mcgowan,   09/21/18  7:03 PM

## 2018-09-22 NOTE — PLAN OF CARE
Problem: Patient Care Overview  Goal: Plan of Care Review  Outcome: Ongoing (interventions implemented as appropriate)   09/22/18 4073   Coping/Psychosocial   Plan of Care Reviewed With patient   Plan of Care Review   Progress no change   OTHER   Outcome Summary Pt c/o abd pain, v/s stable, hbg is 10.5, will continue to monitor      Goal: Individualization and Mutuality  Outcome: Ongoing (interventions implemented as appropriate)    Goal: Discharge Needs Assessment  Outcome: Ongoing (interventions implemented as appropriate)    Goal: Interprofessional Rounds/Family Conf  Outcome: Ongoing (interventions implemented as appropriate)      Problem: Fall Risk (Adult)  Goal: Absence of Fall  Outcome: Ongoing (interventions implemented as appropriate)      Problem: Skin Injury Risk (Adult)  Goal: Skin Health and Integrity  Outcome: Ongoing (interventions implemented as appropriate)      Problem: Anemia (Adult)  Goal: Symptom Improvement  Outcome: Ongoing (interventions implemented as appropriate)

## 2018-09-22 NOTE — PLAN OF CARE
Problem: Patient Care Overview  Goal: Plan of Care Review  Outcome: Ongoing (interventions implemented as appropriate)   09/22/18 0244   Coping/Psychosocial   Plan of Care Reviewed With patient   Plan of Care Review   Progress no change   OTHER   Outcome Summary Pt resting, hbg stable at 11, no complints or s/s of distress noted, will continue to monitor pt      Goal: Individualization and Mutuality  Outcome: Ongoing (interventions implemented as appropriate)    Goal: Discharge Needs Assessment  Outcome: Ongoing (interventions implemented as appropriate)    Goal: Interprofessional Rounds/Family Conf  Outcome: Ongoing (interventions implemented as appropriate)      Problem: Fall Risk (Adult)  Goal: Identify Related Risk Factors and Signs and Symptoms  Outcome: Outcome(s) achieved Date Met: 09/22/18    Goal: Absence of Fall  Outcome: Ongoing (interventions implemented as appropriate)      Problem: Skin Injury Risk (Adult)  Goal: Identify Related Risk Factors and Signs and Symptoms  Outcome: Outcome(s) achieved Date Met: 09/22/18    Goal: Skin Health and Integrity  Outcome: Ongoing (interventions implemented as appropriate)      Problem: Anemia (Adult)  Goal: Identify Related Risk Factors and Signs and Symptoms  Outcome: Outcome(s) achieved Date Met: 09/22/18

## 2018-09-22 NOTE — PROGRESS NOTES
[facility] HOSPITALIST PROGRESS NOTE     Patient Identification:  Name:  Rodriguez Botello Jr.  Age:  84 y.o.  Sex:  male  :  1934  MRN:  [id number]  Visit Number:  [id number]  Primary Care Provider:  Carl Hanks MD    Length of stay:  2    Chief complaint: Has no complaint. Daughter not around.    Subjective: Patient is a gentleman who has cirrhosis with recurrent ascites and paracentesis, end-stage cirrhosis, anemia.  His hemoglobin has dropped from 12-9.2 to now on 7.4 in a course of 13 days.  The patient had  elevated heart rate with low blood pressure.  There was concern that he might have a bleed somewhere. He was transfused prbc.  His hemoglobin has remained stable in the last 24 hours.  GI has seen patient and recommendation appreciated.  Family does not want anything invasive for now.  When seen this morning patient denies any pain and any shortness of breath.  ----------------------------------------------------------------------------------------------------------------------  Current Hospital Meds:    busPIRone 5 mg Oral TID   ceftriaxone 1 g Intravenous Q24H   digoxin 125 mcg Oral Daily   diltiaZEM  mg Oral Daily   famotidine 20 mg Oral QAM   fluticasone 2 spray Nasal Daily   lactulose 10 g Oral Daily   PARoxetine 10 mg Oral QAM   risperiDONE 3 mg Oral Nightly       sodium chloride 50 mL/hr Last Rate: 50 mL/hr (18 0243)     ----------------------------------------------------------------------------------------------------------------------  Vital Signs:  Temp:  [96.4 °F (35.8 °C)-98.2 °F (36.8 °C)] 97.5 °F (36.4 °C)  Heart Rate:  [] 94  Resp:  [16-18] 16  BP: (109-128)/(58-71) 109/71  1    18  1103 18  0641 18  0554   Weight: 81.1 kg (178 lb 12.8 oz) 64.8 kg (142 lb 12.8 oz) 65.2 kg (143 lb 12.8 oz)     Body mass index is 21.24 kg/m².    Intake/Output Summary (Last 24 hours) at 18 1128  Last data filed at 18 0970   Gross  per 24 hour   Intake          1906.67 ml   Output              575 ml   Net          1331.67 ml     Diet Soft Texture; Chopped; Cardiac  ----------------------------------------------------------------------------------------------------------------------  Physical exam:  Constitutional:  Elderly male.  No respiratory distress.      HENT:  Head:  Normocephalic and atraumatic.  Mouth:  Dry mucous membranes.    Eyes:  Conjunctivae and EOM are normal.  Pupils are equal, round, and reactive to light.  No scleral icterus.    Neck:  Neck supple.  No JVD present.    Cardiovascular:  Normal rate,irregular rhythm and normal heart sounds with no murmur.  Pulmonary/Chest:  No respiratory distress, no wheezes, no crackles. Diminishedbreath sounds and air movement.Emphysematous chest.  Abdominal:  Soft.  Bowel sounds are normal. Distended and no tenderness.   Musculoskeletal:  No edema, no tenderness, and no deformity.  No red or swollen joints anywhere. Muscle atrophy   Neurological:  Alert  No cranial nerve deficit.  No tongue deviation.  No facial droop.  No slurred speech.   Skin:  Skin is warm and dry. No rash noted. No pallor.   Peripheral vascular:  Strong pulses in all 4 extremities with no clubbing, no cyanosis, no edema.  Psych.  Very friendly with normal mood and affect  ----------------------------------------------------------------------------------------------------------------------  Tele:    ----------------------------------------------------------------------------------------------------------------------        Results from last 7 days  Lab Units 09/22/18  0532 09/21/18  1808 09/21/18  1131 09/21/18  0401 09/20/18  0635 09/19/18  1902   LACTATE mmol/L  --   --   --   --  1.8  --    WBC 10*3/mm3 12.47*  --   --  14.51* 13.89*  --    HEMOGLOBIN g/dL 10.5* 11.0* 10.6* 10.5* 7.4*  --    HEMATOCRIT % 29.5* 30.6* 29.7* 29.2* 21.2*  --    MCV fL 92.5  --   --  92.1 95.5  --    MCHC g/dL 35.6  --   --  36.0 34.9  --     PLATELETS 10*3/mm3 128*  --   --  120* 118*  --    INR  1.47*  --   --  1.73* 1.76* 1.69*           Results from last 7 days  Lab Units 09/22/18  0532 09/21/18  0401 09/20/18  0635   SODIUM mmol/L 130* 127* 131*   POTASSIUM mmol/L 3.9 3.5 4.1   CHLORIDE mmol/L 101 97 99   CO2 mmol/L 23.0 23.0 23.0   BUN mg/dL 37* 39* 37*   CREATININE mg/dL 1.51* 1.60* 1.67*   EGFR IF NONAFRICN AM mL/min/1.73 44 41* 39*   CALCIUM mg/dL 7.6* 7.6* 7.8*   GLUCOSE mg/dL 174* 162* 154*   ALBUMIN g/dL 2.20* 2.30* 2.50*   BILIRUBIN mg/dL 1.4* 2.1* 1.9*   ALK PHOS U/L 225* 226* 215*   AST (SGOT) U/L 45 78* 135*   ALT (SGPT) U/L 91* 123* 143*   Estimated Creatinine Clearance: 33.6 mL/min (A) (by C-G formula based on SCr of 1.51 mg/dL (H)).    No results found for: AMMONIA      Blood Culture   Date Value Ref Range Status   09/19/2018 No growth at 2 days  Preliminary   09/19/2018 No growth at 2 days  Preliminary                I have personally looked at the labs and they are summarized above.  ----------------------------------------------------------------------------------------------------------------------  Imaging Results (last 24 hours)     ** No results found for the last 24 hours. **        ----------------------------------------------------------------------------------------------------------------------  Assessment and Plan:  Anemia , unspecified likely due to GI bleed  S/p 2U prbc transfusion. Hb stable 10. 5 - 11 from 7.4     Probable GI bleed in setting of liver cirrhosis ?variceal bleed  G I on board.  Appreciate recommendation.     Cirrhosis with recurrent ascites status post paracentesis  Continue lactulose  Sinus tachycardia with history of atrial fibrillation  Continue cardizem.     Hyponatremia hypervolemic  Asymptomatic monitor.      Thrombocytopenia   monitor.    CK D stage III stable    Leukocytosis with no fever trending down.  Continue to monitor.    Plan of care discussed with the nursing staff.     The patient is  high risk due to the following diagnoses/reasons his age and liver cirrhosis      Bello Chadwick MD  09/22/18  11:28 AM

## 2018-09-23 NOTE — PROGRESS NOTES
[facility] HOSPITALIST PROGRESS NOTE     Patient Identification:  Name:  Rodriguez Botello Jr.  Age:  84 y.o.  Sex:  male  :  1934  MRN:  [id number]  Visit Number:  [id number]  Primary Care Provider:  Carl Hanks MD    Length of stay:  3      Subjective:   Patient is a gentleman who has cirrhosis with recurrent ascites and paracentesis, end-stage cirrhosis, anemia.  His hemoglobin has dropped from 12-9.2 to n 7.4 in a course of 13 days.  The patient had  elevated heart rate with low blood pressure.  There was concern that he might have a bleed somewhere. He was transfused prbc.  His hemoglobin has remained stable in the last 48 hours.  GI has seen patient and recommendation appreciated.  Family does not want anything invasive for now.  When seen this morning patient denies any pain and any shortness of breath. He is coping well with the news of his wife who  last night  ----------------------------------------------------------------------------------------------------------------------  Current Hospital Meds:    busPIRone 5 mg Oral TID   ceftriaxone 1 g Intravenous Q24H   digoxin 125 mcg Oral Daily   diltiaZEM  mg Oral Daily   famotidine 20 mg Oral QAM   fluticasone 2 spray Nasal Daily   lactulose 10 g Oral Daily   PARoxetine 10 mg Oral QAM   risperiDONE 3 mg Oral Nightly       sodium chloride 50 mL/hr Last Rate: 50 mL/hr (18 0000)     ----------------------------------------------------------------------------------------------------------------------  Vital Signs:  Temp:  [96.4 °F (35.8 °C)-98.4 °F (36.9 °C)] 97.5 °F (36.4 °C)  Heart Rate:  [] 93  Resp:  [16-20] 20  BP: (108-129)/(61-91) 115/73  1    18  0641 18  0554 18  0327   Weight: 64.8 kg (142 lb 12.8 oz) 65.2 kg (143 lb 12.8 oz) 67.6 kg (149 lb 1.6 oz)     Body mass index is 22.02 kg/m².    Intake/Output Summary (Last 24 hours) at 18 4621  Last data filed at 18 1300    Gross per 24 hour   Intake          1884.17 ml   Output              525 ml   Net          1359.17 ml     Diet Soft Texture; Chopped; Cardiac  ----------------------------------------------------------------------------------------------------------------------  Physical exam:  Constitutional:  Elderly male.  No respiratory distress.      HENT:  Head:  Normocephalic and atraumatic.  Mouth:  Dry mucous membranes.    Eyes:  Conjunctivae and EOM are normal.  Pupils are equal, round, and reactive to light.  No scleral icterus.    Neck:  Neck supple.  No JVD present.    Cardiovascular:  Normal rate,irregular rhythm and normal heart sounds with no murmur.  Pulmonary/Chest:  No respiratory distress, no wheezes, no crackles. Diminishedbreath sounds and air movement.Emphysematous chest.  Abdominal:  Soft.  Bowel sounds are normal. Distended and no tenderness.   Musculoskeletal:  No edema, no tenderness, and no deformity.  No red or swollen joints anywhere. Muscle atrophy   Neurological:  Alert  No cranial nerve deficit.  No tongue deviation.  No facial droop.  No slurred speech.   Skin:  Skin is warm and dry. No rash noted. No pallor.   Peripheral vascular:  Strong pulses in all 4 extremities with no clubbing, no cyanosis, no edema.  Psych.  Very friendly with normal mood and affect  ----------------------------------------------------------------------------------------------------------------------  ----------------------------------------------------------------------------------------------------------------------  Tele:    ----------------------------------------------------------------------------------------------------------------------        Results from last 7 days  Lab Units 09/23/18  0606 09/22/18  0532 09/21/18  1808  09/21/18  0401 09/20/18  0635 09/19/18  1902   LACTATE mmol/L  --   --   --   --   --  1.8  --    WBC 10*3/mm3 12.55* 12.47*  --   --  14.51* 13.89*  --    HEMOGLOBIN g/dL 11.1* 10.5* 11.0*  < >  10.5* 7.4*  --    HEMATOCRIT % 31.4* 29.5* 30.6*  < > 29.2* 21.2*  --    MCV fL 93.5 92.5  --   --  92.1 95.5  --    MCHC g/dL 35.4 35.6  --   --  36.0 34.9  --    PLATELETS 10*3/mm3 140* 128*  --   --  120* 118*  --    INR  1.33* 1.47*  --   --  1.73* 1.76* 1.69*   < > = values in this interval not displayed.        Results from last 7 days  Lab Units 09/23/18  0606 09/22/18  0532 09/21/18  0401   SODIUM mmol/L 129* 130* 127*   POTASSIUM mmol/L 4.1 3.9 3.5   CHLORIDE mmol/L 97 101 97   CO2 mmol/L 20.0* 23.0 23.0   BUN mg/dL 34* 37* 39*   CREATININE mg/dL 1.06 1.51* 1.60*   EGFR IF NONAFRICN AM mL/min/1.73 67 44 41*   CALCIUM mg/dL 7.6* 7.6* 7.6*   GLUCOSE mg/dL 163* 174* 162*   ALBUMIN g/dL 2.40* 2.20* 2.30*   BILIRUBIN mg/dL 1.8* 1.4* 2.1*   ALK PHOS U/L 287* 225* 226*   AST (SGOT) U/L 44 45 78*   ALT (SGPT) U/L 80* 91* 123*   Estimated Creatinine Clearance: 49.6 mL/min (by C-G formula based on SCr of 1.06 mg/dL).    No results found for: AMMONIA      Blood Culture   Date Value Ref Range Status   09/19/2018 No growth at 3 days  Preliminary   09/19/2018 No growth at 3 days  Preliminary                I have personally looked at the labs and they are summarized above.  ----------------------------------------------------------------------------------------------------------------------  Imaging Results (last 24 hours)     ** No results found for the last 24 hours. **        ----------------------------------------------------------------------------------------------------------------------  Assessment and Plan:  Anemia , unspecified likely due to GI bleed  S/p 2U prbc transfusion. Hb stable 10. 5 - 11 from 7.4     Probable GI bleed in setting of liver cirrhosis ?variceal bleed  G I on board.  Appreciate recommendation.     Cirrhosis with recurrent ascites status post paracentesis  Continue lactulose  Sinus tachycardia with history of atrial fibrillation  Continue cardizem.     Hyponatremia  hypervolemic  Asymptomatic monitor.      Thrombocytopenia trending up   monitor.     CKD stage III stable     Leukocytosis with no fever trending down.  Continue to monitor.     Plan of care discussed with the family at the bedside.     The patient is high risk due to the following diagnoses/reasons his age and liver cirrhosis      Bello Chadwick MD  09/23/18  2:06 PM

## 2018-09-23 NOTE — PROGRESS NOTES
Francis Spaulding DO,[facility]  Gastroenterology  Hepatology  Endoscopy  Board Certified in Internal Medicine and gastroenterology  [address], [address]  [place]. [address]  - [phone]   F [phone]     GASTROENTEROLOGY PROGRESS NOTE   FRANCIS SPAULDING DO.         SUBJECTIVE:   2018  Chief Complaint:     Subjective  : No evidence of any active ongoing gastrointestinal bleeding.  No abdominal pain.  No fevers or chills.  Does not want endoscopy.  Does not want repeat paracentesis.  Wife  last night.  Reviewed with family that is available in the room.          CURRENT MEDICATIONS/OBJECTIVE/VS/PE:     Current Medications:     Current Facility-Administered Medications   Medication Dose Route Frequency Provider Last Rate Last Dose   • acetaminophen (TYLENOL) tablet 500 mg  500 mg Oral Q4H PRN Alfred Dalal MD   500 mg at 18 1710   • busPIRone (BUSPAR) half tablet 5 mg  5 mg Oral TID Alfred Dalal MD   5 mg at 18 08   • cefTRIAXone (ROCEPHIN) 1 g/100 mL 0.9% NS (MBP)  1 g Intravenous Q24H Alfred Dalal MD 0 mL/hr at 18 2215 1 g at 18   • digoxin (LANOXIN) tablet 125 mcg  125 mcg Oral Daily Alfred Dalal MD   125 mcg at 18 1220   • diltiaZEM CD (CARDIZEM CD) 24 hr capsule 120 mg  120 mg Oral Daily Alfred Dalal MD   120 mg at 18 0806   • famotidine (PEPCID) tablet 20 mg  20 mg Oral QAM Alfred Dalal MD   20 mg at 18 0606   • fluticasone (FLONASE) 50 MCG/ACT nasal spray 2 spray  2 spray Nasal Daily Alfred Dalal MD   2 spray at 18 0806   • HYDROcodone-acetaminophen (NORCO) 5-325 MG per tablet 1 tablet  1 tablet Oral Q8H PRN Alfred Dalal MD   1 tablet at 18 0910   • lactulose (CHRONULAC) 10 GM/15ML solution 10 g  10 g Oral Daily Alfred Dalal MD   10 g at 18   • ondansetron (ZOFRAN) injection 4 mg  4 mg Intravenous Q6H PRN EchenduAlfrde MD       • PARoxetine  (PAXIL) tablet 10 mg  10 mg Oral QAM Alfred Dalal MD   10 mg at 09/23/18 0606   • risperiDONE (risperDAL) tablet 3 mg  3 mg Oral Nightly Alfred Dalal MD   3 mg at 09/22/18 2010   • sodium chloride 0.9 % flush 1-10 mL  1-10 mL Intravenous PRN Alfred Dalal MD       • sodium chloride 0.9 % flush 10 mL  10 mL Intravenous PRN Leonid Frederick MD   10 mL at 09/19/18 1142   • sodium chloride 0.9 % infusion  50 mL/hr Intravenous Continuous Alfred Dalal MD 50 mL/hr at 09/23/18 0000 50 mL/hr at 09/23/18 0000       Objective     Physical Exam:   Temp:  [96.4 °F (35.8 °C)-98.4 °F (36.9 °C)] 97.5 °F (36.4 °C)  Heart Rate:  [] 93  Resp:  [16-20] 20  BP: (108-129)/(61-91) 115/73     Physical Exam:  General Appearance:    Alert, cooperative, in no acute distress   Head:    Normocephalic, without obvious abnormality, atraumatic   Eyes:            Lids and lashes normal, conjunctivae and sclerae normal, no   icterus, no pallor, corneas clear, PERRLA   Ears:    Ears appear intact with no abnormalities noted   Throat:   No oral lesions, no thrush, oral mucosa moist   Neck:   No adenopathy, supple, trachea midline, no thyromegaly, no     carotid bruit, no JVD   Back:     No kyphosis present, no scoliosis present, no skin lesions,       erythema or scars, no tenderness to percussion or                   palpation,   range of motion normal   Lungs:     Clear to auscultation,respirations regular, even and                   unlabored    Heart:    Regular rhythm and normal rate, normal S1 and S2, no            murmur, no gallop, no rub, no click   Breast Exam:    Deferred   Abdomen:     Normal bowel sounds, no masses, no organomegaly, soft        non-tender, non-distended, no guarding, no rebound                 tenderness   Genitalia:    Deferred   Extremities:   Moves all extremities well, no edema, no cyanosis, no              redness   Pulses:   Pulses palpable and equal bilaterally   Skin:   No  bleeding, bruising or rash   Lymph nodes:   No palpable adenopathy   Neurologic:   Cranial nerves 2 - 12 grossly intact, sensation intact, DTR        present and equal bilaterally      Results Review:     Lab Results (last 24 hours)     Procedure Component Value Units Date/Time    AFP Tumor Marker [[id number]] Collected:  09/22/18 0532    Specimen:  Blood Updated:  09/23/18 0710     AFP Tumor Marker 0.9 ng/mL      Comment: Roche ECLIA methodology       Narrative:       Performed at:  [address] [place] [address]  [id number]  : Edwin Calvin PhD, Phone:  [phone]    Protime-INR [[id number]]  (Abnormal) Collected:  09/23/18 0606    Specimen:  Blood Updated:  09/23/18 0646     Protime 16.1 (H) Seconds      INR 1.33 (H)    Narrative:       Therapeutic range for most indications is 2.0-3.0 INR,  or 2.5-3.5 for mechanical heart valves.    Comprehensive Metabolic Panel [[id number]]  (Abnormal) Collected:  09/23/18 0606    Specimen:  Blood Updated:  09/23/18 0641     Glucose 163 (H) mg/dL      BUN 34 (H) mg/dL      Creatinine 1.06 mg/dL      Sodium 129 (L) mmol/L      Potassium 4.1 mmol/L      Chloride 97 mmol/L      CO2 20.0 (L) mmol/L      Calcium 7.6 (L) mg/dL      Total Protein 6.0 (L) g/dL      Albumin 2.40 (L) g/dL      ALT (SGPT) 80 (H) U/L      AST (SGOT) 44 U/L      Alkaline Phosphatase 287 (H) U/L      Total Bilirubin 1.8 (H) mg/dL      eGFR Non African Amer 67 mL/min/1.73      Globulin 3.6 (H) gm/dL      A/G Ratio 0.7 (L) g/dL      BUN/Creatinine Ratio 32.1 (H)     Anion Gap 12.0 mmol/L     Narrative:       The MDRD GFR formula is only valid for adults with stable renal function between ages 18 and 70.    CBC & Differential [[id number]] Collected:  09/23/18 0606    Specimen:  Blood Updated:  09/23/18 0631    Narrative:       The following orders were created for panel order CBC & Differential.  Procedure                               Abnormality         Status                      ---------                               -----------         ------                     CBC Auto Differential[[id number]]        Abnormal            Final result                 Please view results for these tests on the individual orders.    CBC Auto Differential [[id number]]  (Abnormal) Collected:  09/23/18 0606    Specimen:  Blood Updated:  09/23/18 0631     WBC 12.55 (H) 10*3/mm3      RBC 3.36 (L) 10*6/mm3      Hemoglobin 11.1 (L) g/dL      Hematocrit 31.4 (L) %      MCV 93.5 fL      MCH 33.0 pg      MCHC 35.4 g/dL      RDW 16.7 (H) %      RDW-SD 56.9 (H) fl      MPV 9.2 fL      Platelets 140 (L) 10*3/mm3      Neutrophil % 77.7 %      Lymphocyte % 6.4 (L) %      Monocyte % 13.9 (H) %      Eosinophil % 1.1 %      Basophil % 0.1 %      Immature Grans % 0.8 (H) %      Neutrophils, Absolute 9.75 (H) 10*3/mm3      Lymphocytes, Absolute 0.80 10*3/mm3      Monocytes, Absolute 1.75 (H) 10*3/mm3      Eosinophils, Absolute 0.14 10*3/mm3      Basophils, Absolute 0.01 10*3/mm3      Immature Grans, Absolute 0.10 (H) 10*3/mm3     Blood Culture - Blood, [[id number]]  (Normal) Collected:  09/19/18 1850    Specimen:  Blood from Arm, Left Updated:  09/22/18 2000     Blood Culture No growth at 3 days    Blood Culture - Blood, [[id number]]  (Normal) Collected:  09/19/18 1902    Specimen:  Blood from Arm, Right Updated:  09/22/18 2000     Blood Culture No growth at 3 days           I reviewed the patient's new clinical results.  I reviewed the patient's new imaging results and agree with the interpretation.     ASSESSMENT/PLAN:   ASSESSMENT:   1.  Gastrointestinal bleeding.  Secondary to portal hypertension as well as bloody ascites  2.  Cirrhosis complicated by ascites    PLAN:   1.  Reviewed with the patient and family.  At this time, they want to continue with conservative treatments.  2.  Dr. Klein will assume patient's care tomorrow      Daryl Mcgowan DO  09/23/18  12:44 PM

## 2018-09-23 NOTE — PLAN OF CARE
Problem: Patient Care Overview  Goal: Plan of Care Review  Outcome: Ongoing (interventions implemented as appropriate)   09/23/18 0134   Coping/Psychosocial   Plan of Care Reviewed With patient   Plan of Care Review   Progress no change     Goal: Individualization and Mutuality  Outcome: Ongoing (interventions implemented as appropriate)    Goal: Discharge Needs Assessment  Outcome: Ongoing (interventions implemented as appropriate)    Goal: Interprofessional Rounds/Family Conf  Outcome: Ongoing (interventions implemented as appropriate)      Problem: Fall Risk (Adult)  Goal: Absence of Fall  Outcome: Ongoing (interventions implemented as appropriate)      Problem: Skin Injury Risk (Adult)  Goal: Skin Health and Integrity  Outcome: Ongoing (interventions implemented as appropriate)      Problem: Anemia (Adult)  Goal: Symptom Improvement  Outcome: Ongoing (interventions implemented as appropriate)

## 2018-09-24 NOTE — DISCHARGE PLACEMENT REQUEST
"Goyo Botello  (84 y.o. Male)     Date of Birth Social Security Number Address Home Phone MRN    1934  [address] [address] [phone] [id number]    [employer] Marital Status          [employer]        Admission Date Admission Type Admitting Provider Attending Provider Department, Room/Bed    9/19/18 Emergency Alex Zendejas DO Williams, Kevin L, MD [address], 423/1    Discharge Date Discharge Disposition Discharge Destination         Skilled Nursing Facility (DC - External)              Attending Provider:  Cliff Morocho MD    Allergies:  Codeine, Penicillins, Iodine, Morphine And Related, Latex    Isolation:  None   Infection:  None   Code Status:  No CPR    Ht:  175.3 cm (69\")   Wt:  67.6 kg (149 lb 1.6 oz)    Admission Cmt:  None   Principal Problem:  None                Active Insurance as of 9/19/2018     Primary Coverage     Payor Plan Insurance Group Employer/Plan Group    [place]      Payor Plan Address Payor Plan Phone Number Effective From Effective To    [address] [phone] 7/6/2018     [facility] [id number]       Subscriber Name Subscriber Birth Date Member ID       GOYO BOTELLO JR. 1934 [id number]                 Emergency Contacts      (Rel.) Home Phone Work Phone Mobile Phone    Dora Gant (Daughter) [phone] -- [phone]        PT BEING DISCHARGED TODAY.    UNABLE TO GET THROUGH BY PHONE, BUSY.  WHAT ROOM WILL HE GO TO?  [phone]    Insurance Information                [place] Phone: [phone]    Subscriber: Goyo Botello Jr. Subscriber#: [id number]    Group#:  Precert#:           "

## 2018-09-24 NOTE — PROGRESS NOTES
SUBJECTIVE:   9/24/2018  Chief Complaint:     Subjective      Patient is 84 y.o. male who has ascites secondary to cirrhosis of liver.  Patient with anemia and possible secondary to GI blood loss.  Patient has been reluctant to undergo endoscopy.  Patient recently lost his wife.  Patient wishes to go home at this time.  Patient is having no difficulty with breathing and ascites is under control at this time.     CURRENT MEDICATIONS/OBJECTIVE/VS/PE:     Current Medications:     Current Facility-Administered Medications   Medication Dose Route Frequency Provider Last Rate Last Dose   • acetaminophen (TYLENOL) tablet 500 mg  500 mg Oral Q4H PRN Alfred Dalal MD   500 mg at 09/20/18 1710   • busPIRone (BUSPAR) tablet 5 mg  5 mg Oral TID Alfred Dalal MD   5 mg at 09/24/18 0810   • cefTRIAXone (ROCEPHIN) 1 g/100 mL 0.9% NS (MBP)  1 g Intravenous Q24H Alfred Dalal MD 0 mL/hr at 09/19/18 2215 1 g at 09/23/18 1956   • digoxin (LANOXIN) tablet 125 mcg  125 mcg Oral Daily Alfred Dalal MD   125 mcg at 09/23/18 1438   • diltiaZEM CD (CARDIZEM CD) 24 hr capsule 120 mg  120 mg Oral Daily Alfred Dalal MD   120 mg at 09/24/18 0810   • famotidine (PEPCID) tablet 20 mg  20 mg Oral QAM Alfred Dalal MD   20 mg at 09/24/18 0617   • fluticasone (FLONASE) 50 MCG/ACT nasal spray 2 spray  2 spray Nasal Daily Alfred Dalal MD   2 spray at 09/24/18 0813   • HYDROcodone-acetaminophen (NORCO) 5-325 MG per tablet 1 tablet  1 tablet Oral Q8H PRN Alfred Dalal MD   1 tablet at 09/24/18 0621   • lactulose (CHRONULAC) 10 GM/15ML solution 10 g  10 g Oral Daily Alfred Dalal MD   10 g at 09/23/18 2004   • ondansetron (ZOFRAN) injection 4 mg  4 mg Intravenous Q6H PRN Alfred Dalal MD       • PARoxetine (PAXIL) tablet 10 mg  10 mg Oral QAM Alfred Dalal MD   10 mg at 09/24/18 0617   • risperiDONE (risperDAL) tablet 3 mg  3 mg Oral Nightly Alfred Dalal MD   3 mg at  09/23/18 2004   • sodium chloride 0.9 % flush 1-10 mL  1-10 mL Intravenous PRN Alfred Dalal MD   10 mL at 09/23/18 1956   • sodium chloride 0.9 % flush 10 mL  10 mL Intravenous PRN Leonid Frederick MD   10 mL at 09/19/18 1142   • sodium chloride 0.9 % infusion  50 mL/hr Intravenous Continuous Alfred Dalal MD 50 mL/hr at 09/23/18 1956 50 mL/hr at 09/23/18 1956       Objective     Physical Exam:   Temp:  [96.3 °F (35.7 °C)-98.2 °F (36.8 °C)] 96.7 °F (35.9 °C)  Heart Rate:  [89-98] 91  Resp:  [18] 18  BP: (110-130)/(64-77) 110/66     Physical Exam:  General Appearance:    Alert, cooperative, in no acute distress   Head:    Normocephalic, without obvious abnormality, atraumatic   Eyes:            Lids and lashes normal, conjunctivae and sclerae normal, no   icterus, no pallor, corneas clear, PERRLA   Ears:    Ears appear intact with no abnormalities noted   Throat:   No oral lesions, no thrush, oral mucosa moist   Neck:   No adenopathy, supple, trachea midline, no thyromegaly, no     carotid bruit, no JVD   Back:     No kyphosis present, no scoliosis present, no skin lesions,       erythema or scars, no tenderness to percussion or                   palpation,   range of motion normal   Lungs:     Clear to auscultation,respirations regular, even and                   unlabored    Heart:    Regular rhythm and normal rate, normal S1 and S2, no            murmur, no gallop, no rub, no click   Breast Exam:    Deferred   Abdomen:     Normal bowel sounds, no masses, no organomegaly, soft        non-tender, non-distended, no guarding, no rebound                 tenderness   Genitalia:    Deferred   Extremities:   Moves all extremities well, no edema, no cyanosis, no              redness   Pulses:   Pulses palpable and equal bilaterally   Skin:   No bleeding, bruising or rash   Lymph nodes:   No palpable adenopathy   Neurologic:   Cranial nerves 2 - 12 grossly intact, sensation intact, DTR        present and equal  bilaterally      Results Review:     Lab Results (last 24 hours)     Procedure Component Value Units Date/Time    Extra Tubes [[id number]] Collected:  09/24/18 0702    Specimen:  Blood from Blood, Venous Line Updated:  09/24/18 0815    Narrative:       The following orders were created for panel order Extra Tubes.  Procedure                               Abnormality         Status                     ---------                               -----------         ------                     Gold Top - SST[[id number]]                                   Final result                 Please view results for these tests on the individual orders.    Gold Top - SST [[id number]] Collected:  09/24/18 0702    Specimen:  Blood Updated:  09/24/18 0815     Extra Tube Hold for add-ons.     Comment: Auto resulted.       Comprehensive Metabolic Panel [[id number]]  (Abnormal) Collected:  09/24/18 0701    Specimen:  Blood Updated:  09/24/18 0813     Glucose 151 (H) mg/dL      BUN 31 (H) mg/dL      Creatinine 1.12 mg/dL      Sodium 130 (L) mmol/L      Potassium 4.4 mmol/L      Chloride 99 mmol/L      CO2 21.0 (L) mmol/L      Calcium 7.8 (L) mg/dL      Total Protein 6.0 (L) g/dL      Albumin 2.40 (L) g/dL      ALT (SGPT) 65 U/L      AST (SGOT) 42 U/L      Alkaline Phosphatase 290 (H) U/L      Total Bilirubin 2.2 (H) mg/dL      eGFR Non African Amer 62 mL/min/1.73      Globulin 3.6 (H) gm/dL      A/G Ratio 0.7 (L) g/dL      BUN/Creatinine Ratio 27.7 (H)     Anion Gap 10.0 mmol/L     Narrative:       The MDRD GFR formula is only valid for adults with stable renal function between ages 18 and 70.    Protime-INR [[id number]]  (Abnormal) Collected:  09/24/18 0701    Specimen:  Blood Updated:  09/24/18 0809     Protime 16.5 (H) Seconds      INR 1.37 (H)    Narrative:       Therapeutic range for most indications is 2.0-3.0 INR,  or 2.5-3.5 for mechanical heart valves.    CBC & Differential [[id number]] Collected:  09/24/18 0701    Specimen:  Blood  Updated:  09/24/18 0759    Narrative:       The following orders were created for panel order CBC & Differential.  Procedure                               Abnormality         Status                     ---------                               -----------         ------                     CBC Auto Differential[[id number]]        Abnormal            Final result                 Please view results for these tests on the individual orders.    CBC Auto Differential [[id number]]  (Abnormal) Collected:  09/24/18 0701    Specimen:  Blood Updated:  09/24/18 0759     WBC 10.36 (H) 10*3/mm3      RBC 3.30 (L) 10*6/mm3      Hemoglobin 10.9 (L) g/dL      Hematocrit 31.0 (L) %      MCV 93.9 fL      MCH 33.0 pg      MCHC 35.2 g/dL      RDW 16.7 (H) %      RDW-SD 57.0 (H) fl      MPV 9.4 fL      Platelets 140 (L) 10*3/mm3      Neutrophil % 75.2 %      Lymphocyte % 6.7 (L) %      Monocyte % 15.3 (H) %      Eosinophil % 1.8 %      Basophil % 0.2 %      Immature Grans % 0.8 (H) %      Neutrophils, Absolute 7.79 10*3/mm3      Lymphocytes, Absolute 0.69 10*3/mm3      Monocytes, Absolute 1.59 (H) 10*3/mm3      Eosinophils, Absolute 0.19 10*3/mm3      Basophils, Absolute 0.02 10*3/mm3      Immature Grans, Absolute 0.08 (H) 10*3/mm3     Blood Culture - Blood, [[id number]]  (Normal) Collected:  09/19/18 1902    Specimen:  Blood from Arm, Right Updated:  09/23/18 2000     Blood Culture No growth at 4 days    Blood Culture - Blood, [[id number]]  (Normal) Collected:  09/19/18 1850    Specimen:  Blood from Arm, Left Updated:  09/23/18 2000     Blood Culture No growth at 4 days           I reviewed the patient's new clinical results.  I reviewed the patient's new imaging results and agree with the interpretation.     ASSESSMENT/PLAN:   ASSESSMENT: Patient showing no signs of GI bleeding at this time.  Patient has had no hematemesis or hematochezia.  Patient's hemoglobin is remaining stable.  Patient's ascites is stable at this time patient is having  no difficulty breathing and    PLAN: #1 continue to monitor patient's hemoglobin if hemoglobin less than 7 please transfuse 2 units packed RBCs.  #2 continue to watch patient's abdomen if abdomen becomes tense and he is having difficulty breathing consider repeat paracentesis.  #3 discussed with family who wishes the patient to go home if possible.  Patient's wife is side yesterday patient would like to go home this time I believe from a GI standpoint he is stable to go home.  The risks, benefits, and alternatives of this procedure have been discussed with the patient or the responsible party- the patient understands and agrees to proceed.         Carl Klein MD  09/24/18  12:04 PM           This document has been electronically signed by Carl Klein MD on September 24, 2018 12:04 PM

## 2018-09-24 NOTE — DISCHARGE SUMMARY
[facility] HOSPITALIST MEDICINE DISCHARGE SUMMARY    Patient Identification:  Name:  Rodriguez Botello Jr.  Age:  84 y.o.  Sex:  male  :  1934  MRN:  [id number]  Visit Number:  [id number]    Date of Admission: 2018  Date of Discharge:  2018     PCP: Carl Hanks MD    DISCHARGE DIAGNOSIS  Anemia , unspecified likely due to GI bleed  Probable GI bleed in setting of liver cirrhosis ?variceal bleed  Cirrhosis   Recurrent ascites status post paracentesis  Sinus tachycardia with history of atrial fibrillation  Hypervolemic hyponatremia   Thrombocytopenia   CKD stage III   Leukocytosis     CONSULTS   GI Dr Daryl Mcgowan    PROCEDURES PERFORMED  Paracentesis    HOSPITAL COURSE  Patient is a 84 y.o. male presented to [facility] complaining of abdominal distention and shortness of breath.  He has history of liver cirrhosis with recurrent ascites.  He comes in for intermittent paracentesis.  This was done and 7.2 L of bloody fluid was removed.  His hemoglobin has dropped from 12 - 9.2  - 7.4 in a course of 13 days.  The patient had  elevated heart rate with low blood pressure.  There was concern that he might have a bleed somewhere. He was transfused 2u prbc.  GI consult was placed.  Family did not want any intervention.  His hemoglobin has remained stable.  He'll be discharged back to the nursing home.        VITAL SIGNS:  Vital Signs (last 24 hours)        0700  -   0659  0700  -   1154   Most Recent    Temp (°F) 96.3 -  97.5    96.7 -  98.2     96.7 (35.9)    Heart Rate 90 -  110    89 -  94     91    Resp       18     18    /73 -  130/77    110/66 -  120/70     110/66    SpO2 (%) 97 -  99    96 -  97     96          1    18  0641 18  0554 18  0327   Weight: 64.8 kg (142 lb 12.8 oz) 65.2 kg (143 lb 12.8 oz) 67.6 kg (149 lb 1.6 oz)     Body mass index is 22.02 kg/m².    PHYSICAL EXAM:  Constitutional:  Elderly male.  No  respiratory distress.      HENT:  Head:  Normocephalic and atraumatic.  Mouth:  Dry mucous membranes.    Eyes:  Conjunctivae and EOM are normal.  Pupils are equal, round, and reactive to light.  No scleral icterus.    Neck:  Neck supple.  No JVD present.    Cardiovascular:  Normal rate,irregular rhythm and normal heart sounds with no murmur.  Pulmonary/Chest:  No respiratory distress, no wheezes, no crackles. Diminishedbreath sounds and air movement.Emphysematous chest.  Abdominal:  Soft.  Bowel sounds are normal. Distended and no tenderness.   Musculoskeletal:  No edema, no tenderness, and no deformity.  No red or swollen joints anywhere. Muscle atrophy   Neurological:  Alert  No cranial nerve deficit.  No tongue deviation.  No facial droop.  No slurred speech.   Skin:  Skin is warm and dry. No rash noted. No pallor.   Peripheral vascular:  Strong pulses in all 4 extremities with no clubbing, no cyanosis, no edema  Psych: Appropriate mood and affect    Lab Results (last 24 hours)     Procedure Component Value Units Date/Time    Extra Tubes [[id number]] Collected:  09/24/18 0702    Specimen:  Blood from Blood, Venous Line Updated:  09/24/18 0815    Narrative:       The following orders were created for panel order Extra Tubes.  Procedure                               Abnormality         Status                     ---------                               -----------         ------                     Gold Top - [facility][[id number]]                                   Final result                 Please view results for these tests on the individual orders.    Gold Top - SST [[id number]] Collected:  09/24/18 0702    Specimen:  Blood Updated:  09/24/18 0815     Extra Tube Hold for add-ons.     Comment: Auto resulted.       Comprehensive Metabolic Panel [[id number]]  (Abnormal) Collected:  09/24/18 0701    Specimen:  Blood Updated:  09/24/18 0813     Glucose 151 (H) mg/dL      BUN 31 (H) mg/dL      Creatinine 1.12 mg/dL      Sodium  130 (L) mmol/L      Potassium 4.4 mmol/L      Chloride 99 mmol/L      CO2 21.0 (L) mmol/L      Calcium 7.8 (L) mg/dL      Total Protein 6.0 (L) g/dL      Albumin 2.40 (L) g/dL      ALT (SGPT) 65 U/L      AST (SGOT) 42 U/L      Alkaline Phosphatase 290 (H) U/L      Total Bilirubin 2.2 (H) mg/dL      eGFR Non African Amer 62 mL/min/1.73      Globulin 3.6 (H) gm/dL      A/G Ratio 0.7 (L) g/dL      BUN/Creatinine Ratio 27.7 (H)     Anion Gap 10.0 mmol/L     Narrative:       The MDRD GFR formula is only valid for adults with stable renal function between ages 18 and 70.    Protime-INR [[id number]]  (Abnormal) Collected:  09/24/18 0701    Specimen:  Blood Updated:  09/24/18 0809     Protime 16.5 (H) Seconds      INR 1.37 (H)    Narrative:       Therapeutic range for most indications is 2.0-3.0 INR,  or 2.5-3.5 for mechanical heart valves.    CBC & Differential [[id number]] Collected:  09/24/18 0701    Specimen:  Blood Updated:  09/24/18 0759    Narrative:       The following orders were created for panel order CBC & Differential.  Procedure                               Abnormality         Status                     ---------                               -----------         ------                     CBC Auto Differential[[id number]]        Abnormal            Final result                 Please view results for these tests on the individual orders.    CBC Auto Differential [[id number]]  (Abnormal) Collected:  09/24/18 0701    Specimen:  Blood Updated:  09/24/18 0759     WBC 10.36 (H) 10*3/mm3      RBC 3.30 (L) 10*6/mm3      Hemoglobin 10.9 (L) g/dL      Hematocrit 31.0 (L) %      MCV 93.9 fL      MCH 33.0 pg      MCHC 35.2 g/dL      RDW 16.7 (H) %      RDW-SD 57.0 (H) fl      MPV 9.4 fL      Platelets 140 (L) 10*3/mm3      Neutrophil % 75.2 %      Lymphocyte % 6.7 (L) %      Monocyte % 15.3 (H) %      Eosinophil % 1.8 %      Basophil % 0.2 %      Immature Grans % 0.8 (H) %      Neutrophils, Absolute 7.79 10*3/mm3       Lymphocytes, Absolute 0.69 10*3/mm3      Monocytes, Absolute 1.59 (H) 10*3/mm3      Eosinophils, Absolute 0.19 10*3/mm3      Basophils, Absolute 0.02 10*3/mm3      Immature Grans, Absolute 0.08 (H) 10*3/mm3     Blood Culture - Blood, [[id number]]  (Normal) Collected:  09/19/18 1902    Specimen:  Blood from Arm, Right Updated:  09/23/18 2000     Blood Culture No growth at 4 days    Blood Culture - Blood, [[id number]]  (Normal) Collected:  09/19/18 1850    Specimen:  Blood from Arm, Left Updated:  09/23/18 2000     Blood Culture No growth at 4 days        Imaging Results (last 7 days)     Procedure Component Value Units Date/Time    US Paracentesis [[id number]] Collected:  09/19/18 1500    Specimen:  Body Fluid Updated:  09/19/18 1607    Narrative:         PROCEDURE: Ultrasound-guided paracentesis    COMPARISON: None    HISTORY: Ascites    TECHNIQUE:  The risks, benefits and alternatives were explained to the  patient who had ample opportunity to ask questions. The patient  agreed to proceed and informed consent was obtained.    An adequate fluid pocket was identified in the right lower  quadrant. The site was marked then prepped and draped in sterile  fashion. Approximately 5 mL of 2% lidocaine solution was used for  local superficial and deep anesthesia. A 5 [place] Yueh needle was  inserted into the peritoneal cavity under sonographic guidance.    FINDINGS:   Approximately 7200 mL of bloody fluid was removed. There were no  immediate post procedure complications.      Impression:       CONCLUSION:    Successful ultrasound-guided paracentesis, as described above.    Electronically signed by:  Bowen Gusman MD  9/19/2018 4:06 PM CDT  Workstation: [id number]    XR Chest 1 View [[id number]] Collected:  09/19/18 1100     Updated:  09/19/18 1150    Narrative:       [facility] [place]    Patient Name: GOYO ALICIAKAL GONZALEZ    ORDERING: MIKA AGUIAR     ATTENDING: MIKA AGUIAR     REFERRING: MIKA CAPPS  COSME    -----------------------    PROCEDURE: Portable chest x-ray    TECHNIQUE: Single AP view of the chest    COMPARISON: 7/13/2018     HISTORY: sob    FINDINGS:     Life-support devices: None    Lungs/pleura: Clear    Heart, hilar and mediastinal structures:  Normal accounting for  projection and technique      Impression:       CONCLUSION:  No acute pulmonary disease.    Electronically signed by:  Bowen Gusman MD  9/19/2018 11:42 AM  CDT Workstation: [id number]        DISCHARGE DIET: Low sodium diet    DISCHARGE ACTIVITY: As tolerated.      DISCHARGE DISPOSITION   Stable    DISCHARGE MEDICATIONS:     Discharge Medications      Continue These Medications      Instructions Start Date   acetaminophen 500 MG tablet  Commonly known as:  TYLENOL   500 mg, Oral, Every 4 Hours PRN      busPIRone 5 MG tablet  Commonly known as:  BUSPAR  Notes to patient:  Next dose 9/24/18 2 pm   5 mg, Oral, 3 Times Daily      digoxin 125 MCG tablet  Commonly known as:  LANOXIN  Notes to patient:  Next dose 9/25/18 9 am   125 mcg, Oral, Daily Digoxin, In A.M.       diltiaZEM  MG 24 hr capsule  Commonly known as:  CARDIZEM CD  Notes to patient:  Next dose 9/25/18 9 am   120 mg, Oral, Daily, Every Morning Before Meal For Heart Or To Lower Blood Pressure       doxycycline 50 MG capsule  Commonly known as:  MONODOX  Notes to patient:  Next dose 9/24/18 9 pm   100 mg, Oral, 2 Times Daily      famotidine 20 MG tablet  Commonly known as:  PEPCID  Notes to patient:  Next dose 9/25/18 9 am   20 mg, Oral, Every Morning      fluticasone 50 MCG/ACT nasal spray  Commonly known as:  FLONASE  Notes to patient:  Next dose 9/25/18 9 am   2 sprays, Nasal, Daily      furosemide 40 MG tablet  Commonly known as:  LASIX  Notes to patient:  Next dose 9/25/18 9 am   40 mg, Oral, Every Morning      HYDROcodone-acetaminophen 5-325 MG per tablet  Commonly known as:  NORCO   1 tablet, Oral, Every 8 Hours PRN      LACTULOSE PO  Notes to patient:  Next dose  9/24/18 9 pm   15 cc by mouth at night       PARoxetine 10 MG tablet  Commonly known as:  PAXIL  Notes to patient:  Next dose 9/25/18 9 am   10 mg, Oral, Every Morning      risperiDONE 3 MG tablet  Commonly known as:  risperDAL  Notes to patient:  Next dose 9/24/18 9 pm   3 mg, Oral, Nightly      spironolactone 50 MG tablet  Commonly known as:  ALDACTONE  Notes to patient:  Next dose 9/25/18 9 am   50 mg, Oral, Daily               Your Scheduled Appointments    Sep 25, 2018  2:30 PM CDT  Office Visit with Carl Klein MD  [facility] GASTROENTEROLOG[facility] (--) [address] [place] [address]  [facility] [address] [place] [id number] [phone]   Arrive 15 minutes prior to appointment.  If you are in need of a language or hearing  please call the Department.   Oct 04, 2018 10:45 AM CDT  Office Visit with Carl Klein MD  [facility] (--) [address] [place] [address]  [facility] [place] [address] [place] [id number] [phone]   Arrive 15 minutes prior to appointment.  If you are in need of a language or hearing  please call the Department.          Follow-up Information     Carl Hanks MD Follow up.    Specialty:  Family Medicine  Contact information:  [address] [id number] [phone]                   TEST  RESULTS PENDING AT DISCHARGE   Order Current Status    Blood Culture - Blood, Preliminary result    Blood Culture - Blood, Preliminary result           Bello Chadwick MD  09/24/18  11:43 AM    Please note that this discharge summary required more than 30 minutes to complete.    Please send a copy of this dictation to the following providers:  Carl Hanks MD

## 2018-09-24 NOTE — PLAN OF CARE
Problem: Patient Care Overview  Goal: Plan of Care Review  Outcome: Ongoing (interventions implemented as appropriate)   09/24/18 0038   OTHER   Outcome Summary Pt not complaining of pain, seems to be resting well. Will cont to monitor.      Goal: Individualization and Mutuality  Outcome: Ongoing (interventions implemented as appropriate)    Goal: Discharge Needs Assessment  Outcome: Ongoing (interventions implemented as appropriate)    Goal: Interprofessional Rounds/Family Conf  Outcome: Ongoing (interventions implemented as appropriate)      Problem: Fall Risk (Adult)  Goal: Absence of Fall  Outcome: Ongoing (interventions implemented as appropriate)      Problem: Skin Injury Risk (Adult)  Goal: Skin Health and Integrity  Outcome: Ongoing (interventions implemented as appropriate)      Problem: Anemia (Adult)  Goal: Symptom Improvement  Outcome: Ongoing (interventions implemented as appropriate)

## 2018-09-26 PROBLEM — R18.8 OTHER ASCITES: Status: ACTIVE | Noted: 2018-01-01

## 2018-09-26 PROBLEM — K74.60 CIRRHOSIS (HCC): Chronic | Status: ACTIVE | Noted: 2017-05-09

## 2018-09-26 PROBLEM — R18.8 ASCITES: Chronic | Status: ACTIVE | Noted: 2018-01-01

## 2018-09-26 PROBLEM — R64 CACHEXIA (HCC): Chronic | Status: ACTIVE | Noted: 2018-01-01

## 2018-09-26 PROBLEM — E11.9 DIABETES MELLITUS (HCC): Chronic | Status: ACTIVE | Noted: 2018-01-01

## 2018-09-26 PROBLEM — D64.9 ANEMIA: Chronic | Status: ACTIVE | Noted: 2018-01-01

## 2018-09-26 PROBLEM — E87.1 HYPONATREMIA: Chronic | Status: ACTIVE | Noted: 2018-01-01

## 2018-09-26 NOTE — TELEPHONE ENCOUNTER
2018, 1033 - [facility] telephoned per this staff member [phone].  Spoke with Thais Bedolla, Director Of Nursing.  Ms. Bedolla made aware of verbal notification received per Dr. Carl Gill M.D. 2018 - Dr. Carl Gill M.D spoke with patient and patient's family prior to patient's discharge from [facility] 2018 regarding Paracentesis to be completed only when patient exhibits shortness of breath.  Ms Bedolla stated she does not note clinical documentation per [facility] regarding telephone contact with office of Dr. Carl Gill M.D. requesting need of patient Paracentesis.  Ms. Bedolla stated she had spoken with Primary Care Physician, Dr. Carl Hanks M.D. 2018 in A.M.  Per Dr. Hanks, [facility] to speak with patient's Power Of  following  services for patient's wife regarding continuation of progressive treatment or initiation of palliative care.  Ms. Bedolla requested to maintain patient current clinical appointment with Dr. Carl Gill M.D.  at 10:00 a.m.  Ms. Bedolla stated she will contact office of Dr. Carl Gill M.D. should clinical appointment no longer be required.

## 2018-09-26 NOTE — TELEPHONE ENCOUNTER
----- Message from Beth Good MA sent at 2018  9:17 AM CDT -----  Needs paracentesis badly, cant make appointment today due to wifes , has appt for Thursday but I am afraid we wont be able to get the para before next week. Can dr order it to be done this week?

## 2018-09-27 PROBLEM — R18.8 OTHER ASCITES: Status: ACTIVE | Noted: 2018-01-01

## 2018-10-04 NOTE — PROGRESS NOTES
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  Patient has worsening ascites and is complaining of pain and difficulty breathing.  Patient has had multiple paracentesis with fluid removal.  Patient's family does not wish any further studies other than paracentesis as needed.    Plan; we'll have patient go for paracentesis.    Past Medical History:   Past Medical History:   Diagnosis Date   • Anemia    • Ascites    • Cirrhosis (CMS/HCC) 5/9/2017   • Diabetes mellitus (CMS/HCC) 9/26/2018   • GERD (gastroesophageal reflux disease)    • Hyperlipidemia    • Hypertension    • Liver failure (CMS/HCC)        Family History:  Family History   Problem Relation Age of Onset   • Hypertension Father        Social History:   reports that he has never smoked. His smokeless tobacco use includes Chew. He reports that he does not drink alcohol or use drugs.    Medications:   Prior to Admission medications    Medication Sig Start Date End Date Taking? Authorizing Provider   albuterol (PROVENTIL HFA;VENTOLIN HFA) 108 (90 Base) MCG/ACT inhaler Inhale 2 puffs 4 (Four) Times a Day. 9/28/18  Yes Yadiel Caba MD   busPIRone (BUSPAR) 5 MG tablet Take 5 mg by mouth 3 (Three) Times a Day.   Yes Dolores Cohn MD   digoxin (LANOXIN) 125 MCG tablet Take 125 mcg by mouth Daily. In A.M.   Yes Dolores Cohn MD   diltiaZEM CD (CARDIZEM CD) 120 MG 24 hr capsule Take 120 mg by mouth Daily. Every Morning Before Meal For Heart Or To Lower Blood Pressure   Yes Dolores Cohn MD   famotidine (PEPCID) 20 MG tablet Take 20 mg by mouth Every Morning.   Yes Dolores Cohn MD   fluticasone (FLONASE) 50 MCG/ACT nasal spray 2 sprays into each nostril Daily.   Yes Dolores Cohn MD   furosemide (LASIX) 40 MG tablet Take 40 mg by mouth Every Morning.   Yes Suki  "MD Dolores   HYDROcodone-acetaminophen (NORCO) 5-325 MG per tablet Take 1 tablet by mouth Every 8 (Eight) Hours As Needed for Moderate Pain  (VAS 5-10). 9/28/18  Yes Yadiel Caba MD   insulin aspart (novoLOG) 100 UNIT/ML injection Inject 0-9 Units under the skin into the appropriate area as directed 4 (Four) Times a Day Before Meals & at Bedtime. 9/28/18  Yes Yadiel Caba MD   lactulose (CHRONULAC) 10 GM/15ML solution Take 15 mL by mouth 2 (Two) Times a Day. 15 cc by mouth at night 9/28/18  Yes Yadiel Caba MD   PARoxetine (PAXIL) 10 MG tablet Take 10 mg by mouth Every Morning.   Yes ProviderDolores MD   risperiDONE (risperDAL) 3 MG tablet Take 3 mg by mouth Every Night. 6/18/15  Yes ProviderDolores MD   spironolactone (ALDACTONE) 50 MG tablet Take 1 tablet by mouth Daily. 9/28/18  Yes Yadiel Caba MD       Allergies:  Codeine; Penicillins; Iodine; Morphine and related; and Latex    ROS:    Review of Systems   Constitutional: Negative for activity change, appetite change, chills, diaphoresis, fatigue, fever and unexpected weight change.   HENT: Negative for sore throat and trouble swallowing.    Respiratory: Negative for shortness of breath.    Gastrointestinal: Positive for abdominal distention and abdominal pain. Negative for anal bleeding, blood in stool, constipation, diarrhea, nausea, rectal pain and vomiting.   Musculoskeletal: Negative for arthralgias.   Skin: Negative for pallor.   Neurological: Negative for light-headedness.     Objective     Blood pressure 100/64, pulse 87, height 177.8 cm (70\"), weight 67.5 kg (148 lb 12.8 oz).    Physical Exam   Constitutional: He is oriented to person, place, and time. He appears well-developed and well-nourished. No distress.   HENT:   Head: Normocephalic and atraumatic.   Cardiovascular: Normal rate, regular rhythm, normal heart sounds and intact distal pulses.  Exam reveals no gallop and no friction rub.    No murmur " heard.  Pulmonary/Chest: Breath sounds normal. No respiratory distress. He has no wheezes. He has no rales. He exhibits no tenderness.   Abdominal: Soft. Bowel sounds are normal. He exhibits no distension and no mass. There is no tenderness. There is no rebound and no guarding. No hernia.   Large amount of ascites.  Nontender   Musculoskeletal: Normal range of motion. He exhibits no edema.   Neurological: He is alert and oriented to person, place, and time.   Skin: Skin is warm and dry. No rash noted. He is not diaphoretic. No erythema. No pallor.   Psychiatric: He has a normal mood and affect. His behavior is normal. Judgment and thought content normal.        Assessment/Plan   Rodriguez was seen today for ascites.    Diagnoses and all orders for this visit:    Epigastric pain  -     US Abdomen Complete; Future        * Surgery not found *     Diagnosis Plan   1. Epigastric pain  US Abdomen Complete       Anticipated Surgical Procedure:  Orders Placed This Encounter   Procedures   • US Abdomen Complete     paracentisis     Standing Status:   Future     Standing Expiration Date:   10/4/2019     Order Specific Question:   Reason for Exam:     Answer:   paracentisis       The risks, benefits, and alternatives of this procedure have been discussed with the patient or the responsible party- the patient understands and agrees to proceed.

## 2018-10-04 NOTE — PATIENT INSTRUCTIONS

## 2018-10-06 NOTE — ED NOTES
Pt presents to the ED with severe abdominal swelling. Pt was scheduled for paracenesthesias on the 12th but could not wait till then.      Teri Olsen RN  10/06/18 1382

## 2018-10-06 NOTE — PRE-PROCEDURE NOTE
Recurrent ascites. Referred for ultrasound paracentesis.Procedure explained. Consent obtained. Risks and benefits advised.

## 2018-10-06 NOTE — ED PROVIDER NOTES
Subjective   84-year-old male with a history of cirrhosis and ascites comes to the emergency department complaining of abdominal pain and distention is been present for several days.  He last had a paracentesis about a week ago.  He was sent in today because the nurses at the place where he stays feel that he is becoming more distended and requiring urgent/emergent paracentesis.  Patient's complaining of diffuse abdominal pain.  He's had no fevers or chills.  He is short of breath due to the increasing circumference of his abdomen.  He makes his symptoms better or worse        Abdominal Pain   Pain location:  Generalized  Pain quality: aching    Pain radiates to:  Does not radiate  Pain severity:  Severe  Onset quality:  Gradual  Timing:  Constant  Progression:  Worsening  Chronicity:  Recurrent  Relieved by:  Nothing  Worsened by:  Nothing  Associated symptoms: no chest pain, no chills, no cough, no dysuria, no fatigue, no fever, no nausea, no shortness of breath, no sore throat and no vomiting        Review of Systems   Constitutional: Negative for chills, fatigue and fever.   HENT: Negative for congestion and sore throat.    Eyes: Negative for visual disturbance.   Respiratory: Negative for cough and shortness of breath.    Cardiovascular: Negative for chest pain and leg swelling.   Gastrointestinal: Positive for abdominal pain. Negative for nausea and vomiting.   Genitourinary: Negative for dysuria.   Musculoskeletal: Negative for back pain.   Skin: Negative for rash.   Neurological: Negative for dizziness and weakness.   Psychiatric/Behavioral: Negative for behavioral problems.   All other systems reviewed and are negative.      Past Medical History:   Diagnosis Date   • Anemia    • Ascites    • Cirrhosis (CMS/HCC) 5/9/2017   • Diabetes mellitus (CMS/HCC) 9/26/2018   • GERD (gastroesophageal reflux disease)    • Hyperlipidemia    • Hypertension    • Liver failure (CMS/HCC)        Allergies   Allergen Reactions    • Codeine Shortness Of Breath   • Penicillins Shortness Of Breath     AND PENICILLIN G   • Iodine Rash   • Morphine And Related Mental Status Change   • Latex Rash       Past Surgical History:   Procedure Laterality Date   • BACK SURGERY         Family History   Problem Relation Age of Onset   • Hypertension Father        Social History     Social History   • Marital status:      Social History Main Topics   • Smoking status: Never Smoker   • Smokeless tobacco: Current User     Types: Chew   • Alcohol use No   • Drug use: No   • Sexual activity: Not Currently     Other Topics Concern   • Not on file           Objective   Physical Exam   Constitutional: He is oriented to person, place, and time. He appears well-developed and well-nourished.   HENT:   Head: Normocephalic and atraumatic.   Eyes: Pupils are equal, round, and reactive to light. EOM are normal.   Neck: Normal range of motion. Neck supple.   No midline tenderness   Cardiovascular: Normal rate, regular rhythm, normal heart sounds and intact distal pulses.  Exam reveals no gallop and no friction rub.    No murmur heard.  Pulmonary/Chest: Breath sounds normal. No respiratory distress. He has no wheezes. He has no rales.   No rhonchi   Abdominal: Soft. Bowel sounds are normal. He exhibits distension. There is tenderness.   Musculoskeletal: Normal range of motion. He exhibits no tenderness or deformity.   Neurological: He is alert and oriented to person, place, and time. No cranial nerve deficit. He exhibits normal muscle tone. Coordination normal.   Skin: Skin is warm and dry. No rash noted.   Psychiatric: He has a normal mood and affect. His behavior is normal.       Procedures           ED Course                  MDM  Number of Diagnoses or Management Options  Other ascites:   Diagnosis management comments: 3:40 PM the patient's labs came back and his INR and platelet count are within a safe range.  I spoke with Dr. Reeves the radiologist who will  be performing a paracentesis on the patient this afternoon.       Amount and/or Complexity of Data Reviewed  Clinical lab tests: ordered and reviewed  Tests in the radiology section of CPT®: ordered and reviewed  Tests in the medicine section of CPT®: ordered and reviewed  Decide to obtain previous medical records or to obtain history from someone other than the patient: yes  Review and summarize past medical records: yes  Independent visualization of images, tracings, or specimens: yes    Risk of Complications, Morbidity, and/or Mortality  Presenting problems: high  Diagnostic procedures: high  Management options: high          Final diagnoses:   Other ascites            Jorden Rodríguez MD  10/06/18 1547       Jorden Rodríguez MD  10/06/18 3105

## 2018-10-10 NOTE — PROGRESS NOTES
10/10/2018, approximately 1000 - Subha with [facility] telephoned this staff member with inquiry if patient is to complete Paracentesis as scheduled on Friday, October 12, 2018 as patient previous Paracentesis completed Saturday, October 6, 2018.    10/10/2018, 1010 - Subha with [facility] telephone call returned per this staff member [phone] with notification of verbal order received per Dr. Carl Gill M.D. - Patient to proceed with completion of  Paracentesis as scheduled Friday, October 12, 2018 if patient is in need of Paracentesis due to shortness of breath.  Subha verbalized understanding.

## 2018-10-26 ENCOUNTER — RESULTS ENCOUNTER (OUTPATIENT)
Dept: GASTROENTEROLOGY | Facility: CLINIC | Age: 83
End: 2018-10-26

## 2018-10-26 DIAGNOSIS — R18.8 OTHER ASCITES: ICD-10-CM

## 2018-11-09 ENCOUNTER — RESULTS ENCOUNTER (OUTPATIENT)
Dept: GASTROENTEROLOGY | Facility: CLINIC | Age: 83
End: 2018-11-09

## 2018-11-09 DIAGNOSIS — R18.8 OTHER ASCITES: ICD-10-CM

## 2018-11-23 ENCOUNTER — RESULTS ENCOUNTER (OUTPATIENT)
Dept: GASTROENTEROLOGY | Facility: CLINIC | Age: 83
End: 2018-11-23

## 2018-11-23 DIAGNOSIS — R18.8 OTHER ASCITES: ICD-10-CM
